# Patient Record
Sex: FEMALE | Race: WHITE | NOT HISPANIC OR LATINO | Employment: OTHER | URBAN - METROPOLITAN AREA
[De-identification: names, ages, dates, MRNs, and addresses within clinical notes are randomized per-mention and may not be internally consistent; named-entity substitution may affect disease eponyms.]

---

## 2017-01-16 ENCOUNTER — OFFICE VISIT (OUTPATIENT)
Dept: FAMILY MEDICINE CLINIC | Facility: CLINIC | Age: 62
End: 2017-01-16

## 2017-01-16 VITALS
OXYGEN SATURATION: 97 % | SYSTOLIC BLOOD PRESSURE: 124 MMHG | BODY MASS INDEX: 31.41 KG/M2 | DIASTOLIC BLOOD PRESSURE: 80 MMHG | HEIGHT: 64 IN | WEIGHT: 184 LBS | HEART RATE: 70 BPM | TEMPERATURE: 97.9 F

## 2017-01-16 DIAGNOSIS — J02.9 SORE THROAT: Primary | ICD-10-CM

## 2017-01-16 LAB
EXPIRATION DATE: NORMAL
INTERNAL CONTROL: NORMAL
Lab: NORMAL
S PYO AG THROAT QL: NEGATIVE

## 2017-01-16 PROCEDURE — 87880 STREP A ASSAY W/OPTIC: CPT | Performed by: FAMILY MEDICINE

## 2017-01-16 PROCEDURE — 99213 OFFICE O/P EST LOW 20 MIN: CPT | Performed by: FAMILY MEDICINE

## 2017-01-16 RX ORDER — CEFDINIR 300 MG/1
300 CAPSULE ORAL 2 TIMES DAILY
Qty: 20 CAPSULE | Refills: 0 | Status: SHIPPED | OUTPATIENT
Start: 2017-01-16 | End: 2017-03-30

## 2017-01-16 NOTE — PROGRESS NOTES
"Subjective   Ana Alaniz is a 62 y.o. female.     Sore Throat    This is a new problem. The current episode started in the past 7 days. The problem has been unchanged. There has been no fever. The pain is mild. Associated symptoms include congestion, coughing, ear pain and swollen glands. Pertinent negatives include no drooling, headaches or shortness of breath. She has had no exposure to strep. She has tried acetaminophen for the symptoms. The treatment provided mild relief.        The following portions of the patient's history were reviewed and updated as appropriate: allergies, current medications, past social history and problem list.    Review of Systems   Constitutional: Negative for chills, fatigue and fever.   HENT: Positive for congestion, ear pain, postnasal drip, sinus pressure and sore throat. Negative for drooling and rhinorrhea.    Eyes: Positive for pain.   Respiratory: Positive for cough. Negative for shortness of breath.    Musculoskeletal: Negative for myalgias.   Skin: Negative.  Negative for rash.   Neurological: Negative for dizziness and headaches.   Hematological: Negative for adenopathy.       Objective   Visit Vitals   • /80   • Pulse 70   • Temp 97.9 °F (36.6 °C)   • Ht 64\" (162.6 cm)   • Wt 184 lb (83.5 kg)   • SpO2 97%   • BMI 31.58 kg/m2     Physical Exam   Constitutional: She is oriented to person, place, and time. She appears well-developed and well-nourished. She is cooperative.   HENT:   Head: Normocephalic.   Right Ear: External ear normal.   Left Ear: External ear normal.   Nose: Nose normal.   Mouth/Throat: Oropharynx is clear and moist.   Throat is red no exudate rapid strep negative   Eyes: Conjunctivae are normal. Pupils are equal, round, and reactive to light. No scleral icterus.   Neck: Neck supple. Carotid bruit is not present. No thyromegaly present.   Cardiovascular: Normal rate and regular rhythm.    Pulmonary/Chest: Effort normal and breath sounds normal. "   Abdominal: There is no hepatosplenomegaly.   Musculoskeletal: Normal range of motion.   Neurological: She is alert and oriented to person, place, and time.   No focal deficits no lateralizing signs   Skin: Skin is warm and dry. No rash noted.   Psychiatric: She has a normal mood and affect. Cognition and memory are normal.   Nursing note and vitals reviewed.      Assessment/Plan   Problem List Items Addressed This Visit     None      Visit Diagnoses     Sore throat    -  Primary    Relevant Orders    POC Rapid Strep A (Completed)          New Medications Ordered This Visit   Medications   • cefdinir (OMNICEF) 300 MG capsule     Sig: Take 1 capsule by mouth 2 (Two) Times a Day.     Dispense:  20 capsule     Refill:  0       Return to clinic if symptoms persist or worsen. Alternate Tylenol and ibuprofen as needed.

## 2017-01-16 NOTE — MR AVS SNAPSHOT
Ana Alaniz   1/16/2017 3:00 PM   Office Visit    Provider:  Donavon Jones MD   Department:  [facility]   Dept Phone:  [phone]                Your Full Care Plan              Today's Medication Changes          These changes are accurate as of: 1/16/17  3:18 PM.  If you have any questions, ask your nurse or doctor.               New Medication(s)Ordered:     cefdinir 300 MG capsule   Commonly known as:  OMNICEF   Take 1 capsule by mouth 2 (Two) Times a Day.   Started by:  Donavon Jones MD            Where to Get Your Medications      These medications were sent to [address] AT [facility] [phone] [facility] [phone] [place] [id number]     Phone:  [phone]     cefdinir 300 MG capsule                  Your Updated Medication List          This list is accurate as of: 1/16/17  3:18 PM.  Always use your most recent med list.                * amLODIPine 5 MG tablet   Commonly known as:  NORVASC       * amLODIPine 5 MG tablet   Commonly known as:  NORVASC       cefdinir 300 MG capsule   Commonly known as:  OMNICEF   Take 1 capsule by mouth 2 (Two) Times a Day.       fexofenadine 180 MG tablet   Commonly known as:  ALLEGRA       Flax Seed Oil 1300 MG capsule       hydrochlorothiazide 25 MG tablet   Commonly known as:  HYDRODIURIL       losartan 50 MG tablet   Commonly known as:  COZAAR       Multi Vitamin tablet       omeprazole 40 MG capsule   Commonly known as:  priLOSEC       * Notice:  This list has 2 medication(s) that are the same as other medications prescribed for you. Read the directions carefully, and ask your doctor or other care provider to review them with you.            We Performed the Following     POC Rapid Strep A       You Were Diagnosed With        Codes Comments    Sore throat    -  Primary ICD-10-CM: J02.9  ICD-9-CM: 462       Instructions     None    "   Patient Instructions History      MDCapsulehart Signup     [facility] [employer] allows you to send messages to your doctor, view your test results, renew your prescriptions, schedule appointments, and more. To sign up, go to [employer] and click on the Sign Up Now link in the New User? box. Enter your [employer] Activation Code exactly as it appears below along with the last four digits of your Social Security Number and your Date of Birth () to complete the sign-up process. If you do not sign up before the expiration date, you must request a new code.    S[employer] Activation Code: [email or web address]  Expires: 2017  3:18 PM    If you have questions, you can email [email or web address] or call [phone] to talk to our [employer] staff. Remember, [employer] of [employer] is NOT to be used for urgent needs. For medical emergencies, dial 911.               Other Info from Your Visit           Allergies     Ace Inhibitors        Reason for Visit     URI     Sore Throat           Vital Signs     Blood Pressure Pulse Temperature Height Weight Oxygen Saturation    124/80 70 97.9 °F (36.6 °C) 64\" (162.6 cm) 184 lb (83.5 kg) 97%    Body Mass Index Smoking Status                31.58 kg/m2 Never Smoker          Problems and Diagnoses Noted     Sore throat    -  Primary      Results     POC Rapid Strep A      Component Value Standard Range & Units    Rapid Strep A Screen Negative Negative, VALID, INVALID, Not Performed    Internal Control Passed Passed    Lot Number [id number]     Expiration Date 3/2018                   "

## 2017-03-30 ENCOUNTER — OFFICE VISIT (OUTPATIENT)
Dept: FAMILY MEDICINE CLINIC | Facility: CLINIC | Age: 62
End: 2017-03-30

## 2017-03-30 VITALS
DIASTOLIC BLOOD PRESSURE: 72 MMHG | HEART RATE: 72 BPM | SYSTOLIC BLOOD PRESSURE: 120 MMHG | WEIGHT: 181 LBS | HEIGHT: 64 IN | BODY MASS INDEX: 30.9 KG/M2 | OXYGEN SATURATION: 95 % | TEMPERATURE: 98.4 F

## 2017-03-30 DIAGNOSIS — I10 ESSENTIAL HYPERTENSION: ICD-10-CM

## 2017-03-30 DIAGNOSIS — K21.9 GASTROESOPHAGEAL REFLUX DISEASE WITHOUT ESOPHAGITIS: ICD-10-CM

## 2017-03-30 DIAGNOSIS — R07.2 PRECORDIAL PAIN: Primary | ICD-10-CM

## 2017-03-30 DIAGNOSIS — M79.601 BILATERAL ARM PAIN: ICD-10-CM

## 2017-03-30 DIAGNOSIS — M79.602 BILATERAL ARM PAIN: ICD-10-CM

## 2017-03-30 LAB
ALBUMIN SERPL-MCNC: 4.9 G/DL (ref 3.2–4.8)
ALBUMIN/GLOB SERPL: 1.7 G/DL (ref 1.5–2.5)
ALP SERPL-CCNC: 76 U/L (ref 25–100)
ALT SERPL W P-5'-P-CCNC: 25 U/L (ref 7–40)
ANION GAP SERPL CALCULATED.3IONS-SCNC: 9 MMOL/L (ref 3–11)
ARTICHOKE IGE QN: 131 MG/DL (ref 0–130)
AST SERPL-CCNC: 27 U/L (ref 0–33)
BASOPHILS # BLD AUTO: 0.05 10*3/MM3 (ref 0–0.2)
BASOPHILS NFR BLD AUTO: 0.7 % (ref 0–1)
BILIRUB SERPL-MCNC: 0.9 MG/DL (ref 0.3–1.2)
BUN BLD-MCNC: 14 MG/DL (ref 9–23)
BUN/CREAT SERPL: 20 (ref 7–25)
CALCIUM SPEC-SCNC: 10.3 MG/DL (ref 8.7–10.4)
CHLORIDE SERPL-SCNC: 100 MMOL/L (ref 99–109)
CHOLEST SERPL-MCNC: 238 MG/DL (ref 0–200)
CK MB SERPL-CCNC: 0.21 NG/ML (ref 0–5)
CK SERPL-CCNC: 64 U/L (ref 26–174)
CO2 SERPL-SCNC: 32 MMOL/L (ref 20–31)
CREAT BLD-MCNC: 0.7 MG/DL (ref 0.6–1.3)
CRP SERPL-MCNC: 2.2 MG/DL (ref 0–10)
DEPRECATED RDW RBC AUTO: 42.6 FL (ref 37–54)
EOSINOPHIL # BLD AUTO: 0.16 10*3/MM3 (ref 0.1–0.3)
EOSINOPHIL NFR BLD AUTO: 2.3 % (ref 0–3)
ERYTHROCYTE [DISTWIDTH] IN BLOOD BY AUTOMATED COUNT: 12.6 % (ref 11.3–14.5)
GFR SERPL CREATININE-BSD FRML MDRD: 84 ML/MIN/1.73
GLOBULIN UR ELPH-MCNC: 2.9 GM/DL
GLUCOSE BLD-MCNC: 104 MG/DL (ref 70–100)
HCT VFR BLD AUTO: 40.9 % (ref 34.5–44)
HDLC SERPL-MCNC: 99 MG/DL (ref 40–60)
HGB BLD-MCNC: 13.6 G/DL (ref 11.5–15.5)
IMM GRANULOCYTES # BLD: 0.02 10*3/MM3 (ref 0–0.03)
IMM GRANULOCYTES NFR BLD: 0.3 % (ref 0–0.6)
LYMPHOCYTES # BLD AUTO: 2.2 10*3/MM3 (ref 0.6–4.8)
LYMPHOCYTES NFR BLD AUTO: 31.4 % (ref 24–44)
MCH RBC QN AUTO: 30.7 PG (ref 27–31)
MCHC RBC AUTO-ENTMCNC: 33.3 G/DL (ref 32–36)
MCV RBC AUTO: 92.3 FL (ref 80–99)
MONOCYTES # BLD AUTO: 0.53 10*3/MM3 (ref 0–1)
MONOCYTES NFR BLD AUTO: 7.6 % (ref 0–12)
NEUTROPHILS # BLD AUTO: 4.04 10*3/MM3 (ref 1.5–8.3)
NEUTROPHILS NFR BLD AUTO: 57.7 % (ref 41–71)
PLATELET # BLD AUTO: 234 10*3/MM3 (ref 150–450)
PMV BLD AUTO: 11.6 FL (ref 6–12)
POTASSIUM BLD-SCNC: 3.4 MMOL/L (ref 3.5–5.5)
PROT SERPL-MCNC: 7.8 G/DL (ref 5.7–8.2)
RBC # BLD AUTO: 4.43 10*6/MM3 (ref 3.89–5.14)
SODIUM BLD-SCNC: 141 MMOL/L (ref 132–146)
TRIGL SERPL-MCNC: 67 MG/DL (ref 0–150)
WBC NRBC COR # BLD: 7 10*3/MM3 (ref 3.5–10.8)

## 2017-03-30 PROCEDURE — 86140 C-REACTIVE PROTEIN: CPT | Performed by: FAMILY MEDICINE

## 2017-03-30 PROCEDURE — 82550 ASSAY OF CK (CPK): CPT | Performed by: FAMILY MEDICINE

## 2017-03-30 PROCEDURE — 85025 COMPLETE CBC W/AUTO DIFF WBC: CPT | Performed by: FAMILY MEDICINE

## 2017-03-30 PROCEDURE — 80061 LIPID PANEL: CPT | Performed by: FAMILY MEDICINE

## 2017-03-30 PROCEDURE — 80053 COMPREHEN METABOLIC PANEL: CPT | Performed by: FAMILY MEDICINE

## 2017-03-30 PROCEDURE — 82553 CREATINE MB FRACTION: CPT | Performed by: FAMILY MEDICINE

## 2017-03-30 PROCEDURE — 99214 OFFICE O/P EST MOD 30 MIN: CPT | Performed by: FAMILY MEDICINE

## 2017-03-30 PROCEDURE — 36415 COLL VENOUS BLD VENIPUNCTURE: CPT | Performed by: FAMILY MEDICINE

## 2017-03-30 PROCEDURE — 93000 ELECTROCARDIOGRAM COMPLETE: CPT | Performed by: FAMILY MEDICINE

## 2017-03-30 RX ORDER — LOSARTAN POTASSIUM 50 MG/1
50 TABLET ORAL DAILY
Qty: 90 TABLET | Refills: 3 | Status: SHIPPED | OUTPATIENT
Start: 2017-03-30 | End: 2018-04-16 | Stop reason: SDUPTHER

## 2017-03-30 RX ORDER — HYDROCHLOROTHIAZIDE 25 MG/1
25 TABLET ORAL DAILY
Qty: 90 TABLET | Refills: 3 | Status: SHIPPED | OUTPATIENT
Start: 2017-03-30 | End: 2018-04-16 | Stop reason: SDUPTHER

## 2017-03-30 RX ORDER — AMLODIPINE BESYLATE 5 MG/1
5 TABLET ORAL DAILY
Qty: 90 TABLET | Refills: 3 | Status: SHIPPED | OUTPATIENT
Start: 2017-03-30 | End: 2018-04-16 | Stop reason: SDUPTHER

## 2017-03-30 RX ORDER — NITROGLYCERIN 0.4 MG/1
0.4 TABLET SUBLINGUAL
Qty: 20 TABLET | Refills: 0 | Status: SHIPPED | OUTPATIENT
Start: 2017-03-30 | End: 2021-03-17

## 2017-03-30 NOTE — PROGRESS NOTES
"Subjective   Ana Alaniz is a 62 y.o. female.     Arm Pain    The incident occurred more than 1 week ago (everyday x 2 weeks). There was no injury mechanism. The pain is present in the upper left arm and upper right arm. The quality of the pain is described as aching (constant). The pain radiates to the right neck and left neck. The pain is moderate. The pain has been constant since the incident. Associated symptoms include chest pain (mild intermittent). Nothing aggravates the symptoms.        The following portions of the patient's history were reviewed and updated as appropriate: allergies, current medications, past social history and problem list.    Review of Systems   Constitutional: Negative for diaphoresis.   Respiratory: Negative for shortness of breath.    Cardiovascular: Positive for chest pain (mild intermittent).   Gastrointestinal: Negative for nausea.       Objective   /72  Pulse 72  Temp 98.4 °F (36.9 °C)  Ht 64\" (162.6 cm)  Wt 181 lb (82.1 kg)  SpO2 95%  BMI 31.07 kg/m2  Physical Exam   Constitutional: She appears well-developed and well-nourished.   Cardiovascular: Normal rate, regular rhythm, normal heart sounds and intact distal pulses.    Pulmonary/Chest: Effort normal and breath sounds normal.   Musculoskeletal: Normal range of motion. She exhibits tenderness (mild left upper arm).   Nursing note and vitals reviewed.      ECG 12 Lead  Date/Time: 3/31/2017 8:54 AM  Performed by: MARTHA SHERMAN  Authorized by: MARTHA SHERMAN   Comparison: compared with previous ECG   Comparison to previous ECG: Tracing from July 2015 revealed upright T waves in leads V3 through V6. The patient now has inverted T waves in those leads. The right bundle branch block was present at that time as well.  Rhythm: sinus rhythm  Rate: normal  Conduction: right bundle branch block  ST Segments: ST segments normal  T depression: V2, V3, V4, V5, V6 and III  T flattening: aVF  QRS axis: normal  Clinical " impression: abnormal ECG          Assessment/Plan   Problem List Items Addressed This Visit        Cardiovascular and Mediastinum    Hypertension       Digestive    Gastroesophageal reflux disease      Other Visit Diagnoses     Precordial pain    -  Primary    Bilateral arm pain                  Rest and drink plenty fluids.    Rx for Nitrostat as needed.    Start Aspirin 325 mg daily.    Check CBC,CMP,Lipids, CK with CKMB and CRP. Report results by letter.    Refer to Cardiology ASAP.    Follow up as needed.    Scribed for Dr Tom Bernal by Neryeda Schwartz CMA.    I, Tom Bernal MD, personally performed the services described in this documentation, as scribed by Nereyda Schwartz in my presence, and is both accurate and complete.

## 2017-03-31 ENCOUNTER — OFFICE VISIT (OUTPATIENT)
Dept: FAMILY MEDICINE CLINIC | Facility: CLINIC | Age: 62
End: 2017-03-31

## 2017-03-31 VITALS
DIASTOLIC BLOOD PRESSURE: 70 MMHG | HEIGHT: 64 IN | OXYGEN SATURATION: 98 % | SYSTOLIC BLOOD PRESSURE: 122 MMHG | WEIGHT: 182 LBS | TEMPERATURE: 98.3 F | HEART RATE: 75 BPM | BODY MASS INDEX: 31.07 KG/M2

## 2017-03-31 DIAGNOSIS — R07.2 PRECORDIAL PAIN: Primary | ICD-10-CM

## 2017-03-31 DIAGNOSIS — M79.601 BILATERAL ARM PAIN: ICD-10-CM

## 2017-03-31 DIAGNOSIS — I10 ESSENTIAL HYPERTENSION: ICD-10-CM

## 2017-03-31 DIAGNOSIS — M79.602 BILATERAL ARM PAIN: ICD-10-CM

## 2017-03-31 PROCEDURE — 99213 OFFICE O/P EST LOW 20 MIN: CPT | Performed by: FAMILY MEDICINE

## 2017-03-31 RX ORDER — ASPIRIN 325 MG
325 TABLET ORAL DAILY
COMMUNITY
End: 2018-04-16

## 2017-03-31 RX ORDER — OLOPATADINE HYDROCHLORIDE 1 MG/ML
1 SOLUTION/ DROPS OPHTHALMIC 2 TIMES DAILY
Qty: 5 ML | Refills: 1 | Status: SHIPPED | OUTPATIENT
Start: 2017-03-31 | End: 2018-04-16

## 2017-03-31 RX ORDER — POTASSIUM CHLORIDE 750 MG/1
10 CAPSULE, EXTENDED RELEASE ORAL 2 TIMES DAILY
Qty: 30 CAPSULE | Refills: 0 | Status: SHIPPED | OUTPATIENT
Start: 2017-03-31 | End: 2017-12-05

## 2017-04-03 ENCOUNTER — HOSPITAL ENCOUNTER (OUTPATIENT)
Dept: GENERAL RADIOLOGY | Facility: HOSPITAL | Age: 62
Discharge: HOME | End: 2017-04-03
Attending: FAMILY MEDICINE | Admitting: FAMILY MEDICINE

## 2017-04-03 DIAGNOSIS — M79.601 BILATERAL ARM PAIN: ICD-10-CM

## 2017-04-03 DIAGNOSIS — M79.602 BILATERAL ARM PAIN: ICD-10-CM

## 2017-04-03 DIAGNOSIS — R07.2 PRECORDIAL PAIN: ICD-10-CM

## 2017-04-03 PROCEDURE — 71020 HC CHEST PA AND LATERAL: CPT

## 2017-04-14 ENCOUNTER — CONSULT (OUTPATIENT)
Dept: CARDIOLOGY | Facility: CLINIC | Age: 62
End: 2017-04-14

## 2017-04-14 VITALS
SYSTOLIC BLOOD PRESSURE: 124 MMHG | BODY MASS INDEX: 30.73 KG/M2 | HEIGHT: 64 IN | HEART RATE: 88 BPM | DIASTOLIC BLOOD PRESSURE: 88 MMHG | WEIGHT: 180 LBS

## 2017-04-14 DIAGNOSIS — I10 ESSENTIAL HYPERTENSION: ICD-10-CM

## 2017-04-14 DIAGNOSIS — R07.2 PRECORDIAL PAIN: Primary | ICD-10-CM

## 2017-04-14 DIAGNOSIS — E78.2 MIXED HYPERLIPIDEMIA: ICD-10-CM

## 2017-04-14 PROCEDURE — 99244 OFF/OP CNSLTJ NEW/EST MOD 40: CPT | Performed by: INTERNAL MEDICINE

## 2017-04-14 NOTE — PROGRESS NOTES
[facility]  Consultation H&P  Ana Alaniz  1955  [phone]  VISIT DATE:  04/14/2017    PCP: Tom Bernal MD  [id number] [place] [address] [place] [id number]    IDENTIFICATION: A 62 y.o. female from [place], her  is a patient of Dr. Killian's as well.    CC:  Chief Complaint   Patient presents with   • Consult     CP       PROBLEM LIST:  1. HTN  2. HLD  1. Lipids 3/30/17:   238/67/99/131  2. ASCVD 10 year risk = 4.8%  3. LUKASZ- on CPAP since 2013  4. GERD  5. OA    Allergies  Allergies   Allergen Reactions   • Ace Inhibitors        Current Medications    Current Outpatient Prescriptions:   •  amLODIPine (NORVASC) 5 MG tablet, Take 1 tablet by mouth Daily., Disp: 90 tablet, Rfl: 3  •  aspirin 325 MG tablet, Take 325 mg by mouth Daily., Disp: , Rfl:   •  fexofenadine (ALLEGRA) 180 MG tablet, Take  by mouth., Disp: , Rfl:   •  Flaxseed, Linseed, (FLAX SEED OIL) 1300 MG capsule, Take  by mouth., Disp: , Rfl:   •  hydrochlorothiazide (HYDRODIURIL) 25 MG tablet, Take 1 tablet by mouth Daily., Disp: 90 tablet, Rfl: 3  •  losartan (COZAAR) 50 MG tablet, Take 1 tablet by mouth Daily., Disp: 90 tablet, Rfl: 3  •  Multiple Vitamin (MULTI VITAMIN) tablet, Take  by mouth., Disp: , Rfl:   •  nitroglycerin (NITROSTAT) 0.4 MG SL tablet, Place 1 tablet under the tongue Every 5 (Five) Minutes As Needed for Chest Pain. Take no more than 3 doses in 15 minutes., Disp: 20 tablet, Rfl: 0  •  olopatadine (PATANOL) 0.1 % ophthalmic solution, Administer 1 drop to both eyes 2 (Two) Times a Day., Disp: 5 mL, Rfl: 1  •  omeprazole (priLOSEC) 40 MG capsule, Take  by mouth., Disp: , Rfl:   •  potassium chloride (MICRO-K) 10 MEQ CR capsule, Take 1 capsule by mouth 2 (Two) Times a Day., Disp: 30 capsule, Rfl: 0     History of Present Illness   HPI  This is a 62 year old  female with a PMH significant for hypertension, hyperlipidemia, obstructive sleep apnea is presenting  for consult by her PCP, Dr. Bernal, for chest pain.  She seen her PCPs office on .  Her pain started in her upper arms bilaterally about a month ago, and the pain would sometimes move to her central chest. She describes the pain as 6-7/10, a tingling/burning pain. She reports associated tingling in her fingers.  She first noticed this pain when walking for exercise, but the pain happens at rest as well.  She denies dyspnea, dyspnea on exertion, orthopnea, PND, palpitations, lower extremity edema. PT denies history of CHF, DVT, PE, CVA, TIA, MI, and rheumatic fever.  Pt remembers having a stress test 10+ years ago for having tachycardia.  Pt reports cutting down on caffeine and has 1-2 cups of coffee in the mornings.  Pt denies family history of early cardiac death, MI, or aneurysm. Mother had multiple MIs, ended up dying in her 60s not directly from MI, and father  young of a blood clot, unsure on more specifics.     ROS  Review of Systems   Cardiovascular: Positive for chest pain.   All other systems reviewed and are negative.      SOCIAL HX  Social History     Social History   • Marital status:      Spouse name: N/A   • Number of children: N/A   • Years of education: N/A     Occupational History   • Not on file.     Social History Main Topics   • Smoking status: Never Smoker   • Smokeless tobacco: Never Used   • Alcohol use 1.2 - 2.4 oz/week     1 - 2 Glasses of wine, 1 - 2 Cans of beer per week      Comment: nightly   • Drug use: No   • Sexual activity: Defer     Other Topics Concern   • Not on file     Social History Narrative       FAMILY HX  Family History   Problem Relation Age of Onset   • Heart disease Mother    • Hypertension Mother    • Kidney disease Mother    • Hypertension Father    • Stroke Father    • Coronary artery disease Father        Vitals:    17 1046 17 1048 17 1049   BP: 118/78 116/82 124/88   BP Location: Right arm Right arm Left arm   Patient Position:  "Sitting Sitting Sitting   Pulse: 89 97 88   Weight: 180 lb (81.6 kg)     Height: 64\" (162.6 cm)         PHYSICAL EXAMINATION:  Physical Exam   Constitutional: She is oriented to person, place, and time. She appears well-developed and well-nourished. No distress.   HENT:   Head: Normocephalic and atraumatic.   Right Ear: External ear normal.   Left Ear: External ear normal.   Nose: Nose normal.   Eyes: Conjunctivae and EOM are normal.   Neck: Neck supple. No hepatojugular reflux and no JVD present. Carotid bruit is not present. No thyromegaly present.   Cardiovascular: Normal rate, regular rhythm, S1 normal, S2 normal, normal heart sounds, intact distal pulses and normal pulses.  Exam reveals no gallop, no distant heart sounds and no midsystolic click.    No murmur heard.  Pulses:       Radial pulses are 2+ on the right side, and 2+ on the left side.        Dorsalis pedis pulses are 2+ on the right side, and 2+ on the left side.        Posterior tibial pulses are 2+ on the right side, and 2+ on the left side.   Pulmonary/Chest: Effort normal and breath sounds normal. No respiratory distress. She has no decreased breath sounds. She has no wheezes. She has no rhonchi. She has no rales.   Abdominal: Soft. Bowel sounds are normal. There is no hepatosplenomegaly. There is no tenderness.   Musculoskeletal: Normal range of motion. She exhibits no edema.   Neurological: She is alert and oriented to person, place, and time.   No focal deficits.   Skin: Skin is warm and dry. No erythema.   Psychiatric: She has a normal mood and affect. Thought content normal.   Nursing note and vitals reviewed.      Diagnostic Data:  Procedures  Lab Results   Component Value Date    CHLPL 242 (H) 03/17/2016    TRIG 67 03/30/2017    HDL 99 (H) 03/30/2017    LDLDIRECT 131 (H) 03/30/2017     Lab Results   Component Value Date    GLUCOSE 104 (H) 03/30/2017    BUN 14 03/30/2017    CREATININE 0.70 03/30/2017     03/30/2017    K 3.4 (L) " 03/30/2017     03/30/2017    CO2 32.0 (H) 03/30/2017     Lab Results   Component Value Date    HGBA1C 5.5 07/16/2015     Lab Results   Component Value Date    WBC 7.00 03/30/2017    HGB 13.6 03/30/2017    HCT 40.9 03/30/2017     03/30/2017       ASSESSMENT:   Diagnosis Plan   1. Precordial pain  Stress Test With Myocardial Perfusion (1 Day)   2. Essential hypertension     3. Mixed hyperlipidemia            PLAN:  1.  Ischemic evaluation via exercise cardiolite due to anginal equivalent CP with strong family history  2.  Well controlled on amlodipine and HCTZ  3.  Well controlled, HDL 99, on statin therapy    Scribed for Naga Killian MD by NABIL Clement. 4/14/2017  11:23 AM  I, Naga Killian MD, personally performed the services described in this documentation as scribed by the above named individual in my presence, and it is both accurate and complete.  4/14/2017  5:36 PM      Naga Killian MD, FACC

## 2017-04-19 ENCOUNTER — APPOINTMENT (OUTPATIENT)
Dept: CARDIOLOGY | Facility: HOSPITAL | Age: 62
End: 2017-04-19

## 2017-04-19 ENCOUNTER — HOSPITAL ENCOUNTER (OUTPATIENT)
Dept: CARDIOLOGY | Facility: HOSPITAL | Age: 62
Discharge: HOME | End: 2017-04-19

## 2017-04-19 ENCOUNTER — HOSPITAL ENCOUNTER (OUTPATIENT)
Dept: CARDIOLOGY | Facility: HOSPITAL | Age: 62
Discharge: HOME | End: 2017-04-19
Admitting: PHYSICIAN ASSISTANT

## 2017-04-19 VITALS
WEIGHT: 179 LBS | HEART RATE: 66 BPM | BODY MASS INDEX: 30.56 KG/M2 | HEIGHT: 64 IN | SYSTOLIC BLOOD PRESSURE: 140 MMHG | DIASTOLIC BLOOD PRESSURE: 82 MMHG

## 2017-04-19 DIAGNOSIS — R07.2 PRECORDIAL PAIN: ICD-10-CM

## 2017-04-19 PROCEDURE — 0 TECHNETIUM SESTAMIBI: Performed by: PHYSICIAN ASSISTANT

## 2017-04-19 PROCEDURE — 78452 HT MUSCLE IMAGE SPECT MULT: CPT

## 2017-04-19 PROCEDURE — 93017 CV STRESS TEST TRACING ONLY: CPT

## 2017-04-19 PROCEDURE — 93018 CV STRESS TEST I&R ONLY: CPT | Performed by: INTERNAL MEDICINE

## 2017-04-19 PROCEDURE — 78452 HT MUSCLE IMAGE SPECT MULT: CPT | Performed by: INTERNAL MEDICINE

## 2017-04-19 PROCEDURE — A9500 TC99M SESTAMIBI: HCPCS | Performed by: PHYSICIAN ASSISTANT

## 2017-04-19 RX ADMIN — Medication 1 DOSE: at 09:20

## 2017-04-19 RX ADMIN — Medication 1 DOSE: at 07:35

## 2017-05-18 LAB
BH CV STRESS BP STAGE 1: NORMAL
BH CV STRESS BP STAGE 2: NORMAL
BH CV STRESS DURATION MIN STAGE 1: 3
BH CV STRESS DURATION MIN STAGE 2: 3
BH CV STRESS DURATION SEC STAGE 1: 0
BH CV STRESS DURATION SEC STAGE 2: 30
BH CV STRESS GRADE STAGE 1: 10
BH CV STRESS GRADE STAGE 2: 12
BH CV STRESS HR STAGE 1: 117
BH CV STRESS HR STAGE 2: 129
BH CV STRESS METS STAGE 1: 5
BH CV STRESS METS STAGE 2: 7.5
BH CV STRESS O2 STAGE 1: 98
BH CV STRESS O2 STAGE 2: 100
BH CV STRESS PROTOCOL 1: NORMAL
BH CV STRESS RECOVERY BP: NORMAL MMHG
BH CV STRESS RECOVERY HR: 88 BPM
BH CV STRESS SPEED STAGE 1: 1.7
BH CV STRESS SPEED STAGE 2: 2.5
BH CV STRESS STAGE 1: 1
BH CV STRESS STAGE 2: 2
LV EF NUC BP: 89 %
MAXIMAL PREDICTED HEART RATE: 156 BPM
PERCENT MAX PREDICTED HR: 85.26 %
STRESS BASELINE BP: NORMAL MMHG
STRESS BASELINE HR: 70 BPM
STRESS PERCENT HR: 100 %
STRESS POST ESTIMATED WORKLOAD: 7 METS
STRESS POST EXERCISE DUR MIN: 6 MIN
STRESS POST EXERCISE DUR SEC: 30 SEC
STRESS POST O2 SAT PEAK: 100 %
STRESS POST PEAK BP: NORMAL MMHG
STRESS POST PEAK HR: 133 BPM
STRESS TARGET HR: 133 BPM

## 2017-07-03 ENCOUNTER — OFFICE VISIT (OUTPATIENT)
Dept: FAMILY MEDICINE CLINIC | Facility: CLINIC | Age: 62
End: 2017-07-03

## 2017-07-03 VITALS
HEIGHT: 64 IN | WEIGHT: 181 LBS | OXYGEN SATURATION: 95 % | HEART RATE: 87 BPM | TEMPERATURE: 99.2 F | BODY MASS INDEX: 30.9 KG/M2 | SYSTOLIC BLOOD PRESSURE: 130 MMHG | DIASTOLIC BLOOD PRESSURE: 80 MMHG

## 2017-07-03 DIAGNOSIS — M67.472 GANGLION CYST OF LEFT FOOT: Primary | ICD-10-CM

## 2017-07-03 PROCEDURE — 99213 OFFICE O/P EST LOW 20 MIN: CPT | Performed by: FAMILY MEDICINE

## 2017-07-03 NOTE — PROGRESS NOTES
"Subjective   Ana Alaniz is a 62 y.o. female.     History of Present Illness   The patient presents today with c/o knot of the left foot.  States it has gotten bigger over the past month.  Discomfort with walking.    Denies any chest pain or shortness of breath.    States she is still having a little pain in both upper arms.    The following portions of the patient's history were reviewed and updated as appropriate: allergies, current medications, past social history and problem list.    Review of Systems   Respiratory: Negative for shortness of breath.    Cardiovascular: Negative for chest pain.   Musculoskeletal:        Left foot ganglion         Objective   /80  Pulse 87  Temp 99.2 °F (37.3 °C)  Ht 64\" (162.6 cm)  Wt 181 lb (82.1 kg)  SpO2 95%  BMI 31.07 kg/m2  Physical Exam   Musculoskeletal:   Exam of sole of left foot reveals a cystic subcutaneous tumor of the plantar flexor tendon.  Tender to deep palpation.  No paresthesias with percussion.        Nursing note and vitals reviewed.      Assessment/Plan   Problem List Items Addressed This Visit     None      Visit Diagnoses     Ganglion cyst of left foot    -  Primary      refer to dr franki duran of orthopedics.            I, Tom Bernal MD, personally performed the services described in this documentation, as scribed by Nereyda Schwartz in my presence, and is both accurate and complete.         "

## 2017-07-06 ENCOUNTER — TELEPHONE (OUTPATIENT)
Dept: FAMILY MEDICINE CLINIC | Facility: CLINIC | Age: 62
End: 2017-07-06

## 2017-07-06 RX ORDER — AMLODIPINE BESYLATE AND ATORVASTATIN CALCIUM 10; 10 MG/1; MG/1
1 TABLET, FILM COATED ORAL DAILY
Qty: 90 TABLET | Refills: 3 | Status: SHIPPED | OUTPATIENT
Start: 2017-07-06 | End: 2017-07-07 | Stop reason: SDUPTHER

## 2017-07-06 NOTE — TELEPHONE ENCOUNTER
----- Message from Dia Zuniga MA sent at 7/6/2017  2:59 PM EDT -----  Contact: [phone]  PATIENT IS REQUESTING REFILL ON ATORVASTATIN 10 MG SENT TO [facility]. MEDICATION IS NOT IN PATIENT CURRENT MED LIST. PLEASE ADVISE.

## 2017-07-07 ENCOUNTER — TELEPHONE (OUTPATIENT)
Dept: FAMILY MEDICINE CLINIC | Facility: CLINIC | Age: 62
End: 2017-07-07

## 2017-07-07 RX ORDER — AMLODIPINE BESYLATE AND ATORVASTATIN CALCIUM 10; 10 MG/1; MG/1
1 TABLET, FILM COATED ORAL DAILY
Qty: 90 TABLET | Refills: 3 | Status: SHIPPED | OUTPATIENT
Start: 2017-07-07 | End: 2017-07-07

## 2017-07-07 RX ORDER — ATORVASTATIN CALCIUM 10 MG/1
10 TABLET, FILM COATED ORAL DAILY
Qty: 90 TABLET | Refills: 3 | Status: SHIPPED | OUTPATIENT
Start: 2017-07-07 | End: 2018-04-16 | Stop reason: SDUPTHER

## 2017-07-07 NOTE — TELEPHONE ENCOUNTER
----- Message from Fatoumata Frances MA sent at 7/6/2017  4:58 PM EDT -----  Regarding: medication  atorvastin 10 mg needs to be sent to [facility]

## 2017-09-06 ENCOUNTER — OFFICE VISIT (OUTPATIENT)
Dept: ORTHOPEDIC SURGERY | Facility: CLINIC | Age: 62
End: 2017-09-06

## 2017-09-06 VITALS
HEART RATE: 85 BPM | SYSTOLIC BLOOD PRESSURE: 128 MMHG | RESPIRATION RATE: 16 BRPM | WEIGHT: 178 LBS | DIASTOLIC BLOOD PRESSURE: 83 MMHG | BODY MASS INDEX: 30.39 KG/M2 | HEIGHT: 64 IN

## 2017-09-06 DIAGNOSIS — M72.2 PLANTAR FASCIAL FIBROMATOSIS OF LEFT FOOT: Primary | ICD-10-CM

## 2017-09-06 PROCEDURE — 99202 OFFICE O/P NEW SF 15 MIN: CPT | Performed by: ORTHOPAEDIC SURGERY

## 2017-09-06 NOTE — PROGRESS NOTES
NEW PATIENT    Patient: Ana Alaniz  : 1955    Primary Care Provider: Tom Bernal MD    Requesting Provider: As above    Ganglion Cyst of the Left Foot      History    Chief Complaint: Nodule left foot    History of Present Illness: This is an extremely pleasant 62-year-old woman here today with her .  She has noted a nodule in the middle of the left foot on the plantar surface.  She has been wondering if it was a ganglion cyst.  She just retired this week.  She reports the nodule doesn't really hurt.  She has just been concerned that might be getting bigger.  She reports that her ancestors are from [place].  She does not know anyone who has Dupuytren's, nor anyone who has a similar nodule in the foot.    Current Outpatient Prescriptions on File Prior to Visit   Medication Sig Dispense Refill   • amLODIPine (NORVASC) 5 MG tablet Take 1 tablet by mouth Daily. 90 tablet 3   • aspirin 325 MG tablet Take 325 mg by mouth Daily.     • atorvastatin (LIPITOR) 10 MG tablet Take 1 tablet by mouth Daily. 90 tablet 3   • cephalexin (KEFLEX) 500 MG capsule Take 2,000 mg by mouth Take As Directed. One hour prior to dental work     • fexofenadine (ALLEGRA) 180 MG tablet Take  by mouth.     • Flaxseed, Linseed, (FLAX SEED OIL) 1300 MG capsule Take  by mouth.     • hydrochlorothiazide (HYDRODIURIL) 25 MG tablet Take 1 tablet by mouth Daily. 90 tablet 3   • losartan (COZAAR) 50 MG tablet Take 1 tablet by mouth Daily. 90 tablet 3   • Multiple Vitamin (MULTI VITAMIN) tablet Take  by mouth.     • nitroglycerin (NITROSTAT) 0.4 MG SL tablet Place 1 tablet under the tongue Every 5 (Five) Minutes As Needed for Chest Pain. Take no more than 3 doses in 15 minutes. 20 tablet 0   • olopatadine (PATANOL) 0.1 % ophthalmic solution Administer 1 drop to both eyes 2 (Two) Times a Day. 5 mL 1   • omeprazole (priLOSEC) 40 MG capsule Take  by mouth.     • potassium chloride (MICRO-K) 10 MEQ CR capsule Take 1 capsule by mouth 2  (Two) Times a Day. 30 capsule 0   • RED YEAST RICE EXTRACT PO Take  by mouth Daily. Take as directed       No current facility-administered medications on file prior to visit.       Allergies   Allergen Reactions   • Ace Inhibitors    • Tetanus Toxoids       Past Medical History:   Diagnosis Date   • Acute pharyngitis    • Allergy    • Arrhythmia    • Degenerative arthritis    • Degenerative joint disease 11/8/2016   • Epistaxis    • Glaucoma    • Hypertension    • Positive KENY (antinuclear antibody)    • Right foot pain    • Sinusitis    • Sleep apnea    • Transfusion history    • URI (upper respiratory infection)      Past Surgical History:   Procedure Laterality Date   • APPENDECTOMY     • CATARACT EXTRACTION, BILATERAL     • OTHER SURGICAL HISTORY      LT Knee scar excision   • REPLACEMENT TOTAL KNEE Right    • TOTAL KNEE ARTHROPLASTY Left      Family History   Problem Relation Age of Onset   • Heart disease Mother    • Hypertension Mother    • Kidney disease Mother    • Clotting disorder Mother    • Heart attack Mother    • Hypertension Father    • Stroke Father    • Coronary artery disease Father    • Clotting disorder Father    • Cancer Other       Social History     Social History   • Marital status:      Spouse name: N/A   • Number of children: N/A   • Years of education: N/A     Occupational History   • Not on file.     Social History Main Topics   • Smoking status: Never Smoker   • Smokeless tobacco: Never Used   • Alcohol use 1.2 - 2.4 oz/week     1 - 2 Glasses of wine, 1 - 2 Cans of beer per week      Comment: nightly   • Drug use: No   • Sexual activity: Defer     Other Topics Concern   • Not on file     Social History Narrative        Review of Systems   Constitutional: Negative.    HENT: Negative.    Eyes: Negative.    Respiratory: Negative.    Cardiovascular: Negative.    Gastrointestinal: Negative.    Endocrine: Negative.    Genitourinary: Negative.    Musculoskeletal: Positive for  "arthralgias.        When walking a lot.   Skin: Negative.    Allergic/Immunologic: Negative.    Neurological: Negative.    Hematological: Negative.    Psychiatric/Behavioral: Negative.        The following portions of the patient's history were reviewed and updated as appropriate: allergies, current medications, past family history, past medical history, past social history, past surgical history and problem list.    Physical Exam:   /83  Pulse 85  Resp 16  Ht 64\" (162.6 cm)  Wt 178 lb (80.7 kg)  BMI 30.55 kg/m2  GENERAL: Body habitus: normal weight for height    Lower extremity edema: Left: none; Right: none    Varicose veins:  Left: none; Right: none    Gait: normal     Mental Status:  awake and alert; oriented to person, place, and time    Voice:  clear  SKIN:  Normal and warm and dry    Hair Growth:  Right:normal; Left:  normal  NAILS: Toenails: normal  HEENT: Head: Normocephalic, atraumatic,  without obvious abnormality.  PULM:  Repiratory effort normal  CV:  Dorsalis Pedis:  Right: 2+; Left:2+    Posterior Tibial: Right:2+; Left:2+    Capillary Refill:  Brisk  MSK:  Hand:right handed and No Dupuytren's in either hand, mild Heberden's nodes      Tibia:  Right:  non tender; Left:  non tender      Ankle:  Right: non tender, ROM  normal and motor function  normal; Left:  non tender, ROM  normal and motor function  normal      Foot:  Right:  non tender and No plantar fibromas; Left:  Plantar fibroma in the middle of the medial corner the plantar fascia, nontender, about 8 mm, no other plantar fibromas      NEURO:        Lower extremity sensation: intact           Calf Atrophy:none    Motor Function: all 5/5         Medical Decision Making    Data Review:   none    Assessment and Plan/ Diagnosis/Treatment options:   She has a plantar fibroma.  I explained plantar fibromatosis to the patient in detail.  It is a benign fibrous tumor in the plantar fascia.  It is best treated non-operatively as the " "recurrence rate after surgery is >50%.  It is more common in people of northern  descent and in diabetes.  It can be associated with  Dupuytren's in the palm, and Peyronie's in the male genitals.  It does not cause contracture in the foot.  It can be painful, especially in the developing phase.  However, the pain almost always resolves over time and it becomes a painless bump.      Custom orthotics with a \"dent\" in the area of the nodule help take the pressure off it.       If patients have severe pain, a steroid injection sometimes helps the pain and sometimes shrinks the nodule.    Given that she has very little pain, I would recommend custom orthotics.  I will be happy to see her any time.                  "

## 2017-10-23 ENCOUNTER — OFFICE VISIT (OUTPATIENT)
Dept: FAMILY MEDICINE CLINIC | Facility: CLINIC | Age: 62
End: 2017-10-23

## 2017-10-23 VITALS
HEART RATE: 82 BPM | HEIGHT: 64 IN | SYSTOLIC BLOOD PRESSURE: 118 MMHG | TEMPERATURE: 99.6 F | DIASTOLIC BLOOD PRESSURE: 66 MMHG | WEIGHT: 176 LBS | BODY MASS INDEX: 30.05 KG/M2 | OXYGEN SATURATION: 94 %

## 2017-10-23 DIAGNOSIS — J02.0 STREP PHARYNGITIS: Primary | ICD-10-CM

## 2017-10-23 LAB
EXPIRATION DATE: ABNORMAL
INTERNAL CONTROL: ABNORMAL
Lab: ABNORMAL
S PYO AG THROAT QL: POSITIVE

## 2017-10-23 PROCEDURE — 99213 OFFICE O/P EST LOW 20 MIN: CPT | Performed by: FAMILY MEDICINE

## 2017-10-23 PROCEDURE — 87880 STREP A ASSAY W/OPTIC: CPT | Performed by: FAMILY MEDICINE

## 2017-10-23 RX ORDER — AMOXICILLIN 500 MG/1
500 CAPSULE ORAL 3 TIMES DAILY
Qty: 30 CAPSULE | Refills: 0 | Status: SHIPPED | OUTPATIENT
Start: 2017-10-23 | End: 2017-11-19

## 2017-10-23 NOTE — PROGRESS NOTES
"Subjective   Ana Alaniz is a 62 y.o. female.     Sore Throat    This is a new problem. The current episode started in the past 7 days. The problem has been unchanged. The maximum temperature recorded prior to her arrival was 101 - 101.9 F. The pain is moderate. Associated symptoms include a hoarse voice, swollen glands and trouble swallowing. Pertinent negatives include no shortness of breath. She has tried nothing for the symptoms.        The following portions of the patient's history were reviewed and updated as appropriate: allergies, current medications, past social history and problem list.    Review of Systems   Constitutional: Positive for chills, fatigue and fever (low grade).   HENT: Positive for hoarse voice, sore throat and trouble swallowing.    Respiratory: Negative for shortness of breath.    Cardiovascular: Negative for chest pain.       Objective   /66  Pulse 82  Temp 99.6 °F (37.6 °C)  Ht 64\" (162.6 cm)  Wt 176 lb (79.8 kg)  SpO2 94%  BMI 30.21 kg/m2  Physical Exam   Constitutional: She appears well-developed and well-nourished.   HENT:   Right Ear: External ear normal.   Left Ear: External ear normal.   Mouth/Throat: Posterior oropharyngeal edema and posterior oropharyngeal erythema present.   Cardiovascular: Normal rate, regular rhythm and normal heart sounds.    Pulmonary/Chest: Effort normal and breath sounds normal.   Nursing note and vitals reviewed.      Assessment/Plan   Problem List Items Addressed This Visit     None      Visit Diagnoses     Strep pharyngitis    -  Primary    Relevant Orders    POCT rapid strep A (Completed)              Drink plenty fluids.    Rx for Amoxicillin 500 mg three times a day #30+0.    Follow up as needed.            Scribed for Dr Tom Bernal by Nereyda Schwartz CMA.          I, Tom Bernal MD, personally performed the services described in this documentation, as scribed by Nereyda Schwartz in my presence, and is both accurate and complete.    "

## 2017-11-19 ENCOUNTER — OFFICE VISIT (OUTPATIENT)
Dept: FAMILY MEDICINE CLINIC | Facility: CLINIC | Age: 62
End: 2017-11-19

## 2017-11-19 VITALS
BODY MASS INDEX: 30.73 KG/M2 | WEIGHT: 180 LBS | SYSTOLIC BLOOD PRESSURE: 119 MMHG | HEIGHT: 64 IN | DIASTOLIC BLOOD PRESSURE: 78 MMHG | RESPIRATION RATE: 16 BRPM | TEMPERATURE: 98.4 F | OXYGEN SATURATION: 98 % | HEART RATE: 83 BPM

## 2017-11-19 DIAGNOSIS — J01.40 ACUTE NON-RECURRENT PANSINUSITIS: Primary | ICD-10-CM

## 2017-11-19 PROCEDURE — 99213 OFFICE O/P EST LOW 20 MIN: CPT | Performed by: FAMILY MEDICINE

## 2017-11-19 RX ORDER — DOXYCYCLINE 100 MG/1
100 CAPSULE ORAL 2 TIMES DAILY
Qty: 20 CAPSULE | Refills: 0 | Status: SHIPPED | OUTPATIENT
Start: 2017-11-19 | End: 2017-11-29

## 2017-11-19 NOTE — PROGRESS NOTES
Subjective   Ana Alaniz is a 62 y.o. female.     Sinusitis   This is a new problem. The current episode started in the past 7 days. The problem is unchanged. There has been no fever. Her pain is at a severity of 7/10. Associated symptoms include congestion, coughing (productive), ear pain (right), headaches, a hoarse voice, sinus pressure and a sore throat. Pertinent negatives include no chills. Treatments tried: Mucinex. The treatment provided mild relief.        The following portions of the patient's history were reviewed and updated as appropriate: allergies, current medications, past family history, past medical history, past social history, past surgical history and problem list.    Review of Systems   Constitutional: Negative for chills and fever.   HENT: Positive for congestion, ear pain (right), hoarse voice, postnasal drip, rhinorrhea, sinus pain, sinus pressure and sore throat.    Respiratory: Positive for cough (productive).    Neurological: Positive for headaches.       Objective   Physical Exam   Constitutional: She is oriented to person, place, and time. She appears well-developed and well-nourished.   HENT:   Head: Normocephalic and atraumatic.   Right Ear: Hearing, external ear and ear canal normal. A middle ear effusion (clear fluid) is present.   Left Ear: Hearing, tympanic membrane, external ear and ear canal normal.   Nose: Mucosal edema and rhinorrhea present. Right sinus exhibits maxillary sinus tenderness and frontal sinus tenderness. Left sinus exhibits maxillary sinus tenderness and frontal sinus tenderness.   Mouth/Throat: Uvula is midline and mucous membranes are normal. Oropharyngeal exudate present. No posterior oropharyngeal edema or posterior oropharyngeal erythema.   Eyes: Conjunctivae are normal.   Neck: Normal range of motion. Neck supple.   Cardiovascular: Normal rate, regular rhythm and normal heart sounds.    Pulmonary/Chest: Effort normal and breath sounds normal. She has no  wheezes.   Musculoskeletal: She exhibits no deformity.   Lymphadenopathy:     She has no cervical adenopathy.   Neurological: She is alert and oriented to person, place, and time. No cranial nerve deficit.   Skin: Skin is warm and dry.   Psychiatric: She has a normal mood and affect. Her behavior is normal.   Nursing note and vitals reviewed.      Assessment/Plan   Ana was seen today for sinusitis, cough and earache.    Diagnoses and all orders for this visit:    Acute non-recurrent pansinusitis  -     doxycycline (MONODOX) 100 MG capsule; Take 1 capsule by mouth 2 (Two) Times a Day for 10 days.  -     PredniSONE 5 MG tablet therapy pack dosepak; Take as directed on package instructions.      Treatment plan above  Follow up if no improvement.   Ok to continue OTC Mucinex along with prescribed treatment.

## 2017-11-19 NOTE — PATIENT INSTRUCTIONS
Sinusitis, Adult  Sinusitis is soreness and inflammation of your sinuses. Sinuses are hollow spaces in the bones around your face. They are located:  · Around your eyes.  · In the middle of your forehead.  · Behind your nose.  · In your cheekbones.  Your sinuses and nasal passages are lined with a stringy fluid (mucus). Mucus normally drains out of your sinuses. When your nasal tissues get inflamed or swollen, the mucus can get trapped or blocked so air cannot flow through your sinuses. This lets bacteria, viruses, and funguses grow, and that leads to infection.  HOME CARE  Medicines  · Take, use, or apply over-the-counter and prescription medicines only as told by your doctor. These may include nasal sprays.  · If you were prescribed an antibiotic medicine, take it as told by your doctor. Do not stop taking the antibiotic even if you start to feel better.  Hydrate and Humidify  · Drink enough water to keep your pee (urine) clear or pale yellow.  · Use a cool mist humidifier to keep the humidity level in your home above 50%.  · Breathe in steam for 10-15 minutes, 3-4 times a day or as told by your doctor. You can do this in the bathroom while a hot shower is running.  · Try not to spend time in cool or dry air.  Rest  · Rest as much as possible.    · Sleep with your head raised (elevated).  · Make sure to get enough sleep each night.  General Instructions  · Put a warm, moist washcloth on your face 3-4 times a day or as told by your doctor. This will help with discomfort.  · Wash your hands often with soap and water. If there is no soap and water, use hand .  · Do not smoke. Avoid being around people who are smoking (secondhand smoke).  · Keep all follow-up visits as told by your doctor. This is important.  GET HELP IF:  · You have a fever.  · Your symptoms get worse.  · Your symptoms do not get better within 10 days.  GET HELP RIGHT AWAY IF:  · You have a very bad headache.  · You cannot stop throwing up  (vomiting).  · You have pain or swelling around your face or eyes.  · You have trouble seeing.  · You feel confused.  · Your neck is stiff.  · You have trouble breathing.     This information is not intended to replace advice given to you by your health care provider. Make sure you discuss any questions you have with your health care provider.     Document Released: 06/05/2009 Document Revised: 04/10/2017 Document Reviewed: 10/12/2016  [employer] Interactive Patient Education ©2017 [employer]

## 2017-12-05 ENCOUNTER — OFFICE VISIT (OUTPATIENT)
Dept: SLEEP MEDICINE | Facility: HOSPITAL | Age: 62
End: 2017-12-05

## 2017-12-05 VITALS
DIASTOLIC BLOOD PRESSURE: 70 MMHG | WEIGHT: 178 LBS | BODY MASS INDEX: 30.39 KG/M2 | HEART RATE: 80 BPM | SYSTOLIC BLOOD PRESSURE: 118 MMHG | HEIGHT: 64 IN | OXYGEN SATURATION: 98 %

## 2017-12-05 DIAGNOSIS — G47.33 SEVERE OBSTRUCTIVE SLEEP APNEA: Primary | ICD-10-CM

## 2017-12-05 PROCEDURE — 99213 OFFICE O/P EST LOW 20 MIN: CPT | Performed by: INTERNAL MEDICINE

## 2017-12-05 NOTE — PROGRESS NOTES
Subjective:     Chief Complaint:   Chief Complaint   Patient presents with   • Follow-up       HPI:    Ana Alaniz is a 62 y.o. female here for follow-up of obstructive sleep apnea.    She remains on auto CPAP therapy for her obstructive sleep apnea.  Her main complaint is a dry mouth.  She is utilizing a facemask.  She has not turned up her humidifier as of yet.    Further details are as follows:    Since last visit sleep problem has: remained the same  Currently using PAP: yes Hours of usage during the night: 6    Amount of sleep per night : 7 hours  Average length of time it takes to fall asleep : 10 minutes  Number of awakenings per night : 3     She feels fatigue (tiredness, exhaustion, lethargy) in the daytime even when not sleepy? Occasionally   She feels sleepy (or struggles to stay awake) in the daytime? No    [place] Scale scored as 1/24.    Type of mask: full face mask    She (since starting PAP or since last visit) has problems with the following:   Pressure from the mask 0 - No Problem  Skin irritation from the mask 0 - No Problem  Mask coming off face 0 - No Problem  Air leaks from the mask 4 - Moderate Problems  Dry mouth or throat 8 - Severe Problems  Nasal congestion 5 - Moderate Problems    I reviewed her PAP download:  Average pressure: 8  Average AHI:  3  Average minutes in large leak per night: 2      Current medications are:   Current Outpatient Prescriptions:   •  amLODIPine (NORVASC) 5 MG tablet, Take 1 tablet by mouth Daily., Disp: 90 tablet, Rfl: 3  •  atorvastatin (LIPITOR) 10 MG tablet, Take 1 tablet by mouth Daily., Disp: 90 tablet, Rfl: 3  •  fexofenadine (ALLEGRA) 180 MG tablet, Take  by mouth., Disp: , Rfl:   •  Flaxseed, Linseed, (FLAX SEED OIL) 1300 MG capsule, Take  by mouth., Disp: , Rfl:   •  hydrochlorothiazide (HYDRODIURIL) 25 MG tablet, Take 1 tablet by mouth Daily., Disp: 90 tablet, Rfl: 3  •  losartan (COZAAR) 50 MG tablet, Take 1 tablet by mouth Daily., Disp: 90  tablet, Rfl: 3  •  Multiple Vitamin (MULTI VITAMIN) tablet, Take  by mouth., Disp: , Rfl:   •  nitroglycerin (NITROSTAT) 0.4 MG SL tablet, Place 1 tablet under the tongue Every 5 (Five) Minutes As Needed for Chest Pain. Take no more than 3 doses in 15 minutes., Disp: 20 tablet, Rfl: 0  •  olopatadine (PATANOL) 0.1 % ophthalmic solution, Administer 1 drop to both eyes 2 (Two) Times a Day., Disp: 5 mL, Rfl: 1  •  omeprazole (priLOSEC) 40 MG capsule, Take  by mouth., Disp: , Rfl:   •  RED YEAST RICE EXTRACT PO, Take  by mouth Daily. Take as directed, Disp: , Rfl:   •  aspirin 325 MG tablet, Take 325 mg by mouth Daily., Disp: , Rfl: .      The patient's relevant past medical, surgical, family and social history were reviewed and updated in Epic as appropriate.     ROS:    Review of Systems   Constitutional: Negative for fatigue.   HENT: Positive for congestion.    Respiratory: Positive for apnea.          Objective:    Physical Exam   Constitutional: She is oriented to person, place, and time. She appears well-developed and well-nourished.   HENT:   Head: Normocephalic and atraumatic.   Mouth/Throat: Oropharynx is clear and moist.   Class I airway   Neck: Neck supple. No thyromegaly present.   Cardiovascular: Normal rate and regular rhythm.  Exam reveals no gallop and no friction rub.    No murmur heard.  Pulmonary/Chest: Effort normal. No respiratory distress. She has no wheezes. She has no rales.   Musculoskeletal: She exhibits no edema.   Neurological: She is alert and oriented to person, place, and time.   Skin: Skin is warm and dry.   Psychiatric: She has a normal mood and affect. Her behavior is normal.   Vitals reviewed.      Data:    Patient's PAP download was personally reviewed including raw data and results.    Assessment:    Problem List Items Addressed This Visit        Pulmonary Problems    Severe obstructive sleep apnea - Primary    Overview     Auto CPAP 8-18         Relevant Orders    PAP Therapy           Acceptable treatment of her obstructive sleep apnea with auto CPAP fullface mask.  The only exception is excessive mucosal dryness.  I think she needs to adjust her humidifier up and I discussed this with her.  Her mask fit seems acceptable.    Plan:     1. Increase humidifier settings  2. No change in mask needed  3. No adjustments to CPAP settings  4. I renewed supplies for the next year  5. Routine follow-up      Discussed in detail with the patient.  She will call prior to her follow up visit for any new problems.    Signed by  Modesto Liang MD

## 2018-04-16 ENCOUNTER — OFFICE VISIT (OUTPATIENT)
Dept: FAMILY MEDICINE CLINIC | Facility: CLINIC | Age: 63
End: 2018-04-16

## 2018-04-16 VITALS
HEIGHT: 64 IN | RESPIRATION RATE: 20 BRPM | DIASTOLIC BLOOD PRESSURE: 80 MMHG | WEIGHT: 179 LBS | TEMPERATURE: 98.2 F | BODY MASS INDEX: 30.56 KG/M2 | SYSTOLIC BLOOD PRESSURE: 120 MMHG | HEART RATE: 107 BPM | OXYGEN SATURATION: 96 %

## 2018-04-16 DIAGNOSIS — M79.601 BILATERAL ARM PAIN: ICD-10-CM

## 2018-04-16 DIAGNOSIS — Z11.59 NEED FOR HEPATITIS C SCREENING TEST: ICD-10-CM

## 2018-04-16 DIAGNOSIS — R06.02 SHORTNESS OF BREATH ON EXERTION: ICD-10-CM

## 2018-04-16 DIAGNOSIS — E78.2 MIXED HYPERLIPIDEMIA: ICD-10-CM

## 2018-04-16 DIAGNOSIS — K21.9 GASTROESOPHAGEAL REFLUX DISEASE WITHOUT ESOPHAGITIS: ICD-10-CM

## 2018-04-16 DIAGNOSIS — M79.602 BILATERAL ARM PAIN: ICD-10-CM

## 2018-04-16 DIAGNOSIS — I10 ESSENTIAL HYPERTENSION: Primary | ICD-10-CM

## 2018-04-16 DIAGNOSIS — Z23 IMMUNIZATION DUE: ICD-10-CM

## 2018-04-16 LAB
ALBUMIN SERPL-MCNC: 5.2 G/DL (ref 3.2–4.8)
ALBUMIN/GLOB SERPL: 1.8 G/DL (ref 1.5–2.5)
ALP SERPL-CCNC: 77 U/L (ref 25–100)
ALT SERPL W P-5'-P-CCNC: 25 U/L (ref 7–40)
ANION GAP SERPL CALCULATED.3IONS-SCNC: 8 MMOL/L (ref 3–11)
ARTICHOKE IGE QN: 138 MG/DL (ref 0–130)
AST SERPL-CCNC: 28 U/L (ref 0–33)
BASOPHILS # BLD AUTO: 0.04 10*3/MM3 (ref 0–0.2)
BASOPHILS NFR BLD AUTO: 0.5 % (ref 0–1)
BILIRUB SERPL-MCNC: 1 MG/DL (ref 0.3–1.2)
BUN BLD-MCNC: 16 MG/DL (ref 9–23)
BUN/CREAT SERPL: 22.9 (ref 7–25)
CALCIUM SPEC-SCNC: 10.1 MG/DL (ref 8.7–10.4)
CHLORIDE SERPL-SCNC: 97 MMOL/L (ref 99–109)
CHOLEST SERPL-MCNC: 242 MG/DL (ref 0–200)
CO2 SERPL-SCNC: 31 MMOL/L (ref 20–31)
CREAT BLD-MCNC: 0.7 MG/DL (ref 0.6–1.3)
DEPRECATED RDW RBC AUTO: 45.2 FL (ref 37–54)
EOSINOPHIL # BLD AUTO: 0.15 10*3/MM3 (ref 0–0.3)
EOSINOPHIL NFR BLD AUTO: 1.8 % (ref 0–3)
ERYTHROCYTE [DISTWIDTH] IN BLOOD BY AUTOMATED COUNT: 13.5 % (ref 11.3–14.5)
GFR SERPL CREATININE-BSD FRML MDRD: 84 ML/MIN/1.73
GLOBULIN UR ELPH-MCNC: 2.9 GM/DL
GLUCOSE BLD-MCNC: 111 MG/DL (ref 70–100)
HCT VFR BLD AUTO: 40.3 % (ref 34.5–44)
HCV AB SER DONR QL: NORMAL
HDLC SERPL-MCNC: 96 MG/DL (ref 40–60)
HGB BLD-MCNC: 13.4 G/DL (ref 11.5–15.5)
IMM GRANULOCYTES # BLD: 0.04 10*3/MM3 (ref 0–0.03)
IMM GRANULOCYTES NFR BLD: 0.5 % (ref 0–0.6)
LYMPHOCYTES # BLD AUTO: 2.73 10*3/MM3 (ref 0.6–4.8)
LYMPHOCYTES NFR BLD AUTO: 32.5 % (ref 24–44)
MCH RBC QN AUTO: 30.6 PG (ref 27–31)
MCHC RBC AUTO-ENTMCNC: 33.3 G/DL (ref 32–36)
MCV RBC AUTO: 92 FL (ref 80–99)
MONOCYTES # BLD AUTO: 0.69 10*3/MM3 (ref 0–1)
MONOCYTES NFR BLD AUTO: 8.2 % (ref 0–12)
NEUTROPHILS # BLD AUTO: 4.79 10*3/MM3 (ref 1.5–8.3)
NEUTROPHILS NFR BLD AUTO: 57 % (ref 41–71)
PLATELET # BLD AUTO: 254 10*3/MM3 (ref 150–450)
PMV BLD AUTO: 11.3 FL (ref 6–12)
POTASSIUM BLD-SCNC: 3.6 MMOL/L (ref 3.5–5.5)
PROT SERPL-MCNC: 8.1 G/DL (ref 5.7–8.2)
RBC # BLD AUTO: 4.38 10*6/MM3 (ref 3.89–5.14)
SODIUM BLD-SCNC: 136 MMOL/L (ref 132–146)
TRIGL SERPL-MCNC: 100 MG/DL (ref 0–150)
TSH SERPL DL<=0.05 MIU/L-ACNC: 1.89 MIU/ML (ref 0.35–5.35)
WBC NRBC COR # BLD: 8.4 10*3/MM3 (ref 3.5–10.8)

## 2018-04-16 PROCEDURE — G0009 ADMIN PNEUMOCOCCAL VACCINE: HCPCS | Performed by: FAMILY MEDICINE

## 2018-04-16 PROCEDURE — 80053 COMPREHEN METABOLIC PANEL: CPT | Performed by: FAMILY MEDICINE

## 2018-04-16 PROCEDURE — 84443 ASSAY THYROID STIM HORMONE: CPT | Performed by: FAMILY MEDICINE

## 2018-04-16 PROCEDURE — 36415 COLL VENOUS BLD VENIPUNCTURE: CPT | Performed by: FAMILY MEDICINE

## 2018-04-16 PROCEDURE — 99214 OFFICE O/P EST MOD 30 MIN: CPT | Performed by: FAMILY MEDICINE

## 2018-04-16 PROCEDURE — 90670 PCV13 VACCINE IM: CPT | Performed by: FAMILY MEDICINE

## 2018-04-16 PROCEDURE — 80061 LIPID PANEL: CPT | Performed by: FAMILY MEDICINE

## 2018-04-16 PROCEDURE — 86803 HEPATITIS C AB TEST: CPT | Performed by: FAMILY MEDICINE

## 2018-04-16 PROCEDURE — 85025 COMPLETE CBC W/AUTO DIFF WBC: CPT | Performed by: FAMILY MEDICINE

## 2018-04-16 RX ORDER — METAPROTERENOL SULFATE 10 MG
500 TABLET ORAL DAILY
COMMUNITY

## 2018-04-16 RX ORDER — ATORVASTATIN CALCIUM 10 MG/1
10 TABLET, FILM COATED ORAL DAILY
Qty: 90 TABLET | Refills: 3 | Status: SHIPPED | OUTPATIENT
Start: 2018-04-16 | End: 2019-03-12 | Stop reason: SDUPTHER

## 2018-04-16 RX ORDER — OMEPRAZOLE 40 MG/1
40 CAPSULE, DELAYED RELEASE ORAL DAILY
Qty: 90 CAPSULE | Refills: 3 | Status: SHIPPED | OUTPATIENT
Start: 2018-04-16 | End: 2019-03-12 | Stop reason: SDUPTHER

## 2018-04-16 RX ORDER — LOSARTAN POTASSIUM 50 MG/1
50 TABLET ORAL DAILY
Qty: 90 TABLET | Refills: 3 | Status: SHIPPED | OUTPATIENT
Start: 2018-04-16 | End: 2019-03-12 | Stop reason: SDUPTHER

## 2018-04-16 RX ORDER — GLUCOSAMINE/CHONDR SU A SOD 750-600 MG
1 TABLET ORAL DAILY
COMMUNITY

## 2018-04-16 RX ORDER — HYDROCHLOROTHIAZIDE 25 MG/1
25 TABLET ORAL DAILY
Qty: 90 TABLET | Refills: 3 | Status: SHIPPED | OUTPATIENT
Start: 2018-04-16 | End: 2019-03-12 | Stop reason: SDUPTHER

## 2018-04-16 RX ORDER — HYDROCORTISONE ACETATE 0.5 %
1500 CREAM (GRAM) TOPICAL 2 TIMES DAILY
COMMUNITY

## 2018-04-16 RX ORDER — AMLODIPINE BESYLATE 5 MG/1
5 TABLET ORAL DAILY
Qty: 90 TABLET | Refills: 3 | Status: SHIPPED | OUTPATIENT
Start: 2018-04-16 | End: 2019-03-12 | Stop reason: SDUPTHER

## 2018-04-16 NOTE — PROGRESS NOTES
"Subjective   Ana Alaniz is a 63 y.o. female.     History of Present Illness   The patient is here for a follow up on Hypertension.    She states she is doing well.  Did have a mild chest pain yesterday.  Gets shortness of breath fairly easy with exertion.  Having some aching in both arms. About a 4 on the pain scale of 1-10.    BP today is 120/80  Taking Amlodipine 5 mg ,Hctz 25 mg and Losartan 50 mg daily.    She states she saw Dr Killian in April 2017 and had a stress test. She is scheduled to see him on Friday.      The following portions of the patient's history were reviewed and updated as appropriate: allergies, current medications, past social history and problem list.    Review of Systems   Constitutional: Negative for chills, fever and unexpected weight change.   HENT: Negative for congestion.    Eyes: Negative for visual disturbance.   Respiratory: Positive for shortness of breath.    Cardiovascular: Positive for chest pain.   Gastrointestinal: Negative for abdominal distention, abdominal pain and nausea.   Genitourinary: Negative for difficulty urinating.   Musculoskeletal: Negative for gait problem.   Neurological: Negative for speech difficulty and headaches.   Hematological: Does not bruise/bleed easily.   Psychiatric/Behavioral: Negative for behavioral problems.       Objective   /80   Pulse 107   Temp 98.2 °F (36.8 °C) (Oral)   Resp 20   Ht 162.6 cm (64\")   Wt 81.2 kg (179 lb)   SpO2 96%   BMI 30.73 kg/m²   Physical Exam   Constitutional: She is oriented to person, place, and time. She appears well-developed and well-nourished. No distress.   Eyes: Pupils are equal, round, and reactive to light.   Neck: Normal range of motion. No thyromegaly present.   Cardiovascular: Normal rate, regular rhythm, normal heart sounds and intact distal pulses.  Exam reveals no friction rub.    No murmur heard.  Pulmonary/Chest: Effort normal and breath sounds normal. No respiratory distress. She has no " wheezes. She has no rales. She exhibits no tenderness.   Abdominal: There is no tenderness.   Neurological: She is alert and oriented to person, place, and time.   Skin: She is not diaphoretic.   Nursing note and vitals reviewed.      Assessment/Plan   Problem List Items Addressed This Visit        Cardiovascular and Mediastinum    Hypertension - Primary    Hyperlipidemia       Digestive    Gastroesophageal reflux disease      Other Visit Diagnoses     Shortness of breath on exertion        Bilateral arm pain        Need for hepatitis C screening test        Immunization due                  Drink plenty fluids.  Do some stretching exercises of the arms.    Continue medications as doing.    Refills sent for 1 year to [facility] for Amlodipine,Atorvastatin,Omeprazole,Hctz and Losartan.    Check a Hep C ab,CBC,CMP,Lipids, and TSH. Report results by letter.      Check a CT of the chest and an xray of the cervical spine.Report results by letter.    Follow up as needed.              Scribed for Dr Tom Bernal by Nereyda Schwartz CMA.          I, Tom Bernal MD, personally performed the services described in this documentation, as scribed by Nereyda Schwartz in my presence, and is both accurate and complete.

## 2018-04-19 NOTE — PROGRESS NOTES
[facility] at [facility]  Consultation H&P  Ana Alaniz  1955  [phone]    VISIT DATE:  4/20/2018     PCP: Tom Bernal MD  [address]  [facility] [place] [address]    IDENTIFICATION: A 63 y.o. female from [place]  CC:  Chief Complaint   Patient presents with   • Chest Pain   • Shortness of Breath   • Arm Pain     both arms       PROBLEM LIST:  1. CP  1. 5/18/17 MPS: Exercise Cardiolite WNL, EF 70%, exercise 6:30  2. HTN  3. HLD  1. Lipids 3/30/17:   238/67/99/131  2. ASCVD 10 year risk = 4.8%  3. 4/16/18 lipids:   HDL 96   4. LUKASZ- on CPAP since 2013  5. GERD  6. OA    Allergies  Allergies   Allergen Reactions   • Tetanus Toxoids Other (See Comments)     Unknown reaction   • Ace Inhibitors Cough       Current Medications    Current Outpatient Prescriptions:   •  amLODIPine (NORVASC) 5 MG tablet, Take 1 tablet by mouth Daily., Disp: 90 tablet, Rfl: 3  •  atorvastatin (LIPITOR) 10 MG tablet, Take 1 tablet by mouth Daily., Disp: 90 tablet, Rfl: 3  •  fexofenadine (ALLEGRA) 180 MG tablet, Take  by mouth., Disp: , Rfl:   •  Flaxseed, Linseed, (FLAX SEED OIL) 1300 MG capsule, Take  by mouth., Disp: , Rfl:   •  Glucosamine-Chondroit-Vit C-Mn (GLUCOSAMINE CHONDR 1500 COMPLX) capsule, Take 1,500 mg by mouth 2 (Two) Times a Day., Disp: , Rfl:   •  hydrochlorothiazide (HYDRODIURIL) 25 MG tablet, Take 1 tablet by mouth Daily., Disp: 90 tablet, Rfl: 3  •  losartan (COZAAR) 50 MG tablet, Take 1 tablet by mouth Daily., Disp: 90 tablet, Rfl: 3  •  Lutein-Zeaxanthin 25-5 MG capsule, Take  by mouth., Disp: , Rfl:   •  Multiple Vitamin (MULTI VITAMIN) tablet, Take  by mouth., Disp: , Rfl:   •  Multiple Vitamins-Minerals (PRESERVISION AREDS 2 PO), Take  by mouth Daily., Disp: , Rfl:   •  nitroglycerin (NITROSTAT) 0.4 MG SL tablet, Place 1 tablet under the tongue Every 5 (Five) Minutes As Needed for Chest Pain. Take no more than 3 doses in 15 minutes., Disp: 20 tablet,  "Rfl: 0  •  Omega-3 Fatty Acids (OMEGA-3 FISH OIL) 500 MG capsule, Take 500 mg by mouth Daily., Disp: , Rfl:   •  omeprazole (priLOSEC) 40 MG capsule, Take 1 capsule by mouth Daily., Disp: 90 capsule, Rfl: 3     History of Present Illness   HPI  This is a 62 year old  female with a PMH significant for hypertension, hyperlipidemia, obstructive sleep apnea is presenting for follow-up.  She's had some bilateral arm discomfort that is constant with no exertional component.  She is scheduled to have a cervical spine evaluation upcoming.  She has retired and is now assisting the care of her 4-year-old grandchildren  ROS  ROS ROS all normal except what is described in the HPI      SOCIAL HX  Social History     Social History   • Marital status:      Spouse name: N/A   • Number of children: N/A   • Years of education: N/A     Occupational History   • Not on file.     Social History Main Topics   • Smoking status: Never Smoker   • Smokeless tobacco: Never Used   • Alcohol use 1.2 - 2.4 oz/week     1 - 2 Glasses of wine, 1 - 2 Cans of beer per week      Comment: nightly   • Drug use: No   • Sexual activity: Defer     Other Topics Concern   • Not on file     Social History Narrative   • No narrative on file       FAMILY HX  Family History   Problem Relation Age of Onset   • Heart disease Mother    • Hypertension Mother    • Kidney disease Mother    • Clotting disorder Mother    • Heart attack Mother    • Hypertension Father    • Stroke Father    • Coronary artery disease Father    • Clotting disorder Father    • Cancer Other        Vitals:    04/20/18 1001   Weight: 82.1 kg (181 lb)   Height: 162.6 cm (64\")       PHYSICAL EXAMINATION:  Physical Exam   Constitutional: She is oriented to person, place, and time. She appears well-developed and well-nourished. No distress.   HENT:   Head: Normocephalic and atraumatic.   Right Ear: External ear normal.   Left Ear: External ear normal.   Nose: Nose normal.   Eyes: " Conjunctivae and EOM are normal.   Neck: Neck supple. No hepatojugular reflux and no JVD present. Carotid bruit is not present. No thyromegaly present.   Cardiovascular: Normal rate, regular rhythm, S1 normal, S2 normal, normal heart sounds, intact distal pulses and normal pulses.  Exam reveals no gallop, no distant heart sounds and no midsystolic click.    No murmur heard.  Pulses:       Radial pulses are 2+ on the right side, and 2+ on the left side.        Dorsalis pedis pulses are 2+ on the right side, and 2+ on the left side.        Posterior tibial pulses are 2+ on the right side, and 2+ on the left side.   Pulmonary/Chest: Effort normal and breath sounds normal. No respiratory distress. She has no decreased breath sounds. She has no wheezes. She has no rhonchi. She has no rales.   Abdominal: Soft. Bowel sounds are normal. There is no hepatosplenomegaly. There is no tenderness.   Musculoskeletal: Normal range of motion. She exhibits no edema.   Neurological: She is alert and oriented to person, place, and time.   No focal deficits.   Skin: Skin is warm and dry. No erythema.   Psychiatric: She has a normal mood and affect. Thought content normal.   Nursing note and vitals reviewed.      Diagnostic Data:  Procedures  Lab Results   Component Value Date    CHLPL 242 (H) 03/17/2016    TRIG 100 04/16/2018    HDL 96 (H) 04/16/2018     Lab Results   Component Value Date    GLUCOSE 111 (H) 04/16/2018    BUN 16 04/16/2018    CREATININE 0.70 04/16/2018     04/16/2018    K 3.6 04/16/2018    CL 97 (L) 04/16/2018    CO2 31.0 04/16/2018     Lab Results   Component Value Date    HGBA1C 5.5 07/16/2015     Lab Results   Component Value Date    WBC 8.40 04/16/2018    HGB 13.4 04/16/2018    HCT 40.3 04/16/2018     04/16/2018       ASSESSMENT:   Diagnosis Plan   1. Essential hypertension     2. Mixed hyperlipidemia            PLAN:  1.  Atypical chest pain did discuss potentially having a calcium scoring heart scan if  there is a discount upcoming.  2.  Well controlled on amlodipine and HCTZ  3.  Well controlled, HDL 99, on statin therapy       Naga Killian MD, FACC

## 2018-04-20 ENCOUNTER — OFFICE VISIT (OUTPATIENT)
Dept: CARDIOLOGY | Facility: CLINIC | Age: 63
End: 2018-04-20

## 2018-04-20 VITALS
DIASTOLIC BLOOD PRESSURE: 70 MMHG | HEART RATE: 77 BPM | WEIGHT: 181 LBS | HEIGHT: 64 IN | SYSTOLIC BLOOD PRESSURE: 110 MMHG | BODY MASS INDEX: 30.9 KG/M2

## 2018-04-20 DIAGNOSIS — E78.2 MIXED HYPERLIPIDEMIA: ICD-10-CM

## 2018-04-20 DIAGNOSIS — I10 ESSENTIAL HYPERTENSION: Primary | ICD-10-CM

## 2018-04-20 PROCEDURE — 99213 OFFICE O/P EST LOW 20 MIN: CPT | Performed by: INTERNAL MEDICINE

## 2018-04-23 ENCOUNTER — HOSPITAL ENCOUNTER (OUTPATIENT)
Dept: CT IMAGING | Facility: HOSPITAL | Age: 63
Discharge: HOME | End: 2018-04-23
Attending: FAMILY MEDICINE | Admitting: FAMILY MEDICINE

## 2018-04-23 ENCOUNTER — HOSPITAL ENCOUNTER (OUTPATIENT)
Dept: GENERAL RADIOLOGY | Facility: HOSPITAL | Age: 63
Discharge: HOME | End: 2018-04-23
Attending: FAMILY MEDICINE

## 2018-04-23 DIAGNOSIS — M79.601 BILATERAL ARM PAIN: ICD-10-CM

## 2018-04-23 DIAGNOSIS — M79.602 BILATERAL ARM PAIN: ICD-10-CM

## 2018-04-23 DIAGNOSIS — R06.02 SHORTNESS OF BREATH ON EXERTION: ICD-10-CM

## 2018-04-23 PROCEDURE — 71250 CT THORAX DX C-: CPT

## 2018-04-23 PROCEDURE — 72040 X-RAY EXAM NECK SPINE 2-3 VW: CPT

## 2018-04-27 DIAGNOSIS — M50.90 CERVICAL DISC DISEASE: Primary | ICD-10-CM

## 2018-05-02 ENCOUNTER — TELEPHONE (OUTPATIENT)
Dept: FAMILY MEDICINE CLINIC | Facility: CLINIC | Age: 63
End: 2018-05-02

## 2018-05-02 NOTE — TELEPHONE ENCOUNTER
Left message for pt to call back. Called pt regarding test result question she asked. I wan=vaughn to let her know Dr. Bernal is out of office and will call her when he returns.

## 2018-05-09 ENCOUNTER — HOSPITAL ENCOUNTER (OUTPATIENT)
Dept: PHYSICAL THERAPY | Facility: HOSPITAL | Age: 63
Setting detail: THERAPIES SERIES
End: 2018-05-09

## 2018-05-09 ENCOUNTER — HOSPITAL ENCOUNTER (OUTPATIENT)
Dept: PHYSICAL THERAPY | Facility: HOSPITAL | Age: 63
Setting detail: THERAPIES SERIES
Discharge: HOME | End: 2018-05-09

## 2018-05-09 DIAGNOSIS — M50.30 DDD (DEGENERATIVE DISC DISEASE), CERVICAL: Primary | ICD-10-CM

## 2018-05-09 PROCEDURE — G8985 CARRY GOAL STATUS: HCPCS | Performed by: PHYSICAL THERAPIST

## 2018-05-09 PROCEDURE — 97162 PT EVAL MOD COMPLEX 30 MIN: CPT | Performed by: PHYSICAL THERAPIST

## 2018-05-09 PROCEDURE — G8984 CARRY CURRENT STATUS: HCPCS | Performed by: PHYSICAL THERAPIST

## 2018-05-09 NOTE — THERAPY EVALUATION
Outpatient Physical Therapy Ortho Initial Evaluation   Tom     Patient Name: Ana Alaniz  : 1955  MRN: [id number]  Today's Date: 2018      Visit Date: 2018    Patient Active Problem List   Diagnosis   • Severe obstructive sleep apnea   • Hypertension   • Degenerative joint disease   • Chronic rhinitis   • Hyperlipidemia   • Gastroesophageal reflux disease        Past Medical History:   Diagnosis Date   • Acute pharyngitis    • Allergy    • Arrhythmia    • Degenerative arthritis    • Degenerative joint disease 2016   • Epistaxis    • Glaucoma    • Hypertension    • Positive KENY (antinuclear antibody)    • Right foot pain    • Sinusitis    • Sleep apnea    • Transfusion history    • URI (upper respiratory infection)         Past Surgical History:   Procedure Laterality Date   • APPENDECTOMY     • CATARACT EXTRACTION, BILATERAL     • OTHER SURGICAL HISTORY      LT Knee scar excision   • REPLACEMENT TOTAL KNEE Right    • TOTAL KNEE ARTHROPLASTY Left        Visit Dx:     ICD-10-CM ICD-9-CM   1. DDD (degenerative disc disease), cervical M50.30 722.4             Patient History     Row Name 18 1600             History    Chief Complaint Pain  -SERJIO      Type of Pain Neck pain;Upper Extremity / Arm;Other pain   interscapular pain  -SERJIO      Date Current Problem(s) Began --   More than 1 year ago  -SERJIO      Brief Description of Current Complaint This 63 year-old female reports presence of current symptoms for more than 1 year.  Symptoms vary in intensity.  Pt. describes neck pain, interscapular pain, and arm pain, mostly in her upper arms.  -SERJIO      Patient/Caregiver Goals Relieve pain  -SERJIO      Hand Dominance right-handed  -SERJIO      Occupation/sports/leisure activities Retired , enjoys reading, walking, and spending time with grandchildren.  -SERJIO      How has patient tried to help current problem? heat sometimes seems to help  -SERJIO      What clinical tests have you had for  this problem? X-ray  -SERJIO      Results of Clinical Tests Substantial arthropathy most notable at C6-7.  -SERJIO         Pain     Pain Location Neck;Arm;Back  -SERJIO      Pain at Present 4  -SERJIO      Pain at Best 2  -SERJIO      Pain at Worst 7  -SERJIO      Pain Frequency Constant/continuous   but at variable intensity  -SERJIO      What Performance Factors Make the Current Problem(s) WORSE? vacuuming, after sleeping  -SERJIO      Total hours of sleep per night 7-8  -SEJRIO      What position do you sleep in? Right sidelying;Left sidelying   has CPAP device  -SERJIO         Fall Risk Assessment    Any falls in the past year: No  -SERJIO         Daily Activities    Primary Language English  -SERJIO      Are you able to read Yes  -SERJIO      Are you able to write Yes  -SERJIO      Teaching needs identified Home Exercise Program;Management of Condition  -SERJIO      Patient is concerned about/has problems with Performing home management (household chores, shopping, care of dependents)  -SERJIO      Pt Participated in POC and Goals Yes  -SERJIO         Safety    Are you being hurt, hit, or frightened by anyone at home or in your life? No  -SERJIO      Are you being neglected by a caregiver No  -SERJIO        User Key  (r) = Recorded By, (t) = Taken By, (c) = Cosigned By    Initials Name Provider Type    SERJIO Garcia PT Physical Therapist                PT Ortho     Row Name 05/09/18 1600       Posture/Observations    Posture/Observations Comments FHP, flattened vercical lordosis, increased CT kyphosis  -SERJIO       DTR- Upper Quarter Clearing    Biceps (C5/6) Bilateral:;2- Normal response  -SERJIO    Brachioradialis (C6) Bilateral:;2- Normal response  -SERJIO    Triceps (C7) Bilateral:;1- Minimal response  -SERJIO       Neural Tension Signs- Upper Quarter Clearing    ULNTT 1 Bilateral:;Postive  -SERJIO    ULNTT 2 Bilateral:;Postive  -SERJIO    ULNTT 3 Bilateral:;Postive  -SERJIO    ULNTT 4 Bilateral:;Negative  -SERJIO       Sensory Screen for Light Touch- Upper Quarter Clearing    C4 (posterior shoulder)  Intact  -SERJIO    C5 (lateral upper arm) Intact  -SERJIO    C6 (tip of thumb) Intact  -SERJIO    C7 (tip of 3rd finger) Intact  -SERJIO    C8 (tip of 5th finger) Intact  -SERJIO    T1 (medial lower arm) Intact  -SERJIO       Myotomal Screen- Upper Quarter Clearing    Shoulder flexion (C5) Bilateral:;5 (Normal)  -SERJIO    Elbow flexion/wrist extension (C6) Bilateral:;4+ (Good +)  -SERJIO    Elbow extension/wrist flexion (C7) Bilateral:;5 (Normal)  -SERJIO       General ROM    GENERAL ROM COMMENTS UE AROM is WNL's.  -SERJIO       Head/Neck/Trunk    Neck Extension AROM 65   less painful than flexion  -SERJIO    Neck Flexion AROM 55   increases CT pain  -SERJIO    Neck Lt Lateral Flexion AROM 31   pulls R  -SERJIO    Neck Rt Lateral Flexion AROM 32  -SERJIO    Neck Lt Rotation AROM 61  -SERJIO    Neck Rt Rotation AROM 60  -SERJIO      User Key  (r) = Recorded By, (t) = Taken By, (c) = Cosigned By    Initials Name Provider Type    SERJIO Garcia PT Physical Therapist           Hand Therapy (last 24 hours)      Hand Eval     Row Name 05/09/18 1600              Strength Right    # Reps 3  -SERJIO      Right Rung 2   R hand dominant  -SERJIO      Right  Test 1 49  -SERJIO      Right  Test 2 47  -SERJIO      Right  Test 3 47  -SERJIO       Strength Average Right 47.67  -SERJIO          Strength Left    # Reps 3  -SERJIO      Left Rung 2  -SERJIO      Left  Test 1 46  -SERJIO      Left  Test 2 47  -SERJIO      Left  Test 3 45  -SERJIO       Strength Average Left 46  -SERJIO        User Key  (r) = Recorded By, (t) = Taken By, (c) = Cosigned By    Initials Name Provider Type    SERJIO Garcia PT Physical Therapist                    Therapy Education  Given: HEP, Symptoms/condition management, Posture/body mechanics  Program: New  How Provided: Verbal, Demonstration, Written  Provided to: Patient  Level of Understanding: Verbalized, Demonstrated           PT OP Goals     Row Name 05/09/18 1600          PT Short Term Goals    STG Date to Achieve 06/06/18  -SERJIO     STG 1 Pt.  demonstrates independence in initial HEP.  -     STG 2 Pt. reports reduction in frequency, intensity, or distribution of symptoms.  -     STG 3 Neural tension symptoms show improvement compared to baseline measures.  -     STG 4 Pt. demonstrates improving awareness of correct posture.  -        Long Term Goals    LTG Date to Achieve 07/04/18  -     LTG 1 Pt. is independent in self-management of any remaining symptoms.  -     LTG 2 Symptoms are intermittent, mild, and do not interfere with daily activities.  -     LTG 3 Cervical ROM and neural pliability are WNL's without increase in pain.  -        Time Calculation    PT Goal Re-Cert Due Date 08/07/18  -       User Key  (r) = Recorded By, (t) = Taken By, (c) = Cosigned By    Initials Name Provider Type    SERJIO Garcia PT Physical Therapist                PT Assessment/Plan     Row Name 05/09/18 7962          PT Assessment    Functional Limitations Limitation in home management;Limitations in functional capacity and performance  -     Impairments Range of motion;Pain;Posture;Poor body mechanics;Impaired flexibility  -     Assessment Comments Clinical presentation consistent with diagnosis and x-ray findings.  Pt. will benefit from PT intervention to address symptoms and to educate in self-management of chronic recurring symptoms.  -     Please refer to paper survey for additional self-reported information Yes  -SERJIO     Rehab Potential Good  -SERJIO     Patient/caregiver participated in establishment of treatment plan and goals Yes  -SERJIO     Patient would benefit from skilled therapy intervention Yes  -SERJIO        PT Plan    PT Frequency 2x/week  -     Predicted Duration of Therapy Intervention (OT Eval) 12 visits  -     PT Plan Comments PT per POC up to 2x/week.  -       User Key  (r) = Recorded By, (t) = Taken By, (c) = Cosigned By    Initials Name Provider Type    SERJIO Garcia PT Physical Therapist                               Outcome Measure Options: Quick DASH, Neck Disability Index (NDI)  Quick DASH  Open a tight or new jar.: Mild Difficulty  Do heavy household chores (e.g., wash walls, wash floors): Moderate Difficulty  Carry a shopping bag or briefcase: No Difficulty  Wash your back: Moderate Difficulty  Use a knife to cut food: No Difficulty  During the past week, were you limited in your work or other regular daily activities as a result of your arm, shoulder or hand problem?: Moderately Limited  Arm, Shoulder, or hand pain: Moderate  Tingling (pins and needles) in your arm, shoulder, or hand: Moderate  During the past week, how much difficulty have you had sleeping because of the pain in your arm, shoulder or hand?: No difficulty  Number of Questions Answered: 9  Quick DASH Score: 30.56  Neck Disability Index  Section 1 - Pain Intensity: The pain is very mild at the moment.  Section 2 - Personal Care: I can look after myself normally without causing extra pain.  Section 3 - Lifting: I can lift heavy weights, but it gives me extra pain.  Section 4 - Work: I can do most of my usual work, but no more  Section 5 - Headaches: I have slight headaches that come infrequently.  Section 6 - Concentration: I can concentrate fully without difficulty.  Section 7 - Sleeping: I have no trouble sleeping.  Section 8 - Driving: I can drive my car without neck pain  Section 9 - Reading: I can read as much as I want with slight neck pain.  Section 10 - Recreation: I have some neck pain with all recreational activities.  Neck Disability Index Score: 7  Neck Disability Index Comments: 14%      Time Calculation:   Start Time: 1510     Therapy Charges for Today     Code Description Service Date Service Provider Modifiers Qty    75155727082 HC PT CARRY MOV HAND OBJ CURRENT 5/9/2018 Shabnam Garcia, PT GP, CJ 1    18939769667 HC PT CARRY MOV HAND OBJ PROJECTED 5/9/2018 Shabnam Garcia, PT GP, CI 1    02992560698 HC PT EVAL MOD COMPLEXITY 4 5/9/2018  Shabnam Garcia, PT GP 1          PT G-Codes  PT Professional Judgement Used?: Yes  Outcome Measure Options: Quick DASH, Neck Disability Index (NDI)  Score: Quick DASH 30.56, NDI 14%  Functional Limitation: Carrying, moving and handling objects  Carrying, Moving and Handling Objects Current Status (): At least 20 percent but less than 40 percent impaired, limited or restricted  Carrying, Moving and Handling Objects Goal Status (): At least 1 percent but less than 20 percent impaired, limited or restricted         Shabnam Garcia, PT  5/9/2018

## 2018-05-22 ENCOUNTER — HOSPITAL ENCOUNTER (OUTPATIENT)
Dept: PHYSICAL THERAPY | Facility: HOSPITAL | Age: 63
Setting detail: THERAPIES SERIES
Discharge: HOME | End: 2018-05-22

## 2018-05-22 DIAGNOSIS — M50.30 DDD (DEGENERATIVE DISC DISEASE), CERVICAL: Primary | ICD-10-CM

## 2018-05-22 PROCEDURE — 97110 THERAPEUTIC EXERCISES: CPT | Performed by: PHYSICAL THERAPIST

## 2018-05-22 NOTE — THERAPY TREATMENT NOTE
Outpatient Physical Therapy Ortho Treatment Note   [place]     Patient Name: Ana Alaniz  : 1955  MRN: [id number]  Today's Date: 2018      Visit Date: 2018    Visit Dx:    ICD-10-CM ICD-9-CM   1. DDD (degenerative disc disease), cervical M50.30 722.4       Patient Active Problem List   Diagnosis   • Severe obstructive sleep apnea   • Hypertension   • Degenerative joint disease   • Chronic rhinitis   • Hyperlipidemia   • Gastroesophageal reflux disease        Past Medical History:   Diagnosis Date   • Acute pharyngitis    • Allergy    • Arrhythmia    • Degenerative arthritis    • Degenerative joint disease 2016   • Epistaxis    • Glaucoma    • Hypertension    • Positive KENY (antinuclear antibody)    • Right foot pain    • Sinusitis    • Sleep apnea    • Transfusion history    • URI (upper respiratory infection)         Past Surgical History:   Procedure Laterality Date   • APPENDECTOMY     • CATARACT EXTRACTION, BILATERAL     • OTHER SURGICAL HISTORY      LT Knee scar excision   • REPLACEMENT TOTAL KNEE Right    • TOTAL KNEE ARTHROPLASTY Left              PT Ortho     Row Name 18 0800       Subjective Comments    Subjective Comments Pt. reports mild discomfort localized to her lower cervical spine.  She indicates that arm symptoms are better since beginning exercise.  -SERIJO       Subjective Pain    Able to rate subjective pain? yes  -SERJIO    Pre-Treatment Pain Level 3  -SERJIO    Post-Treatment Pain Level 3  -SERJIO      User Key  (r) = Recorded By, (t) = Taken By, (c) = Cosigned By    Initials Name Provider Type    SERJIO Garcia, PT Physical Therapist                            PT Assessment/Plan     Row Name 18 0800          PT Assessment    Assessment Comments Pt. demonstrates compliance with HEP and notices improvement in symptoms since beginning exercises.  -SERJIO        PT Plan    PT Plan Comments Continue PT.  Progress exercises as tolerated.  Add scapular stability  exercises at next session.  -SERJIO       User Key  (r) = Recorded By, (t) = Taken By, (c) = Cosigned By    Initials Name Provider Type    SERJIO Garcia PT Physical Therapist                    Exercises     Row Name 05/22/18 0800             Subjective Comments    Subjective Comments Pt. reports mild discomfort localized to her lower cervical spine.  She indicates that arm symptoms are better since beginning exercise.  -SERJIO         Subjective Pain    Able to rate subjective pain? yes  -SERJIO      Pre-Treatment Pain Level 3  -SERJIO      Post-Treatment Pain Level 3  -SERJIO         Exercise 1    Exercise Name 1 Reviewed existing HEP.  Pt. feels that exercises in upright position are more beneficial for her than supine exercises.  Added UT and levator stretch to HEP today.    -SERJIO      Time 1 15  -SERJIO        User Key  (r) = Recorded By, (t) = Taken By, (c) = Cosigned By    Initials Name Provider Type    SERJIO Garcia PT Physical Therapist                        Manual Rx (last 36 hours)      Manual Treatments     Row Name 05/22/18 0700             Manual Rx 1    Manual Rx 1 Location CT junction  -SERJIO      Manual Rx 1 Type PA glides to CT junction and assisted UT and levator stretch in sitting.  -SERJIO      Manual Rx 1 Duration 5  -SERJIO        User Key  (r) = Recorded By, (t) = Taken By, (c) = Cosigned By    Initials Name Provider Type    SERJIO Garcia PT Physical Therapist                             Time Calculation:   Start Time: 0815  Total Timed Code Minutes- PT: 20 minute(s)    Therapy Charges for Today     Code Description Service Date Service Provider Modifiers Qty    83869358559  PT THER PROC EA 15 MIN 5/22/2018 Shabnam Garcia PT GP 1                    Shabnam Garcia PT  5/22/2018

## 2018-05-30 ENCOUNTER — HOSPITAL ENCOUNTER (OUTPATIENT)
Dept: PHYSICAL THERAPY | Facility: HOSPITAL | Age: 63
Setting detail: THERAPIES SERIES
Discharge: HOME | End: 2018-05-30

## 2018-05-30 DIAGNOSIS — M50.30 DDD (DEGENERATIVE DISC DISEASE), CERVICAL: Primary | ICD-10-CM

## 2018-05-30 PROCEDURE — 97110 THERAPEUTIC EXERCISES: CPT | Performed by: PHYSICAL THERAPIST

## 2018-05-30 PROCEDURE — 97140 MANUAL THERAPY 1/> REGIONS: CPT | Performed by: PHYSICAL THERAPIST

## 2018-05-30 NOTE — THERAPY TREATMENT NOTE
Outpatient Physical Therapy Ortho Treatment Note   [facility]     Patient Name: Ana Alaniz  : 1955  MRN: [id number]  Today's Date: 2018      Visit Date: 2018    Visit Dx:    ICD-10-CM ICD-9-CM   1. DDD (degenerative disc disease), cervical M50.30 722.4       Patient Active Problem List   Diagnosis   • Severe obstructive sleep apnea   • Hypertension   • Degenerative joint disease   • Chronic rhinitis   • Hyperlipidemia   • Gastroesophageal reflux disease        Past Medical History:   Diagnosis Date   • Acute pharyngitis    • Allergy    • Arrhythmia    • Degenerative arthritis    • Degenerative joint disease 2016   • Epistaxis    • Glaucoma    • Hypertension    • Positive KENY (antinuclear antibody)    • Right foot pain    • Sinusitis    • Sleep apnea    • Transfusion history    • URI (upper respiratory infection)         Past Surgical History:   Procedure Laterality Date   • APPENDECTOMY     • CATARACT EXTRACTION, BILATERAL     • OTHER SURGICAL HISTORY      LT Knee scar excision   • REPLACEMENT TOTAL KNEE Right    • TOTAL KNEE ARTHROPLASTY Left              PT Ortho     Row Name 18 0900       Subjective Comments    Subjective Comments Pt. feels stiff this morning.  She has not done any exercise yet today.  -SERJIO       Subjective Pain    Able to rate subjective pain? yes  -SERJIO    Pre-Treatment Pain Level 4  -SERJIO    Post-Treatment Pain Level 2  -SERJIO      User Key  (r) = Recorded By, (t) = Taken By, (c) = Cosigned By    Initials Name Provider Type    SERJIO Garcia PT Physical Therapist                            PT Assessment/Plan     Row Name 18 0900          PT Assessment    Assessment Comments Pt. noted decrease in pain after treatment today.  -SERJIO        PT Plan    PT Plan Comments Continue and progress as tolerated.  -SERJIO       User Key  (r) = Recorded By, (t) = Taken By, (c) = Cosigned By    Initials Name Provider Type    SERJIO Garcia PT Physical Therapist                     Exercises     Row Name 05/30/18 0900             Subjective Comments    Subjective Comments Pt. feels stiff this morning.  She has not done any exercise yet today.  -SERJIO         Subjective Pain    Able to rate subjective pain? yes  -SERJIO      Pre-Treatment Pain Level 4  -SERJIO      Post-Treatment Pain Level 2  -SERJIO         Exercise 1    Exercise Name 1 Progressed HEP to include scapular exercises after successful practice in clinic today.  Pt. also performed neural gliding exercises for bilateral UE's in hooklying.  She may incorporate these into existing UE exercises.  -SERJIO      Time 1 15  -SERJIO        User Key  (r) = Recorded By, (t) = Taken By, (c) = Cosigned By    Initials Name Provider Type    SERJIO Garcia PT Physical Therapist                        Manual Rx (last 36 hours)      Manual Treatments     Row Name 05/30/18 0900             Manual Rx 1    Manual Rx 1 Location Cervical and upper thoracic spine, PA glides to upper thoracic segments.  -SERJIO      Manual Rx 1 Type manual cervical distraction, bilateral sideglides  -SERJIO      Manual Rx 1 Duration 10  -SERJIO        User Key  (r) = Recorded By, (t) = Taken By, (c) = Cosigned By    Initials Name Provider Type    SERJIO Garcia PT Physical Therapist                             Time Calculation:   Start Time: 0855  Total Timed Code Minutes- PT: 25 minute(s)    Therapy Charges for Today     Code Description Service Date Service Provider Modifiers Qty    35175520291  PT THER PROC EA 15 MIN 5/30/2018 Shabnam Garcia PT GP 1    [id number]  PT MANUAL THERAPY EA 15 MIN 5/30/2018 Shabnam Garcia PT GP 1                    Shabnam Garcia PT  5/30/2018

## 2018-06-13 ENCOUNTER — HOSPITAL ENCOUNTER (OUTPATIENT)
Dept: PHYSICAL THERAPY | Facility: HOSPITAL | Age: 63
Setting detail: THERAPIES SERIES
Discharge: HOME | End: 2018-06-13

## 2018-06-13 DIAGNOSIS — M50.30 DDD (DEGENERATIVE DISC DISEASE), CERVICAL: Primary | ICD-10-CM

## 2018-06-13 PROCEDURE — G8984 CARRY CURRENT STATUS: HCPCS | Performed by: PHYSICAL THERAPIST

## 2018-06-13 PROCEDURE — 97110 THERAPEUTIC EXERCISES: CPT

## 2018-06-13 PROCEDURE — G8985 CARRY GOAL STATUS: HCPCS | Performed by: PHYSICAL THERAPIST

## 2018-06-13 NOTE — THERAPY TREATMENT NOTE
Outpatient Physical Therapy Ortho Re-Assessment   Tom     Patient Name: Ana Alaniz  : 1955  MRN: [id number]  Today's Date: 2018      Visit Date: 2018    Patient Active Problem List   Diagnosis   • Severe obstructive sleep apnea   • Hypertension   • Degenerative joint disease   • Chronic rhinitis   • Hyperlipidemia   • Gastroesophageal reflux disease        Past Medical History:   Diagnosis Date   • Acute pharyngitis    • Allergy    • Arrhythmia    • Degenerative arthritis    • Degenerative joint disease 2016   • Epistaxis    • Glaucoma    • Hypertension    • Positive KENY (antinuclear antibody)    • Right foot pain    • Sinusitis    • Sleep apnea    • Transfusion history    • URI (upper respiratory infection)         Past Surgical History:   Procedure Laterality Date   • APPENDECTOMY     • CATARACT EXTRACTION, BILATERAL     • OTHER SURGICAL HISTORY      LT Knee scar excision   • REPLACEMENT TOTAL KNEE Right    • TOTAL KNEE ARTHROPLASTY Left        Visit Dx:     ICD-10-CM ICD-9-CM   1. DDD (degenerative disc disease), cervical M50.30 722.4             Patient History     Row Name 18 0800             Pain     Pain at Present 2  -SERJIO      Pain at Best 1  -SERJIO      Pain at Worst 4  -SERJIO        User Key  (r) = Recorded By, (t) = Taken By, (c) = Cosigned By    Initials Name Provider Type    SERJIO Garcia PT Physical Therapist                PT Ortho     Row Name 18 0800       Head/Neck/Trunk    Neck Extension AROM 74   no pain  -SERJIO    Neck Flexion AROM 46   no pain  -SERJIO    Neck Lt Lateral Flexion AROM 35  -SERJIO    Neck Rt Lateral Flexion AROM 35  -SERJIO    Neck Lt Rotation AROM 73  -SERJIO    Neck Rt Rotation AROM 72  -SERJIO      User Key  (r) = Recorded By, (t) = Taken By, (c) = Cosigned By    Initials Name Provider Type    SERJIO Garcia PT Physical Therapist                      Therapy Education  Given: HEP  Program: Progressed  How Provided: Verbal, Demonstration,  Written  Provided to: Patient  Level of Understanding: Demonstrated, Verbalized           PT OP Goals     Row Name 06/13/18 0800          PT Short Term Goals    STG Date to Achieve 06/06/18  -     STG 1 Pt. demonstrates independence in initial HEP.  -     STG 1 Progress Met  -     STG 2 Pt. reports reduction in frequency, intensity, or distribution of symptoms.  -     STG 2 Progress Met  -     STG 3 Neural tension symptoms show improvement compared to baseline measures.  -     STG 4 Pt. demonstrates improving awareness of correct posture.  -     STG 4 Progress Met  -        Long Term Goals    LTG Date to Achieve 07/04/18  -     LTG 1 Pt. is independent in self-management of any remaining symptoms.  -     LTG 1 Progress Progressing  -SERJIO     LTG 2 Symptoms are intermittent, mild, and do not interfere with daily activities.  -     LTG 2 Progress Progressing  -     LTG 3 Cervical ROM and neural pliability are WNL's without increase in pain.  -     LTG 3 Progress Progressing  -       User Key  (r) = Recorded By, (t) = Taken By, (c) = Cosigned By    Initials Name Provider Type    SERJIO Garcia, PT Physical Therapist                PT Assessment/Plan     Row Name 06/13/18 1441 06/13/18 1400       PT Assessment    Assessment Comments  -- Pt. is learning to self-manage symptoms and notices improvement with exercise performance.  -SERJIO    Please refer to paper survey for additional self-reported information Yes  -SERJIO  --    Rehab Potential Good  -SERJIO  --    Patient/caregiver participated in establishment of treatment plan and goals Yes  -SERJIO  --    Patient would benefit from skilled therapy intervention Yes  -SERJIO  --       PT Plan    PT Frequency 1x/week  -SERJIO  --    Predicted Duration of Therapy Intervention (Therapy Eval) up to 4 visits if needed  -SERJIO  --    Planned CPT's? PT EVAL MOD COMPLELITY: 87961;PT THER PROC EA 15 MIN: 79364;PT MANUAL THERAPY EA 15 MIN: 79217;PT NEUROMUSC RE-EDUCATION EA  15 MIN: 88740;PT TRACTION CERVICAL: 07601;PT HOT OR COLD PACK TREAT MCARE;PT ULTRASOUND EA 15 MIN: 49397;PT ELECTRICAL STIM UNATTEND:   -SERJIO  --    PT Plan Comments  -- Pt. will consider whether she feels ready to self-manage symptoms with HEP vs. continuing formal PT intervention.  She will cancel appointments if she continues to do well with HEP.  -SERJIO      User Key  (r) = Recorded By, (t) = Taken By, (c) = Cosigned By    Initials Name Provider Type    SERJIO Garcia PT Physical Therapist                  Exercises     Row Name 06/13/18 1442 06/13/18 0800          Subjective Comments    Subjective Comments  -- Pt. reports stiffness this morning.  It is early in the day, but overall, pt. notices improvement and she can tell that exercises help when she does them.  -SERJIO        Total Minutes    44165 - PT Therapeutic Exercise Minutes 30  -SERJIO  --        Exercise 1    Exercise Name 1  -- Reassessment completed and HEP progressed today to include neural gliding and scapular mobility exercises.  -SERJIO     Time 1  -- 30  -SERJIO       User Key  (r) = Recorded By, (t) = Taken By, (c) = Cosigned By    Initials Name Provider Type    SERJIO Garcia, SILVIA Physical Therapist                        Outcome Measure Options: Quick DASH, Neck Disability Index (NDI)  Quick DASH  Open a tight or new jar.: Mild Difficulty  Do heavy household chores (e.g., wash walls, wash floors): Mild Difficulty  Carry a shopping bag or briefcase: No Difficulty  Wash your back: Mild Difficulty  Use a knife to cut food: No Difficulty  During the past week, to what extent has your arm, shoulder, or hand problem interfered with your normal social activites with family, friends, neighbors or groups?: Not at all  During the past week, were you limited in your work or other regular daily activities as a result of your arm, shoulder or hand problem?: Not limited at all  Arm, Shoulder, or hand pain: Mild  Tingling (pins and needles) in your arm,  shoulder, or hand: Mild  During the past week, how much difficulty have you had sleeping because of the pain in your arm, shoulder or hand?: No difficulty  Number of Questions Answered: 10  Quick DASH Score: 12.5  Neck Disability Index  Section 1 - Pain Intensity: The pain is very mild at the moment.  Section 2 - Personal Care: I can look after myself normally without causing extra pain.  Section 3 - Lifting: I can lift heavy weights without causing extra pain  Section 4 - Work: I can only do my usual work, but no more  Section 5 - Headaches: I have no headaches at all.  Section 6 - Concentration: I can concentrate fully without difficulty.  Section 7 - Sleeping: My sleep is slightly disturbed for less than 1 hour.  Section 8 - Driving: I can drive my car without neck pain  Section 9 - Reading: I can read as much as I want with slight neck pain.  Section 10 - Recreation: I have some neck pain with all recreational activities.  Neck Disability Index Score: 5  Neck Disability Index Comments: 10%      Time Calculation:     Therapy Suggested Charges     Code   Minutes Charges    76275 (CPT®) Hc Pt Neuromusc Re Education Ea 15 Min      52702 (CPT®) Hc Pt Ther Proc Ea 15 Min 30 2    16884 (CPT®) Hc Gait Training Ea 15 Min      82271 (CPT®) Hc Pt Therapeutic Act Ea 15 Min      62992 (CPT®) Hc Pt Manual Therapy Ea 15 Min      48516 (CPT®) Hc Pt Ther Massage- Per 15 Min      89059 (CPT®) Hc Pt Iontophoresis Ea 15 Min      79082 (CPT®) Hc Pt Elec Stim Ea-Per 15 Min      98010 (CPT®) Hc Pt Ultrasound Ea 15 Min      40638 (CPT®) Hc Pt Self Care/Mgmt/Train Ea 15 Min      Total  30 2          Start Time: 0835         PT G-Codes  Outcome Measure Options: Quick DASH, Neck Disability Index (NDI)         Shabnam Garcia, PT  6/13/2018

## 2018-06-13 NOTE — THERAPY PROGRESS REPORT/RE-CERT
Outpatient Physical Therapy Ortho Re-Assessment   Tom     Patient Name: Ana Alaniz  : 1955  MRN: [id number]  Today's Date: 2018      Visit Date: 2018    Patient Active Problem List   Diagnosis   • Severe obstructive sleep apnea   • Hypertension   • Degenerative joint disease   • Chronic rhinitis   • Hyperlipidemia   • Gastroesophageal reflux disease        Past Medical History:   Diagnosis Date   • Acute pharyngitis    • Allergy    • Arrhythmia    • Degenerative arthritis    • Degenerative joint disease 2016   • Epistaxis    • Glaucoma    • Hypertension    • Positive KENY (antinuclear antibody)    • Right foot pain    • Sinusitis    • Sleep apnea    • Transfusion history    • URI (upper respiratory infection)         Past Surgical History:   Procedure Laterality Date   • APPENDECTOMY     • CATARACT EXTRACTION, BILATERAL     • OTHER SURGICAL HISTORY      LT Knee scar excision   • REPLACEMENT TOTAL KNEE Right    • TOTAL KNEE ARTHROPLASTY Left        Visit Dx:     ICD-10-CM ICD-9-CM   1. DDD (degenerative disc disease), cervical M50.30 722.4             Patient History     Row Name 18 0800             Pain     Pain at Present 2  -SERJIO      Pain at Best 1  -SERJIO      Pain at Worst 4  -SERJIO        User Key  (r) = Recorded By, (t) = Taken By, (c) = Cosigned By    Initials Name Provider Type    SERJIO Garcia PT Physical Therapist                PT Ortho     Row Name 18 0800       Head/Neck/Trunk    Neck Extension AROM 74   no pain  -SERJIO    Neck Flexion AROM 46   no pain  -SERJIO    Neck Lt Lateral Flexion AROM 35  -SERJIO    Neck Rt Lateral Flexion AROM 35  -SERJIO    Neck Lt Rotation AROM 73  -SERJIO    Neck Rt Rotation AROM 72  -SERJIO      User Key  (r) = Recorded By, (t) = Taken By, (c) = Cosigned By    Initials Name Provider Type    SERJIO Garcia PT Physical Therapist                      Therapy Education  Given: HEP  Program: Progressed  How Provided: Verbal, Demonstration,  Written  Provided to: Patient  Level of Understanding: Demonstrated, Verbalized           PT OP Goals     Row Name 06/13/18 0800          PT Short Term Goals    STG Date to Achieve 06/06/18  -     STG 1 Pt. demonstrates independence in initial HEP.  -     STG 1 Progress Met  -     STG 2 Pt. reports reduction in frequency, intensity, or distribution of symptoms.  -     STG 2 Progress Met  -     STG 3 Neural tension symptoms show improvement compared to baseline measures.  -     STG 4 Pt. demonstrates improving awareness of correct posture.  -     STG 4 Progress Met  -        Long Term Goals    LTG Date to Achieve 07/04/18  -     LTG 1 Pt. is independent in self-management of any remaining symptoms.  -     LTG 1 Progress Progressing  -SERJIO     LTG 2 Symptoms are intermittent, mild, and do not interfere with daily activities.  -     LTG 2 Progress Progressing  -     LTG 3 Cervical ROM and neural pliability are WNL's without increase in pain.  -     LTG 3 Progress Progressing  -       User Key  (r) = Recorded By, (t) = Taken By, (c) = Cosigned By    Initials Name Provider Type    SERJIO Garcia, PT Physical Therapist                PT Assessment/Plan     Row Name 06/13/18 1441 06/13/18 1400       PT Assessment    Assessment Comments  -- Pt. is learning to self-manage symptoms and notices improvement with exercise performance.  -SERJIO    Please refer to paper survey for additional self-reported information Yes  -SERJIO  --    Rehab Potential Good  -SERJIO  --    Patient/caregiver participated in establishment of treatment plan and goals Yes  -SERJIO  --    Patient would benefit from skilled therapy intervention Yes  -SERJIO  --       PT Plan    PT Frequency 1x/week  -SERJIO  --    Predicted Duration of Therapy Intervention (Therapy Eval) up to 4 visits if needed  -SERJIO  --    Planned CPT's? PT EVAL MOD COMPLELITY: 02754;PT THER PROC EA 15 MIN: 19302;PT MANUAL THERAPY EA 15 MIN: 48859;PT NEUROMUSC RE-EDUCATION EA  15 MIN: 02954;PT TRACTION CERVICAL: 94512;PT HOT OR COLD PACK TREAT MCARE;PT ULTRASOUND EA 15 MIN: 03599;PT ELECTRICAL STIM UNATTEND:   -SERJIO  --    PT Plan Comments  -- Pt. will consider whether she feels ready to self-manage symptoms with HEP vs. continuing formal PT intervention.  She will cancel appointments if she continues to do well with HEP.  -SERJIO      User Key  (r) = Recorded By, (t) = Taken By, (c) = Cosigned By    Initials Name Provider Type    SERJIO Garcia PT Physical Therapist                  Exercises     Row Name 06/13/18 1442 06/13/18 0800          Subjective Comments    Subjective Comments  -- Pt. reports stiffness this morning.  It is early in the day, but overall, pt. notices improvement and she can tell that exercises help when she does them.  -SERJIO        Total Minutes    97369 - PT Therapeutic Exercise Minutes 30  -SERJIO  --        Exercise 1    Exercise Name 1  -- Reassessment completed and HEP progressed today to include neural gliding and scapular mobility exercises.  -SERJIO     Time 1  -- 30  -SERJIO       User Key  (r) = Recorded By, (t) = Taken By, (c) = Cosigned By    Initials Name Provider Type    SERJIO Garcia, SILVIA Physical Therapist                        Outcome Measure Options: Quick DASH, Neck Disability Index (NDI)  Quick DASH  Open a tight or new jar.: Mild Difficulty  Do heavy household chores (e.g., wash walls, wash floors): Mild Difficulty  Carry a shopping bag or briefcase: No Difficulty  Wash your back: Mild Difficulty  Use a knife to cut food: No Difficulty  During the past week, to what extent has your arm, shoulder, or hand problem interfered with your normal social activites with family, friends, neighbors or groups?: Not at all  During the past week, were you limited in your work or other regular daily activities as a result of your arm, shoulder or hand problem?: Not limited at all  Arm, Shoulder, or hand pain: Mild  Tingling (pins and needles) in your arm,  shoulder, or hand: Mild  During the past week, how much difficulty have you had sleeping because of the pain in your arm, shoulder or hand?: No difficulty  Number of Questions Answered: 10  Quick DASH Score: 12.5  Neck Disability Index  Section 1 - Pain Intensity: The pain is very mild at the moment.  Section 2 - Personal Care: I can look after myself normally without causing extra pain.  Section 3 - Lifting: I can lift heavy weights without causing extra pain  Section 4 - Work: I can only do my usual work, but no more  Section 5 - Headaches: I have no headaches at all.  Section 6 - Concentration: I can concentrate fully without difficulty.  Section 7 - Sleeping: My sleep is slightly disturbed for less than 1 hour.  Section 8 - Driving: I can drive my car without neck pain  Section 9 - Reading: I can read as much as I want with slight neck pain.  Section 10 - Recreation: I have some neck pain with all recreational activities.  Neck Disability Index Score: 5  Neck Disability Index Comments: 10%      Time Calculation:     Therapy Suggested Charges     Code   Minutes Charges    29939 (CPT®) Hc Pt Neuromusc Re Education Ea 15 Min      30677 (CPT®) Hc Pt Ther Proc Ea 15 Min 30 2    66468 (CPT®) Hc Gait Training Ea 15 Min      13949 (CPT®) Hc Pt Therapeutic Act Ea 15 Min      07344 (CPT®) Hc Pt Manual Therapy Ea 15 Min      33668 (CPT®) Hc Pt Ther Massage- Per 15 Min      51494 (CPT®) Hc Pt Iontophoresis Ea 15 Min      25882 (CPT®) Hc Pt Elec Stim Ea-Per 15 Min      40777 (CPT®) Hc Pt Ultrasound Ea 15 Min      46525 (CPT®) Hc Pt Self Care/Mgmt/Train Ea 15 Min      Total  30 2          Start Time: 0835     Therapy Charges for Today     Code Description Service Date Service Provider Modifiers Qty    64515099930 HC PT CARRY MOV HAND OBJ CURRENT 6/13/2018 Shabnam Garcia, PT GP, CI 1    25244677389 HC PT CARRY MOV HAND OBJ PROJECTED 6/13/2018 Shabnam Garcia, PT GP, CI 1          PT G-Codes  PT Professional Judgement  Used?: Yes  Outcome Measure Options: Quick DASH, Neck Disability Index (NDI)  Score: Quick DASH 12.5, NDI 10%  Functional Limitation: Carrying, moving and handling objects  Carrying, Moving and Handling Objects Current Status (): At least 1 percent but less than 20 percent impaired, limited or restricted  Carrying, Moving and Handling Objects Goal Status (): At least 1 percent but less than 20 percent impaired, limited or restricted         Shabnam Garcia, PT  6/13/2018

## 2018-06-18 ENCOUNTER — DOCUMENTATION (OUTPATIENT)
Dept: PHYSICAL THERAPY | Facility: HOSPITAL | Age: 63
End: 2018-06-18

## 2018-06-18 DIAGNOSIS — M50.30 DDD (DEGENERATIVE DISC DISEASE), CERVICAL: Primary | ICD-10-CM

## 2018-06-18 PROCEDURE — G8986 CARRY D/C STATUS: HCPCS | Performed by: PHYSICAL THERAPIST

## 2018-06-18 PROCEDURE — G8985 CARRY GOAL STATUS: HCPCS | Performed by: PHYSICAL THERAPIST

## 2018-06-18 PROCEDURE — G8984 CARRY CURRENT STATUS: HCPCS | Performed by: PHYSICAL THERAPIST

## 2018-06-18 NOTE — THERAPY DISCHARGE NOTE
Outpatient Physical Therapy Discharge Summary         Patient Name: Ana Alaniz  : 1955  MRN: [id number]    Today's Date: 2018    Visit Dx:    ICD-10-CM ICD-9-CM   1. DDD (degenerative disc disease), cervical M50.30 722.4             PT OP Goals     Row Name 18 0800          PT Short Term Goals    STG Date to Achieve 18  -SERJIO     STG 1 Pt. demonstrates independence in initial HEP.  -SERJIO     STG 1 Progress Met  -SERJIO     STG 2 Pt. reports reduction in frequency, intensity, or distribution of symptoms.  -SERJIO     STG 2 Progress Met  -SERJIO     STG 3 Neural tension symptoms show improvement compared to baseline measures.  -SERJIO     STG 4 Pt. demonstrates improving awareness of correct posture.  -SERJIO     STG 4 Progress Met  -SERJIO        Long Term Goals    LTG Date to Achieve 18  -SERJIO     LTG 1 Pt. is independent in self-management of any remaining symptoms.  -SERJIO     LTG 1 Progress Progressing  -SERJIO     LTG 2 Symptoms are intermittent, mild, and do not interfere with daily activities.  -SERJIO     LTG 2 Progress Progressing  -SERJIO     LTG 3 Cervical ROM and neural pliability are WNL's without increase in pain.  -SERJIO     LTG 3 Progress Progressing  -SERJIO       User Key  (r) = Recorded By, (t) = Taken By, (c) = Cosigned By    Initials Name Provider Type    SERJIO Garcia, PT Physical Therapist          OP PT Discharge Summary  Date of Discharge: 18  Reason for Discharge: Independent, Patient/Caregiver request  Outcomes Achieved: Patient able to partially acheive established goals  Discharge Destination: Home with home program      Time Calculation:        Therapy Suggested Charges     Code   Minutes Charges    None               PT G-Codes  Functional Limitation: Carrying, moving and handling objects  Carrying, Moving and Handling Objects Current Status (): At least 1 percent but less than 20 percent impaired, limited or restricted  Carrying, Moving and Handling Objects Goal Status (): At  least 1 percent but less than 20 percent impaired, limited or restricted  Carrying, Moving and Handling Objects Discharge Status (): At least 1 percent but less than 20 percent impaired, limited or restricted       Shabnam Garcia, PT  6/18/2018

## 2018-08-10 ENCOUNTER — TELEPHONE (OUTPATIENT)
Dept: FAMILY MEDICINE CLINIC | Facility: CLINIC | Age: 63
End: 2018-08-10

## 2018-08-10 NOTE — TELEPHONE ENCOUNTER
I contacted the patient to see if she would like to schedule Welcome to [employer] Wellness Visit.  Pt scheduled with Dr. Bernal 8/14/18 9:30am.

## 2018-08-14 ENCOUNTER — OFFICE VISIT (OUTPATIENT)
Dept: FAMILY MEDICINE CLINIC | Facility: CLINIC | Age: 63
End: 2018-08-14

## 2018-08-14 VITALS
TEMPERATURE: 98.1 F | RESPIRATION RATE: 18 BRPM | WEIGHT: 179 LBS | HEART RATE: 83 BPM | SYSTOLIC BLOOD PRESSURE: 116 MMHG | DIASTOLIC BLOOD PRESSURE: 74 MMHG | BODY MASS INDEX: 30.56 KG/M2 | OXYGEN SATURATION: 97 % | HEIGHT: 64 IN

## 2018-08-14 DIAGNOSIS — M48.52XA COLLAPSED VERTEBRA, NOT ELSEWHERE CLASSIFIED, CERVICAL REGION, INITIAL ENCOUNTER FOR FRACTURE (HCC): ICD-10-CM

## 2018-08-14 DIAGNOSIS — Z12.11 SCREENING FOR COLON CANCER: ICD-10-CM

## 2018-08-14 DIAGNOSIS — Z00.00 MEDICARE WELCOME VISIT: ICD-10-CM

## 2018-08-14 DIAGNOSIS — Z13.820 SCREENING FOR OSTEOPOROSIS: Primary | ICD-10-CM

## 2018-08-14 PROCEDURE — G0402 INITIAL PREVENTIVE EXAM: HCPCS | Performed by: FAMILY MEDICINE

## 2018-08-14 PROCEDURE — G0403 EKG FOR INITIAL PREVENT EXAM: HCPCS | Performed by: FAMILY MEDICINE

## 2018-08-14 NOTE — PROGRESS NOTES
QUICK REFERENCE INFORMATION:  The ABCs of the Annual Wellness Visit    [place] to Medicare Visit    HEALTH RISK ASSESSMENT    1955    Recent Hospitalizations:  No hospitalization(s) within the last year..      Current Medical Providers:  Dr Bernal- PCP  Dr Killian- Cardio   [place], Wilbert GRANDE-GYN  Josué, Amos Gar-Opthamologist  Dr Clinton- Dentist      Smoking Status:  History   Smoking Status   • Never Smoker   Smokeless Tobacco   • Never Used       Alcohol Consumption:  History   Alcohol Use   • 1.2 - 2.4 oz/week   • 1 - 2 Glasses of wine, 1 - 2 Cans of beer per week     Comment: nightly       Depression Screen:   PHQ-2/PHQ-9 Depression Screening 8/14/2018   Little interest or pleasure in doing things 0   Feeling down, depressed, or hopeless 0   Total Score 0       Health Habits and Functional and Cognitive Screening:  Functional & Cognitive Status 8/14/2018   Do you have difficulty preparing food and eating? No   Do you have difficulty bathing yourself, getting dressed or grooming yourself? No   Do you have difficulty using the toilet? No   Do you have difficulty moving around from place to place? No   Do you have trouble with steps or getting out of a bed or a chair? No   In the past year have you fallen or experienced a near fall? No   Current Diet Well Balanced Diet   Dental Exam Up to date   Eye Exam Up to date   Exercise (times per week) 3 times per week   Current Exercise Activities Include Cardiovasular Workout on Exercise Equipment   Do you need help using the phone?  No   Are you deaf or do you have serious difficulty hearing?  No   Do you need help with transportation? No   Do you need help shopping? No   Do you need help preparing meals?  No   Do you need help with housework?  No   Do you need help with laundry? No   Do you need help taking your medications? No   Do you need help managing money? No   Do you ever drive or ride in a car without wearing a seat belt? No   Have you felt unusual  stress, anger or loneliness in the last month? No   Who do you live with? Spouse   If you need help, do you have trouble finding someone available to you? No   Have you been bothered in the last four weeks by sexual problems? No   Do you have difficulty concentrating, remembering or making decisions? No           Does the patient have evidence of cognitive impairment? No    Mini cog test administered. Remembered Jonathan Santiago, [address] [facility] after naming 20 animals.          Aspirin use counseling? Does not need ASA (and currently is not on it)      Recent Lab Results:  CMP:  Lab Results   Component Value Date    BUN 16 04/16/2018    CREATININE 0.70 04/16/2018    EGFRIFNONA 84 04/16/2018    BCR 22.9 04/16/2018     04/16/2018    K 3.6 04/16/2018    CO2 31.0 04/16/2018    CALCIUM 10.1 04/16/2018    ALBUMIN 5.20 (H) 04/16/2018    BILITOT 1.0 04/16/2018    ALKPHOS 77 04/16/2018    AST 28 04/16/2018    ALT 25 04/16/2018     Lipid Panel:  Lab Results   Component Value Date    CHOL 242 (H) 04/16/2018    TRIG 100 04/16/2018    HDL 96 (H) 04/16/2018     HbA1c:  Lab Results   Component Value Date    HGBA1C 5.5 07/16/2015       Visual Acuity:   Visual Acuity Screening    Right eye Left eye Both eyes   Without correction: 20/20 20/20 20/20   With correction:          Age-appropriate Screening Schedule:  Refer to the list below for future screening recommendations based on patient's age, sex and/or medical conditions. Orders for these recommended tests are listed in the plan section. The patient has been provided with a written plan.    Health Maintenance   Topic Date Due   • ZOSTER VACCINE (2 of 3) 02/07/2014   • MAMMOGRAM  01/16/2017   • PAP SMEAR  01/16/2017   • COLONOSCOPY  01/16/2017   • INFLUENZA VACCINE  08/01/2018   • PNEUMOCOCCAL VACCINES (65+ LOW/MEDIUM RISK) (2 of 2 - PPSV23) 04/16/2019   • LIPID PANEL  04/16/2019   • TDAP/TD VACCINES  Excluded        Subjective   History of Present Illness    Ana FLORES  Corbin is a 63 y.o. female an established patient presenting for a Welcome to Medicare Visit.     The following portions of the patient's history were reviewed and updated as appropriate: allergies, current medications, past family history, past medical history, past social history, past surgical history and problem list.    Outpatient Medications Prior to Visit   Medication Sig Dispense Refill   • amLODIPine (NORVASC) 5 MG tablet Take 1 tablet by mouth Daily. 90 tablet 3   • atorvastatin (LIPITOR) 10 MG tablet Take 1 tablet by mouth Daily. 90 tablet 3   • fexofenadine (ALLEGRA) 180 MG tablet Take 180 mg by mouth Daily.     • Flaxseed, Linseed, (FLAX SEED OIL) 1300 MG capsule Take 1 tablet by mouth Daily.     • Glucosamine-Chondroit-Vit C-Mn (GLUCOSAMINE CHONDR 1500 COMPLX) capsule Take 1,500 mg by mouth 2 (Two) Times a Day.     • hydrochlorothiazide (HYDRODIURIL) 25 MG tablet Take 1 tablet by mouth Daily. 90 tablet 3   • losartan (COZAAR) 50 MG tablet Take 1 tablet by mouth Daily. 90 tablet 3   • Lutein-Zeaxanthin 25-5 MG capsule Take 1 tablet by mouth Daily.     • Multiple Vitamin (MULTI VITAMIN) tablet Take 1 tablet by mouth Daily.     • Multiple Vitamins-Minerals (PRESERVISION AREDS 2 PO) Take  by mouth Daily.     • nitroglycerin (NITROSTAT) 0.4 MG SL tablet Place 1 tablet under the tongue Every 5 (Five) Minutes As Needed for Chest Pain. Take no more than 3 doses in 15 minutes. 20 tablet 0   • Omega-3 Fatty Acids (OMEGA-3 FISH OIL) 500 MG capsule Take 500 mg by mouth Daily.     • omeprazole (priLOSEC) 40 MG capsule Take 1 capsule by mouth Daily. 90 capsule 3     No facility-administered medications prior to visit.        Patient Active Problem List   Diagnosis   • Severe obstructive sleep apnea   • Hypertension   • Degenerative joint disease   • Chronic rhinitis   • Hyperlipidemia   • Gastroesophageal reflux disease   [facility]       Advance Care Planning:  has NO advance directive - information  "provided to the patient today    Identification of Risk Factors:  Risk factors include: weight , cardiovascular risk and chronic pain.    Review of Systems   Constitutional: Negative.    Eyes: Negative.    Respiratory: Negative.    Cardiovascular: Negative.    Gastrointestinal: Negative.    Endocrine: Negative.    Genitourinary: Negative.    Musculoskeletal: Positive for arthralgias and back pain.   Skin: Negative.    Allergic/Immunologic: Positive for environmental allergies.   Neurological: Positive for headaches.   Hematological: Negative.    Psychiatric/Behavioral: Positive for sleep disturbance.       Compared to one year ago, the patient feels her physical health is better.  Compared to one year ago, the patient feels her mental health is the same.    Objective    Physical Exam   Constitutional: She appears well-developed and well-nourished.   Eyes: Conjunctivae and lids are normal.   Neck: Normal range of motion.   Cardiovascular: Normal rate, regular rhythm and normal heart sounds.    Pulmonary/Chest: Effort normal.   Abdominal: Normal appearance and bowel sounds are normal.   Musculoskeletal: Normal range of motion.   Skin: Skin is warm.   Nursing note and vitals reviewed.      Vitals:    08/14/18 0955   BP: 116/74   Pulse: 83   Resp: 18   Temp: 98.1 °F (36.7 °C)   SpO2: 97%   Weight: 81.2 kg (179 lb)   Height: 162.6 cm (64\")   PainSc:   2       Patient's Body mass index is 30.73 kg/m². BMI is above normal parameters. Recommendations include: nutrition counseling.      Procedure     ECG 12 Lead  Date/Time: 8/15/2018 8:12 AM  Performed by: MARTHA SHERMAN  Authorized by: MARTHA SHERMAN   Comparison: compared with previous ECG from 3/30/2017  Similar to previous ECG  Rhythm: sinus rhythm  Rate: normal  BPM: 75  Conduction: right bundle branch block  ST Segments: ST segments normal  T depression: III, V3, V4 and V5  QRS axis: normal  Clinical impression: abnormal ECG                Assessment/Plan   Patient " Self-Management and Personalized Health Advice  The patient has been provided with information about: diet, exercise, weight management, fall prevention, designing advance directives and supplements and preventive services including:   · Advance directive.    Visit Diagnoses:    ICD-10-CM ICD-9-CM   1. Screening for osteoporosis Z13.820 V82.81   2. Medicare welcome visit Z00.00 V70.0   3. Screening for colon cancer Z12.11 V76.51   4. Collapsed vertebra, not elsewhere classified, cervical region, initial encounter for fracture (CMS/[facility])  M48.52XA 733.13       Orders Placed This Encounter   Procedures   • DEXA Bone Density Axial     Order Specific Question:   Reason for Exam:     Answer:   Screening for Osteoporosis   • Ambulatory Referral For Screening Colonoscopy     Referral Priority:   Routine     Referral Type:   Diagnostic Medical     Referral Reason:   Specialty Services Required     Referred to Provider:   Miguel Navas MD     Requested Specialty:   Colon and Rectal Surgery     Number of Visits Requested:   1   • ECG 12 Lead     Order Specific Question:   Reason for Exam:     Answer:   Welcome to Medicare Visit       Outpatient Encounter Prescriptions as of 8/14/2018   Medication Sig Dispense Refill   • amLODIPine (NORVASC) 5 MG tablet Take 1 tablet by mouth Daily. 90 tablet 3   • atorvastatin (LIPITOR) 10 MG tablet Take 1 tablet by mouth Daily. 90 tablet 3   • fexofenadine (ALLEGRA) 180 MG tablet Take 180 mg by mouth Daily.     • Flaxseed, Linseed, (FLAX SEED OIL) 1300 MG capsule Take 1 tablet by mouth Daily.     • Glucosamine-Chondroit-Vit C-Mn (GLUCOSAMINE CHONDR 1500 COMPLX) capsule Take 1,500 mg by mouth 2 (Two) Times a Day.     • hydrochlorothiazide (HYDRODIURIL) 25 MG tablet Take 1 tablet by mouth Daily. 90 tablet 3   • losartan (COZAAR) 50 MG tablet Take 1 tablet by mouth Daily. 90 tablet 3   • Lutein-Zeaxanthin 25-5 MG capsule Take 1 tablet by mouth Daily.     • Multiple Vitamin (MULTI VITAMIN)  tablet Take 1 tablet by mouth Daily.     • Multiple Vitamins-Minerals (PRESERVISION AREDS 2 PO) Take  by mouth Daily.     • nitroglycerin (NITROSTAT) 0.4 MG SL tablet Place 1 tablet under the tongue Every 5 (Five) Minutes As Needed for Chest Pain. Take no more than 3 doses in 15 minutes. 20 tablet 0   • Omega-3 Fatty Acids (OMEGA-3 FISH OIL) 500 MG capsule Take 500 mg by mouth Daily.     • omeprazole (priLOSEC) 40 MG capsule Take 1 capsule by mouth Daily. 90 capsule 3     No facility-administered encounter medications on file as of 8/14/2018.        Reviewed use of high risk medication in the elderly: yes  Reviewed for potential of harmful drug interactions in the elderly: yes    Follow Up:    Screening colonoscopy ordered with [facility].  Bone Density exam ordered.  Patient states she will set up mammogram herself     Drink plenty fluids.  Discussed diet and exercise    Continue medications as doing.    UTD on all immunizations.  Patient directed to pharmacy for Shingrix shot    Follow up in 8 months.      An After Visit Summary and PPPS with all of these plans were given to the patient.      Scribed for Dr Tom Bernal by Jacklyn Rodriguez CMA.          I, Tom Bernal MD, personally performed the services described in this documentation, as scribed by Nereyda Schwartz in my presence, and is both accurate and complete.

## 2018-08-16 ENCOUNTER — HOSPITAL ENCOUNTER (OUTPATIENT)
Dept: BONE DENSITY | Facility: HOSPITAL | Age: 63
Discharge: HOME | End: 2018-08-16
Attending: FAMILY MEDICINE | Admitting: FAMILY MEDICINE

## 2018-08-16 PROCEDURE — 77080 DXA BONE DENSITY AXIAL: CPT

## 2018-10-27 ENCOUNTER — APPOINTMENT (OUTPATIENT)
Dept: CT IMAGING | Facility: HOSPITAL | Age: 63
End: 2018-10-27

## 2018-10-27 ENCOUNTER — APPOINTMENT (OUTPATIENT)
Dept: GENERAL RADIOLOGY | Facility: HOSPITAL | Age: 63
End: 2018-10-27

## 2018-10-27 ENCOUNTER — HOSPITAL ENCOUNTER (EMERGENCY)
Facility: HOSPITAL | Age: 63
Discharge: HOME | End: 2018-10-27
Attending: EMERGENCY MEDICINE | Admitting: EMERGENCY MEDICINE

## 2018-10-27 VITALS
DIASTOLIC BLOOD PRESSURE: 95 MMHG | OXYGEN SATURATION: 97 % | HEART RATE: 82 BPM | RESPIRATION RATE: 15 BRPM | SYSTOLIC BLOOD PRESSURE: 130 MMHG | TEMPERATURE: 98.5 F | HEIGHT: 64 IN | BODY MASS INDEX: 29.53 KG/M2 | WEIGHT: 173 LBS

## 2018-10-27 DIAGNOSIS — G44.319 ACUTE POST-TRAUMATIC HEADACHE, NOT INTRACTABLE: ICD-10-CM

## 2018-10-27 DIAGNOSIS — F07.81 POST CONCUSSION SYNDROME: Primary | ICD-10-CM

## 2018-10-27 DIAGNOSIS — R11.0 NAUSEA: ICD-10-CM

## 2018-10-27 DIAGNOSIS — S00.03XA CONTUSION OF SCALP, INITIAL ENCOUNTER: ICD-10-CM

## 2018-10-27 PROCEDURE — 99284 EMERGENCY DEPT VISIT MOD MDM: CPT

## 2018-10-27 PROCEDURE — 72072 X-RAY EXAM THORAC SPINE 3VWS: CPT

## 2018-10-27 PROCEDURE — 70450 CT HEAD/BRAIN W/O DYE: CPT

## 2018-10-27 PROCEDURE — 72125 CT NECK SPINE W/O DYE: CPT

## 2018-10-27 PROCEDURE — 71101 X-RAY EXAM UNILAT RIBS/CHEST: CPT

## 2018-10-27 RX ORDER — ONDANSETRON 4 MG/1
4 TABLET, FILM COATED ORAL EVERY 8 HOURS PRN
Qty: 12 TABLET | Refills: 0 | Status: SHIPPED | OUTPATIENT
Start: 2018-10-27 | End: 2019-07-16

## 2018-10-27 RX ORDER — BUTALBITAL, ACETAMINOPHEN AND CAFFEINE 50; 325; 40 MG/1; MG/1; MG/1
1 TABLET ORAL EVERY 6 HOURS PRN
Qty: 12 TABLET | Refills: 0 | Status: SHIPPED | OUTPATIENT
Start: 2018-10-27 | End: 2019-07-16

## 2018-10-27 RX ORDER — HYDROCODONE BITARTRATE AND ACETAMINOPHEN 5; 325 MG/1; MG/1
1 TABLET ORAL ONCE
Status: COMPLETED | OUTPATIENT
Start: 2018-10-27 | End: 2018-10-27

## 2018-10-27 RX ORDER — ONDANSETRON 2 MG/ML
4 INJECTION INTRAMUSCULAR; INTRAVENOUS ONCE
Status: DISCONTINUED | OUTPATIENT
Start: 2018-10-27 | End: 2018-10-27

## 2018-10-27 RX ORDER — ONDANSETRON 4 MG/1
4 TABLET, ORALLY DISINTEGRATING ORAL ONCE
Status: COMPLETED | OUTPATIENT
Start: 2018-10-27 | End: 2018-10-27

## 2018-10-27 RX ADMIN — HYDROCODONE BITARTRATE AND ACETAMINOPHEN 1 TABLET: 5; 325 TABLET ORAL at 13:32

## 2018-10-27 RX ADMIN — ONDANSETRON 4 MG: 4 TABLET, ORALLY DISINTEGRATING ORAL at 13:32

## 2018-11-02 ENCOUNTER — OFFICE VISIT (OUTPATIENT)
Dept: FAMILY MEDICINE CLINIC | Facility: CLINIC | Age: 63
End: 2018-11-02

## 2018-11-02 VITALS
WEIGHT: 173.2 LBS | DIASTOLIC BLOOD PRESSURE: 78 MMHG | HEART RATE: 89 BPM | TEMPERATURE: 97.9 F | SYSTOLIC BLOOD PRESSURE: 120 MMHG | BODY MASS INDEX: 29.57 KG/M2 | RESPIRATION RATE: 20 BRPM | HEIGHT: 64 IN | OXYGEN SATURATION: 97 %

## 2018-11-02 DIAGNOSIS — S00.03XD CONTUSION OF SCALP, SUBSEQUENT ENCOUNTER: ICD-10-CM

## 2018-11-02 DIAGNOSIS — S06.0X9D CONCUSSION WITH LOSS OF CONSCIOUSNESS, SUBSEQUENT ENCOUNTER: Primary | ICD-10-CM

## 2018-11-02 DIAGNOSIS — M19.031 PRIMARY OSTEOARTHRITIS OF RIGHT WRIST: ICD-10-CM

## 2018-11-02 DIAGNOSIS — Z23 NEED FOR IMMUNIZATION AGAINST INFLUENZA: ICD-10-CM

## 2018-11-02 PROCEDURE — G0008 ADMIN INFLUENZA VIRUS VAC: HCPCS | Performed by: FAMILY MEDICINE

## 2018-11-02 PROCEDURE — 99214 OFFICE O/P EST MOD 30 MIN: CPT | Performed by: FAMILY MEDICINE

## 2018-11-02 PROCEDURE — 90662 IIV NO PRSV INCREASED AG IM: CPT | Performed by: FAMILY MEDICINE

## 2018-11-02 NOTE — PROGRESS NOTES
"Subjective   Ana Alaniz is a 63 y.o. female seen today for Concussion.     History of Present Illness   The patient is here today to follow up from [facility] ER due to a fall.    States on 10/24 she tripped over some supplies in the floor and fell into a door frame hitting her head and right shoulder.  Believes that she lost consciousness for a while. She does not remember hitting her head but does remember waking up in the floor, and after awakening she vomited.  She attempted to stand up, became dizzy, and fell once again landing onto her right side in the bathtub.  Denies head injury during second fall and remembers the incident.   Went to the ER at [facility] on 10/27.  She she is doing fairly well. Has had a headache since the fall. Feels like a band around her head.  Denies any dizziness,chest pain or shortness of breath.  Eating and drinking well. Had some nausea at first but has resolved.  States she has a mild since of instability.    BP is 120/78. Heart rate 89.    Also c/o some soreness in the right thumb and radiates into the right wrist x 1-2 months. Pain is intermittent. Worse with use and palpation.    The following portions of the patient's history were reviewed and updated as appropriate: allergies, current medications, past social history and problem list.    Review of Systems   Respiratory: Negative for shortness of breath.    Cardiovascular: Negative for chest pain.   Musculoskeletal: Positive for arthralgias (right shoulder).   Skin:        Bruising of the right temple, face and shoulder.     Neurological: Positive for headaches.       Objective   /78   Pulse 89   Temp 97.9 °F (36.6 °C) (Temporal Artery )   Resp 20   Ht 162.6 cm (64\")   Wt 78.6 kg (173 lb 3.2 oz)   SpO2 97%   BMI 29.73 kg/m²   Physical Exam   Constitutional: She is oriented to person, place, and time. She appears well-developed and well-nourished.   HENT:   Right Ear: External ear normal.   Left Ear: " External ear normal.   Nose: Nose normal.   Mouth/Throat: Oropharynx is clear and moist.   There is some bruising and tenderness over the right side of the 4 head and scalp.   Eyes: Pupils are equal, round, and reactive to light.   Neck: Normal range of motion. Neck supple.   Cardiovascular: Normal rate, regular rhythm and normal heart sounds.    No murmur heard.  Pulmonary/Chest: Effort normal and breath sounds normal. She has no wheezes. She has no rales.   Musculoskeletal: She exhibits tenderness (right temple/fore head.).   Neurological: She is alert and oriented to person, place, and time. She displays normal reflexes. No cranial nerve deficit or sensory deficit. She exhibits normal muscle tone. Coordination normal.   Psychiatric: She has a normal mood and affect.   Nursing note and vitals reviewed.      Assessment/Plan   Problem List Items Addressed This Visit        Musculoskeletal and Integument    Degenerative joint disease      Other Visit Diagnoses     Concussion with loss of consciousness, subsequent encounter    -  Primary    Need for immunization against influenza        Contusion of scalp, subsequent encounter                  Drink plenty fluids.    Continue medications as doing.    OK to resume normal activity as tolerated.    Use heat therapy for the wrist pain.    Given a high dose flu vaccine.    Follow up as needed.  The history is most consistent with a fall with subsequent head trauma and concussion.  There is no history to support  syncope prior to head trauma.                  Scribed for Dr Tom Bernal by Nereyda Schwartz CMA.          I, Tom Bernal MD, personally performed the services described in this documentation, as scribed by Nereyda Schwartz in my presence, and is both accurate and complete.

## 2019-03-12 DIAGNOSIS — I10 ESSENTIAL HYPERTENSION: ICD-10-CM

## 2019-03-12 DIAGNOSIS — K21.9 GASTROESOPHAGEAL REFLUX DISEASE WITHOUT ESOPHAGITIS: ICD-10-CM

## 2019-03-12 DIAGNOSIS — E78.2 MIXED HYPERLIPIDEMIA: ICD-10-CM

## 2019-03-13 RX ORDER — HYDROCHLOROTHIAZIDE 25 MG/1
25 TABLET ORAL DAILY
Qty: 90 TABLET | Refills: 3 | Status: SHIPPED | OUTPATIENT
Start: 2019-03-13 | End: 2020-03-12 | Stop reason: SDUPTHER

## 2019-03-13 RX ORDER — AMLODIPINE BESYLATE 5 MG/1
5 TABLET ORAL DAILY
Qty: 90 TABLET | Refills: 3 | Status: SHIPPED | OUTPATIENT
Start: 2019-03-13 | End: 2020-03-12 | Stop reason: SDUPTHER

## 2019-03-13 RX ORDER — LOSARTAN POTASSIUM 50 MG/1
50 TABLET ORAL DAILY
Qty: 90 TABLET | Refills: 3 | Status: SHIPPED | OUTPATIENT
Start: 2019-03-13 | End: 2020-03-12 | Stop reason: SDUPTHER

## 2019-03-13 RX ORDER — ATORVASTATIN CALCIUM 10 MG/1
10 TABLET, FILM COATED ORAL DAILY
Qty: 90 TABLET | Refills: 3 | Status: SHIPPED | OUTPATIENT
Start: 2019-03-13 | End: 2020-03-12 | Stop reason: SDUPTHER

## 2019-03-13 RX ORDER — OMEPRAZOLE 40 MG/1
40 CAPSULE, DELAYED RELEASE ORAL DAILY
Qty: 90 CAPSULE | Refills: 3 | Status: SHIPPED | OUTPATIENT
Start: 2019-03-13 | End: 2020-03-12 | Stop reason: SDUPTHER

## 2019-05-14 ENCOUNTER — OFFICE VISIT (OUTPATIENT)
Dept: SLEEP MEDICINE | Facility: HOSPITAL | Age: 64
End: 2019-05-14

## 2019-05-14 VITALS
DIASTOLIC BLOOD PRESSURE: 80 MMHG | WEIGHT: 187.6 LBS | SYSTOLIC BLOOD PRESSURE: 138 MMHG | HEIGHT: 64 IN | BODY MASS INDEX: 32.03 KG/M2 | OXYGEN SATURATION: 97 % | HEART RATE: 68 BPM

## 2019-05-14 DIAGNOSIS — G47.33 SEVERE OBSTRUCTIVE SLEEP APNEA: Primary | ICD-10-CM

## 2019-05-14 PROCEDURE — 99213 OFFICE O/P EST LOW 20 MIN: CPT | Performed by: NURSE PRACTITIONER

## 2019-05-14 NOTE — PROGRESS NOTES
Subjective:   Follow-up      Chief Complaint:   Chief Complaint   Patient presents with   • Follow-up       HPI:    Ana Alaniz is a 64 y.o. female here for follow-up of severe obstructive sleep apnea.  Patient was last seen 12/5/2017.  Patient has tried several different mask in the last 3 to 4 months and is still having problems with leaking.  Patient is being awakened by this in the night.  Patient put the strap cystitis she could but still had a leak.  Patient is sleeping 6 to 8 hours nightly and intermittently feels refreshed upon awakening.  Patient has an [place] score 4/24.  Patient has had her machine for approximately 5 years and would like an order for a new one.  She is also wondering if this may help correct her leak.  Than likely this is coming from her mask and may need to be refitted.  She states she does not have problems with the pressure but only the leak.        Current medications are:   Current Outpatient Medications:   •  amLODIPine (NORVASC) 5 MG tablet, Take 1 tablet by mouth Daily., Disp: 90 tablet, Rfl: 3  •  atorvastatin (LIPITOR) 10 MG tablet, Take 1 tablet by mouth Daily., Disp: 90 tablet, Rfl: 3  •  fexofenadine (ALLEGRA) 180 MG tablet, Take 180 mg by mouth Daily., Disp: , Rfl:   •  Flaxseed, Linseed, (FLAX SEED OIL) 1300 MG capsule, Take 1 tablet by mouth Daily., Disp: , Rfl:   •  Glucosamine-Chondroit-Vit C-Mn (GLUCOSAMINE CHONDR 1500 COMPLX) capsule, Take 1,500 mg by mouth 2 (Two) Times a Day., Disp: , Rfl:   •  hydrochlorothiazide (HYDRODIURIL) 25 MG tablet, Take 1 tablet by mouth Daily., Disp: 90 tablet, Rfl: 3  •  losartan (COZAAR) 50 MG tablet, Take 1 tablet by mouth Daily., Disp: 90 tablet, Rfl: 3  •  Lutein-Zeaxanthin 25-5 MG capsule, Take 1 tablet by mouth Daily., Disp: , Rfl:   •  Multiple Vitamin (MULTI VITAMIN) tablet, Take 1 tablet by mouth Daily., Disp: , Rfl:   •  Multiple Vitamins-Minerals (PRESERVISION AREDS 2 PO), Take  by mouth Daily., Disp: , Rfl:   •   nitroglycerin (NITROSTAT) 0.4 MG SL tablet, Place 1 tablet under the tongue Every 5 (Five) Minutes As Needed for Chest Pain. Take no more than 3 doses in 15 minutes., Disp: 20 tablet, Rfl: 0  •  Omega-3 Fatty Acids (OMEGA-3 FISH OIL) 500 MG capsule, Take 500 mg by mouth Daily., Disp: , Rfl:   •  omeprazole (priLOSEC) 40 MG capsule, Take 1 capsule by mouth Daily., Disp: 90 capsule, Rfl: 3  •  butalbital-acetaminophen-caffeine (FIORICET, ESGIC) -40 MG per tablet, Take 1 tablet by mouth Every 6 (Six) Hours As Needed for Headache., Disp: 12 tablet, Rfl: 0  •  ondansetron (ZOFRAN) 4 MG tablet, Take 1 tablet by mouth Every 8 (Eight) Hours As Needed for Nausea or Vomiting., Disp: 12 tablet, Rfl: 0.      The patient's relevant past medical, surgical, family and social history were reviewed and updated in Epic as appropriate.       Review of Systems   HENT: Positive for congestion and postnasal drip.    Respiratory: Positive for apnea.    Gastrointestinal:        Heartburn   Musculoskeletal: Positive for arthralgias and joint swelling.   Psychiatric/Behavioral: Positive for sleep disturbance.   All other systems reviewed and are negative.        Objective:    Physical Exam   Constitutional: She is oriented to person, place, and time. She appears well-developed and well-nourished.   HENT:   Head: Normocephalic and atraumatic.   Mouth/Throat: Oropharynx is clear and moist.   Mallampati 1 anatomy   Eyes: Conjunctivae are normal.   Neck: Neck supple.   Cardiovascular: Normal rate and regular rhythm.   Pulmonary/Chest: Effort normal and breath sounds normal.   Neurological: She is alert and oriented to person, place, and time.   Skin: Skin is warm and dry.   Psychiatric: She has a normal mood and affect. Her behavior is normal. Judgment and thought content normal.   Nursing note and vitals reviewed.    148/1 80 days of use.  Greater than 4-hour use 72.8%.  90% pressure 10.2.  AHI 3.7.  Download reviewed with  patient.    ASSESSMENT/PLAN    Ana was seen today for follow-up.    Diagnoses and all orders for this visit:    Severe obstructive sleep apnea  -     CPAP Therapy            1. Counseled patient regarding multimodal approach with healthy nutrition, healthy sleep, regular physical activity, social activities, counseling, and medications. Encouraged to practice lateral sleep position. Avoid alcohol and sedatives close to bedtime.  2.   Refill supplies x1 year.  Order for new machine faxed to patient's DME.  I will see patient back in 31 to 90 days.  I have reviewed the results of my evaluation and impression and discussed my recommendations in detail with the patient.      Signed by  Dia Ramey, APRN    May 14, 2019      CC: Tom Bernal MD          No ref. provider found

## 2019-05-16 NOTE — PROGRESS NOTES
[facility] at [facility]     Ana Alaniz  1955  [phone]    VISIT DATE:  5/17/2019     PCP: Tom Bernal MD  [id number]  [facility] [id number]    IDENTIFICATION: A 64 y.o. female from [place]  CC:  Chief Complaint   Patient presents with   • Hypertension       PROBLEM LIST:  1. CP  1. 5/18/17 MPS: Exercise Cardiolite WNL, EF 70%, exercise 6:30  2. HTN  3. HLD  1. Lipids 3/30/17:   238/67/99/131  2. ASCVD 10 year risk = 4.8%  3. 4/16/18 lipids:   HDL 96   4. LUKASZ- on CPAP since 2013  5. GERD  6. OA    Allergies  Allergies   Allergen Reactions   • Tetanus Toxoids Other (See Comments)     Unknown reaction   • Ace Inhibitors Cough       Current Medications    Current Outpatient Medications:   •  amLODIPine (NORVASC) 5 MG tablet, Take 1 tablet by mouth Daily., Disp: 90 tablet, Rfl: 3  •  atorvastatin (LIPITOR) 10 MG tablet, Take 1 tablet by mouth Daily., Disp: 90 tablet, Rfl: 3  •  fexofenadine (ALLEGRA) 180 MG tablet, Take 180 mg by mouth Daily., Disp: , Rfl:   •  Flaxseed, Linseed, (FLAX SEED OIL) 1300 MG capsule, Take 1 tablet by mouth Daily., Disp: , Rfl:   •  Glucosamine-Chondroit-Vit C-Mn (GLUCOSAMINE CHONDR 1500 COMPLX) capsule, Take 1,500 mg by mouth 2 (Two) Times a Day., Disp: , Rfl:   •  hydrochlorothiazide (HYDRODIURIL) 25 MG tablet, Take 1 tablet by mouth Daily., Disp: 90 tablet, Rfl: 3  •  losartan (COZAAR) 50 MG tablet, Take 1 tablet by mouth Daily., Disp: 90 tablet, Rfl: 3  •  Lutein-Zeaxanthin 25-5 MG capsule, Take 1 tablet by mouth Daily., Disp: , Rfl:   •  Multiple Vitamins-Minerals (PRESERVISION AREDS 2 PO), Take  by mouth Daily., Disp: , Rfl:   •  nitroglycerin (NITROSTAT) 0.4 MG SL tablet, Place 1 tablet under the tongue Every 5 (Five) Minutes As Needed for Chest Pain. Take no more than 3 doses in 15 minutes., Disp: 20 tablet, Rfl: 0  •  Omega-3 Fatty Acids (OMEGA-3 FISH OIL) 500 MG capsule, Take 500 mg by mouth Daily., Disp: ,  "Rfl:   •  omeprazole (priLOSEC) 40 MG capsule, Take 1 capsule by mouth Daily., Disp: 90 capsule, Rfl: 3  •  butalbital-acetaminophen-caffeine (FIORICET, ESGIC) -40 MG per tablet, Take 1 tablet by mouth Every 6 (Six) Hours As Needed for Headache., Disp: 12 tablet, Rfl: 0  •  ondansetron (ZOFRAN) 4 MG tablet, Take 1 tablet by mouth Every 8 (Eight) Hours As Needed for Nausea or Vomiting., Disp: 12 tablet, Rfl: 0     History of Present Illness   HPI  Pt here for follow up. She hit her head after tripping at home when doing some remodeling and was evaluated in the ED w benign imaging. Has not had blood work in the last year. Is feeling well otherwise. No CP, dyspnea, palps. Not exercising much. Was just in [place] for vacation.    ROS  ROS ROS all normal except what is described in the HPI      SOCIAL HX  Social History     Socioeconomic History   • Marital status:      Spouse name: Not on file   • Number of children: Not on file   • Years of education: Not on file   • Highest education level: Not on file   Tobacco Use   • Smoking status: Never Smoker   • Smokeless tobacco: Never Used   Substance and Sexual Activity   • Alcohol use: Yes     Alcohol/week: 1.2 - 2.4 oz     Types: 1 - 2 Glasses of wine, 1 - 2 Cans of beer per week     Comment: nightly   • Drug use: No   • Sexual activity: Defer       FAMILY HX  Family History   Problem Relation Age of Onset   • Heart disease Mother    • Hypertension Mother    • Kidney disease Mother    • Clotting disorder Mother    • Heart attack Mother    • Hypertension Father    • Stroke Father    • Coronary artery disease Father    • Clotting disorder Father    • Cancer Other        Vitals:    05/17/19 1142   BP: 110/72   BP Location: Right arm   Patient Position: Sitting   Pulse: 77   SpO2: 97%   Weight: 82.1 kg (181 lb)   Height: 162.6 cm (64\")       PHYSICAL EXAMINATION:  Physical Exam   Constitutional: She is oriented to person, place, and time. She appears well-developed and " well-nourished. No distress.   HENT:   Head: Normocephalic and atraumatic.   Right Ear: External ear normal.   Left Ear: External ear normal.   Nose: Nose normal.   Eyes: Conjunctivae and EOM are normal.   Neck: Neck supple. No hepatojugular reflux and no JVD present. Carotid bruit is not present. No thyromegaly present.   Cardiovascular: Normal rate, regular rhythm, S1 normal, S2 normal, normal heart sounds, intact distal pulses and normal pulses. Exam reveals no gallop, no distant heart sounds and no midsystolic click.   No murmur heard.  Pulses:       Radial pulses are 2+ on the right side, and 2+ on the left side.        Dorsalis pedis pulses are 2+ on the right side, and 2+ on the left side.        Posterior tibial pulses are 2+ on the right side, and 2+ on the left side.   Pulmonary/Chest: Effort normal and breath sounds normal. No respiratory distress. She has no decreased breath sounds. She has no wheezes. She has no rhonchi. She has no rales.   Abdominal: Soft. Bowel sounds are normal. There is no hepatosplenomegaly. There is no tenderness.   Musculoskeletal: Normal range of motion. She exhibits no edema.   Neurological: She is alert and oriented to person, place, and time.   No focal deficits.   Skin: Skin is warm and dry. No erythema.   Psychiatric: She has a normal mood and affect. Thought content normal.   Nursing note and vitals reviewed.      Diagnostic Data:  Procedures  Lab Results   Component Value Date    CHLPL 242 (H) 03/17/2016    TRIG 100 04/16/2018    HDL 96 (H) 04/16/2018     Lab Results   Component Value Date    GLUCOSE 111 (H) 04/16/2018    BUN 16 04/16/2018    CREATININE 0.70 04/16/2018     04/16/2018    K 3.6 04/16/2018    CL 97 (L) 04/16/2018    CO2 31.0 04/16/2018     Lab Results   Component Value Date    HGBA1C 5.5 07/16/2015     Lab Results   Component Value Date    WBC 8.40 04/16/2018    HGB 13.4 04/16/2018    HCT 40.3 04/16/2018     04/16/2018       ASSESSMENT:    Diagnosis Plan   1. Essential hypertension  Basic Metabolic Panel    CBC (No Diff)    Lipid Panel   2. Mixed hyperlipidemia  Basic Metabolic Panel    CBC (No Diff)    Lipid Panel          PLAN:    Well controlled on amlodipine and HCTZ    Well controlled, HDL 99, on statin therapy    Counseled on dementia risk reduction w moderate exercise.     Will get labs today.     Scribed for Naga Killian MD by Sis Almodovar PA-C. 5/17/2019  12:20 PM   I, Naga Killian MD, personally performed the services described in this documentation as scribed by the above named individual in my presence, and it is both accurate and complete.  5/17/2019  12:20 PM    Naga Killian MD, [facility]

## 2019-05-17 ENCOUNTER — OFFICE VISIT (OUTPATIENT)
Dept: CARDIOLOGY | Facility: CLINIC | Age: 64
End: 2019-05-17

## 2019-05-17 ENCOUNTER — APPOINTMENT (OUTPATIENT)
Dept: LAB | Facility: HOSPITAL | Age: 64
End: 2019-05-17

## 2019-05-17 VITALS
DIASTOLIC BLOOD PRESSURE: 72 MMHG | SYSTOLIC BLOOD PRESSURE: 110 MMHG | HEIGHT: 64 IN | HEART RATE: 77 BPM | OXYGEN SATURATION: 97 % | WEIGHT: 181 LBS | BODY MASS INDEX: 30.9 KG/M2

## 2019-05-17 DIAGNOSIS — E78.2 MIXED HYPERLIPIDEMIA: ICD-10-CM

## 2019-05-17 DIAGNOSIS — I10 ESSENTIAL HYPERTENSION: Primary | ICD-10-CM

## 2019-05-17 LAB
ANION GAP SERPL CALCULATED.3IONS-SCNC: 16.7 MMOL/L
BUN BLD-MCNC: 13 MG/DL (ref 8–23)
BUN/CREAT SERPL: 18.8 (ref 7–25)
CALCIUM SPEC-SCNC: 10.1 MG/DL (ref 8.6–10.5)
CHLORIDE SERPL-SCNC: 103 MMOL/L (ref 98–107)
CHOLEST SERPL-MCNC: 222 MG/DL (ref 0–200)
CO2 SERPL-SCNC: 24.3 MMOL/L (ref 22–29)
CREAT BLD-MCNC: 0.69 MG/DL (ref 0.57–1)
GFR SERPL CREATININE-BSD FRML MDRD: 85 ML/MIN/1.73
GLUCOSE BLD-MCNC: 89 MG/DL (ref 65–99)
HDLC SERPL-MCNC: 90 MG/DL (ref 40–60)
LDLC SERPL CALC-MCNC: 98 MG/DL (ref 0–100)
LDLC/HDLC SERPL: 1.09 {RATIO}
POTASSIUM BLD-SCNC: 4.9 MMOL/L (ref 3.5–5.2)
SODIUM BLD-SCNC: 144 MMOL/L (ref 136–145)
TRIGL SERPL-MCNC: 171 MG/DL (ref 0–150)
VLDLC SERPL-MCNC: 34.2 MG/DL (ref 5–40)

## 2019-05-17 PROCEDURE — 99213 OFFICE O/P EST LOW 20 MIN: CPT | Performed by: INTERNAL MEDICINE

## 2019-05-17 PROCEDURE — 85027 COMPLETE CBC AUTOMATED: CPT | Performed by: PHYSICIAN ASSISTANT

## 2019-05-17 PROCEDURE — 36415 COLL VENOUS BLD VENIPUNCTURE: CPT | Performed by: INTERNAL MEDICINE

## 2019-05-17 PROCEDURE — 80048 BASIC METABOLIC PNL TOTAL CA: CPT | Performed by: PHYSICIAN ASSISTANT

## 2019-05-17 PROCEDURE — 80061 LIPID PANEL: CPT | Performed by: PHYSICIAN ASSISTANT

## 2019-05-18 LAB
DEPRECATED RDW RBC AUTO: 46.5 FL (ref 37–54)
ERYTHROCYTE [DISTWIDTH] IN BLOOD BY AUTOMATED COUNT: 13.1 % (ref 12.3–15.4)
HCT VFR BLD AUTO: 43.7 % (ref 34–46.6)
HGB BLD-MCNC: 13.7 G/DL (ref 12–15.9)
MCH RBC QN AUTO: 30.6 PG (ref 26.6–33)
MCHC RBC AUTO-ENTMCNC: 31.4 G/DL (ref 31.5–35.7)
MCV RBC AUTO: 97.5 FL (ref 79–97)
PLATELET # BLD AUTO: 262 10*3/MM3 (ref 140–450)
PMV BLD AUTO: 11.7 FL (ref 6–12)
RBC # BLD AUTO: 4.48 10*6/MM3 (ref 3.77–5.28)
WBC NRBC COR # BLD: 7.13 10*3/MM3 (ref 3.4–10.8)

## 2019-07-16 ENCOUNTER — OFFICE VISIT (OUTPATIENT)
Dept: SLEEP MEDICINE | Facility: HOSPITAL | Age: 64
End: 2019-07-16

## 2019-07-16 VITALS
DIASTOLIC BLOOD PRESSURE: 76 MMHG | BODY MASS INDEX: 30.73 KG/M2 | WEIGHT: 180 LBS | HEART RATE: 81 BPM | OXYGEN SATURATION: 96 % | HEIGHT: 64 IN | SYSTOLIC BLOOD PRESSURE: 116 MMHG

## 2019-07-16 DIAGNOSIS — G47.33 SEVERE OBSTRUCTIVE SLEEP APNEA: Primary | ICD-10-CM

## 2019-07-16 PROCEDURE — 99212 OFFICE O/P EST SF 10 MIN: CPT | Performed by: NURSE PRACTITIONER

## 2019-07-16 NOTE — PROGRESS NOTES
Chief Complaint:   Chief Complaint   Patient presents with   • Follow-up       HPI:    Ana Alaniz is a 64 y.o. female here for follow-up of sleep apnea.  Patient was last seen 5/14/2019 patient was having trouble with leaking and did need a new machine.  At that time patient only intermittently felt refreshed after awakening.  Patient now has a new machine and feels she is doing much better.  Patient is sleeping 7 8 hours nightly and does feel refreshed upon awakening.  Patient has an Stanton score of 3/24.  Patient has no concerns or complaints today and wishes to continue CPAP.        Current medications are:   Current Outpatient Medications:   •  amLODIPine (NORVASC) 5 MG tablet, Take 1 tablet by mouth Daily., Disp: 90 tablet, Rfl: 3  •  atorvastatin (LIPITOR) 10 MG tablet, Take 1 tablet by mouth Daily., Disp: 90 tablet, Rfl: 3  •  fexofenadine (ALLEGRA) 180 MG tablet, Take 180 mg by mouth Daily., Disp: , Rfl:   •  Flaxseed, Linseed, (FLAX SEED OIL) 1300 MG capsule, Take 1 tablet by mouth Daily., Disp: , Rfl:   •  Glucosamine-Chondroit-Vit C-Mn (GLUCOSAMINE CHONDR 1500 COMPLX) capsule, Take 1,500 mg by mouth 2 (Two) Times a Day., Disp: , Rfl:   •  hydrochlorothiazide (HYDRODIURIL) 25 MG tablet, Take 1 tablet by mouth Daily., Disp: 90 tablet, Rfl: 3  •  losartan (COZAAR) 50 MG tablet, Take 1 tablet by mouth Daily., Disp: 90 tablet, Rfl: 3  •  Lutein-Zeaxanthin 25-5 MG capsule, Take 1 tablet by mouth Daily., Disp: , Rfl:   •  Multiple Vitamins-Minerals (PRESERVISION AREDS 2 PO), Take  by mouth Daily., Disp: , Rfl:   •  nitroglycerin (NITROSTAT) 0.4 MG SL tablet, Place 1 tablet under the tongue Every 5 (Five) Minutes As Needed for Chest Pain. Take no more than 3 doses in 15 minutes., Disp: 20 tablet, Rfl: 0  •  Omega-3 Fatty Acids (OMEGA-3 FISH OIL) 500 MG capsule, Take 500 mg by mouth Daily., Disp: , Rfl:   •  omeprazole (priLOSEC) 40 MG capsule, Take 1 capsule by mouth Daily., Disp: 90 capsule, Rfl:  3.      The patient's relevant past medical, surgical, family and social history were reviewed and updated in Epic as appropriate.       Review of Systems   HENT: Positive for postnasal drip.    Respiratory: Positive for apnea.    Gastrointestinal:        Heartburn   Musculoskeletal: Positive for arthralgias and joint swelling.   Psychiatric/Behavioral: Positive for sleep disturbance.   All other systems reviewed and are negative.        Objective:    Physical Exam   Constitutional: She is oriented to person, place, and time. She appears well-developed and well-nourished.   HENT:   Head: Normocephalic and atraumatic.   Mouth/Throat: Oropharynx is clear and moist.   Mallampati 1 anatomy   Eyes: Conjunctivae are normal.   Neck: Neck supple. No thyromegaly present.   Cardiovascular: Normal rate and regular rhythm.   Pulmonary/Chest: Effort normal and breath sounds normal.   Lymphadenopathy:     She has no cervical adenopathy.   Neurological: She is alert and oriented to person, place, and time.   Skin: Skin is warm and dry.   Psychiatric: She has a normal mood and affect. Her behavior is normal. Judgment and thought content normal.   Nursing note and vitals reviewed.  29/30 days of use.  Greater than 4-hour use 93.3.  90% pressure 10.3.  AHI of 1.3.  Download reviewed with patient.      ASSESSMENT/PLAN    Ana was seen today for follow-up.    Diagnoses and all orders for this visit:    Severe obstructive sleep apnea  -     CPAP Therapy            1. Counseled patient regarding multimodal approach with healthy nutrition, healthy sleep, regular physical activity, social activities, counseling, and medications. Encouraged to practice lateral sleep position. Avoid alcohol and sedatives close to bedtime.  2.   Refill supplies x1 year.  Return to clinic 1 year or sooner if symptoms warrant.  I have reviewed the results of my evaluation and impression and discussed my recommendations in detail with the patient.      Signed  by  Dia Ramey, APRN    July 16, 2019      CC: Tom Bernal MD          No ref. provider found

## 2019-07-16 NOTE — PATIENT INSTRUCTIONS

## 2019-11-18 ENCOUNTER — FLU SHOT (OUTPATIENT)
Dept: FAMILY MEDICINE CLINIC | Facility: CLINIC | Age: 64
End: 2019-11-18

## 2019-11-18 DIAGNOSIS — Z23 NEED FOR INFLUENZA VACCINATION: ICD-10-CM

## 2019-11-18 PROCEDURE — 90653 IIV ADJUVANT VACCINE IM: CPT | Performed by: FAMILY MEDICINE

## 2019-11-18 PROCEDURE — G0008 ADMIN INFLUENZA VIRUS VAC: HCPCS | Performed by: FAMILY MEDICINE

## 2019-11-23 ENCOUNTER — HOSPITAL ENCOUNTER (EMERGENCY)
Facility: HOSPITAL | Age: 64
Discharge: HOME | End: 2019-11-24
Attending: EMERGENCY MEDICINE | Admitting: EMERGENCY MEDICINE

## 2019-11-23 ENCOUNTER — APPOINTMENT (OUTPATIENT)
Dept: CT IMAGING | Facility: HOSPITAL | Age: 64
End: 2019-11-23

## 2019-11-23 DIAGNOSIS — Z78.9 CONSUMES ALCOHOL: ICD-10-CM

## 2019-11-23 DIAGNOSIS — S09.90XA INJURY OF HEAD, INITIAL ENCOUNTER: Primary | ICD-10-CM

## 2019-11-23 DIAGNOSIS — S00.03XA HEMATOMA OF SCALP, INITIAL ENCOUNTER: ICD-10-CM

## 2019-11-23 DIAGNOSIS — S06.0X0A CONCUSSION WITHOUT LOSS OF CONSCIOUSNESS, INITIAL ENCOUNTER: ICD-10-CM

## 2019-11-23 PROCEDURE — 36415 COLL VENOUS BLD VENIPUNCTURE: CPT

## 2019-11-23 PROCEDURE — 99284 EMERGENCY DEPT VISIT MOD MDM: CPT

## 2019-11-23 PROCEDURE — 70450 CT HEAD/BRAIN W/O DYE: CPT

## 2019-11-24 VITALS
BODY MASS INDEX: 32.1 KG/M2 | WEIGHT: 188 LBS | SYSTOLIC BLOOD PRESSURE: 115 MMHG | OXYGEN SATURATION: 96 % | TEMPERATURE: 97.7 F | DIASTOLIC BLOOD PRESSURE: 72 MMHG | HEIGHT: 64 IN | HEART RATE: 96 BPM | RESPIRATION RATE: 16 BRPM

## 2019-11-24 LAB
ALBUMIN SERPL-MCNC: 4.4 G/DL (ref 3.5–5.2)
ALBUMIN/GLOB SERPL: 1.5 G/DL
ALP SERPL-CCNC: 75 U/L (ref 39–117)
ALT SERPL W P-5'-P-CCNC: 17 U/L (ref 1–33)
ANION GAP SERPL CALCULATED.3IONS-SCNC: 18 MMOL/L (ref 5–15)
AST SERPL-CCNC: 22 U/L (ref 1–32)
BASOPHILS # BLD AUTO: 0.05 10*3/MM3 (ref 0–0.2)
BASOPHILS NFR BLD AUTO: 0.7 % (ref 0–1.5)
BILIRUB SERPL-MCNC: 0.3 MG/DL (ref 0.2–1.2)
BUN BLD-MCNC: 13 MG/DL (ref 8–23)
BUN/CREAT SERPL: 18.6 (ref 7–25)
CALCIUM SPEC-SCNC: 9.2 MG/DL (ref 8.6–10.5)
CHLORIDE SERPL-SCNC: 96 MMOL/L (ref 98–107)
CO2 SERPL-SCNC: 21 MMOL/L (ref 22–29)
CREAT BLD-MCNC: 0.7 MG/DL (ref 0.57–1)
DEPRECATED RDW RBC AUTO: 44.3 FL (ref 37–54)
EOSINOPHIL # BLD AUTO: 0.1 10*3/MM3 (ref 0–0.4)
EOSINOPHIL NFR BLD AUTO: 1.3 % (ref 0.3–6.2)
ERYTHROCYTE [DISTWIDTH] IN BLOOD BY AUTOMATED COUNT: 12.8 % (ref 12.3–15.4)
ETHANOL BLD-MCNC: 209 MG/DL (ref 0–10)
GFR SERPL CREATININE-BSD FRML MDRD: 84 ML/MIN/1.73
GLOBULIN UR ELPH-MCNC: 2.9 GM/DL
GLUCOSE BLD-MCNC: 162 MG/DL (ref 65–99)
HCT VFR BLD AUTO: 36.6 % (ref 34–46.6)
HGB BLD-MCNC: 12 G/DL (ref 12–15.9)
IMM GRANULOCYTES # BLD AUTO: 0.04 10*3/MM3 (ref 0–0.05)
IMM GRANULOCYTES NFR BLD AUTO: 0.5 % (ref 0–0.5)
LYMPHOCYTES # BLD AUTO: 1.86 10*3/MM3 (ref 0.7–3.1)
LYMPHOCYTES NFR BLD AUTO: 24.8 % (ref 19.6–45.3)
MCH RBC QN AUTO: 30.9 PG (ref 26.6–33)
MCHC RBC AUTO-ENTMCNC: 32.8 G/DL (ref 31.5–35.7)
MCV RBC AUTO: 94.3 FL (ref 79–97)
MONOCYTES # BLD AUTO: 0.46 10*3/MM3 (ref 0.1–0.9)
MONOCYTES NFR BLD AUTO: 6.1 % (ref 5–12)
NEUTROPHILS # BLD AUTO: 5 10*3/MM3 (ref 1.7–7)
NEUTROPHILS NFR BLD AUTO: 66.6 % (ref 42.7–76)
NRBC BLD AUTO-RTO: 0 /100 WBC (ref 0–0.2)
PLATELET # BLD AUTO: 210 10*3/MM3 (ref 140–450)
PMV BLD AUTO: 10.2 FL (ref 6–12)
POTASSIUM BLD-SCNC: 3.4 MMOL/L (ref 3.5–5.2)
PROT SERPL-MCNC: 7.3 G/DL (ref 6–8.5)
RBC # BLD AUTO: 3.88 10*6/MM3 (ref 3.77–5.28)
SODIUM BLD-SCNC: 135 MMOL/L (ref 136–145)
TROPONIN T SERPL-MCNC: <0.01 NG/ML (ref 0–0.03)
WBC NRBC COR # BLD: 7.51 10*3/MM3 (ref 3.4–10.8)

## 2019-11-24 PROCEDURE — 84484 ASSAY OF TROPONIN QUANT: CPT | Performed by: EMERGENCY MEDICINE

## 2019-11-24 PROCEDURE — 85025 COMPLETE CBC W/AUTO DIFF WBC: CPT | Performed by: EMERGENCY MEDICINE

## 2019-11-24 PROCEDURE — 80053 COMPREHEN METABOLIC PANEL: CPT | Performed by: EMERGENCY MEDICINE

## 2019-11-24 PROCEDURE — 93005 ELECTROCARDIOGRAM TRACING: CPT | Performed by: EMERGENCY MEDICINE

## 2019-11-24 PROCEDURE — 36415 COLL VENOUS BLD VENIPUNCTURE: CPT

## 2019-11-24 PROCEDURE — 80307 DRUG TEST PRSMV CHEM ANLYZR: CPT | Performed by: EMERGENCY MEDICINE

## 2019-11-24 RX ORDER — ACETAMINOPHEN 500 MG
1000 TABLET ORAL ONCE
Status: COMPLETED | OUTPATIENT
Start: 2019-11-24 | End: 2019-11-24

## 2019-11-24 RX ADMIN — ACETAMINOPHEN 1000 MG: 500 TABLET, FILM COATED ORAL at 01:41

## 2019-11-24 NOTE — DISCHARGE INSTRUCTIONS
I have referred you to the syncope clinic at the [facility] heart and valve clinic here on campus.  If they do not call you by Tuesday morning I recommend calling them.    Please do your best to limit alcohol consumption as this could be playing a factor in your falls and injuries.    Please review the medications you are supposed to be taking according to prior physician recommendations. I have not changed your home medications during this visit. If your discharge instructions indicate that I have changed your home medications, this is not the case, and you should disregard. If you have any questions about the medication you should be taking at home, please call your physician.

## 2019-11-24 NOTE — ED PROVIDER NOTES
Subjective   This pleasant 64-year-old female presents to the emergency department complaining of head pain status post fall.  She reports she was in her laundry room.  She reports there was no tripping hazard on the floor but somehow she fell to the floor striking the occipital region of her head.  She does not believe she had a loss of consciousness.  She cannot give me a good reason for why she fell.  She was ambulatory after the fall.  She rates her head discomfort moderate to severe.  She does not take any blood thinners.  She has not taken any medications for symptom relief.  She does not recall having any palpitations or presyncopal symptoms prior to falling.  She does report an episode very similar to this 1 occurring approximately 1 year ago.  She had a negative CT scan at that point.  She has not been worked up for syncope in the past.  She does admit to drinking alcohol tonight and is uncertain as to just how much she has consumed.            Review of Systems   Constitutional: Negative for chills, diaphoresis and fever.   HENT: Negative for congestion.    Respiratory: Negative for cough and shortness of breath.    Cardiovascular: Negative for chest pain, palpitations and leg swelling.   Gastrointestinal: Negative for abdominal pain, nausea and vomiting.   Musculoskeletal: Negative for back pain and myalgias.   Skin: Negative for pallor.        No sweating episodes   Neurological: Positive for headaches. Negative for dizziness.   Psychiatric/Behavioral: Negative for agitation and confusion.   All other systems reviewed and are negative.      Past Medical History:   Diagnosis Date   • Acute pharyngitis    • Allergy    • Arrhythmia    • Degenerative arthritis    • Degenerative joint disease 11/8/2016   • Epistaxis    • Glaucoma    • Hypertension    • Positive KENY (antinuclear antibody)    • Right foot pain    • Sinusitis    • Sleep apnea    • Transfusion history    • URI (upper respiratory infection)         Allergies   Allergen Reactions   • Tetanus Toxoids Other (See Comments)     Unknown reaction   • Ace Inhibitors Cough       Past Surgical History:   Procedure Laterality Date   • APPENDECTOMY     • CATARACT EXTRACTION, BILATERAL     • OTHER SURGICAL HISTORY      LT Knee scar excision   • REPLACEMENT TOTAL KNEE Right    • TOTAL KNEE ARTHROPLASTY Left        Family History   Problem Relation Age of Onset   • Heart disease Mother    • Hypertension Mother    • Kidney disease Mother    • Clotting disorder Mother    • Heart attack Mother    • Hypertension Father    • Stroke Father    • Coronary artery disease Father    • Clotting disorder Father    • Cancer Other        Social History     Socioeconomic History   • Marital status:      Spouse name: Not on file   • Number of children: Not on file   • Years of education: Not on file   • Highest education level: Not on file   Tobacco Use   • Smoking status: Never Smoker   • Smokeless tobacco: Never Used   Substance and Sexual Activity   • Alcohol use: Yes     Alcohol/week: 1.2 - 2.4 oz     Types: 1 - 2 Glasses of wine, 1 - 2 Cans of beer per week     Comment: nightly   • Drug use: No   • Sexual activity: Defer           Objective   Physical Exam   Constitutional: She is oriented to person, place, and time. She appears well-developed and well-nourished.   No discernible alcohol on breath.  No significant evidence of intoxication.   HENT:   Head: Atraumatic.       Eyes: Conjunctivae, EOM and lids are normal.   Neck: Trachea normal, normal range of motion and phonation normal. Neck supple. No JVD present.   Cardiovascular: Normal rate, regular rhythm, normal heart sounds, intact distal pulses and normal pulses.  No extrasystoles are present. Exam reveals no gallop and no friction rub.   No murmur heard.  Pulmonary/Chest: Effort normal and breath sounds normal. No accessory muscle usage. No respiratory distress. She has no decreased breath sounds. She has no wheezes.  She has no rhonchi. She has no rales. She exhibits no tenderness.   Abdominal: Soft. Normal appearance. There is no hepatosplenomegaly. There is no tenderness.   Musculoskeletal: Normal range of motion. She exhibits no edema.     Vascular Status -  Her right foot exhibits no edema. Her left foot exhibits no edema.  Lymphadenopathy:     She has no cervical adenopathy.   Neurological: She is alert and oriented to person, place, and time. Coordination normal.   Skin: Skin is warm and dry. No rash noted. She is not diaphoretic.   Psychiatric: She has a normal mood and affect. Her speech is normal and behavior is normal.   Nursing note and vitals reviewed.      Procedures           ED Course  ED Course as of Nov 24 1517   Sun Nov 24, 2019   0122 Ethanol: (!) 209 [MS]      ED Course User Index  [MS] Rafiq Field MD      Recent Results (from the past 24 hour(s))   Comprehensive Metabolic Panel    Collection Time: 11/24/19 12:28 AM   Result Value Ref Range    Glucose 162 (H) 65 - 99 mg/dL    BUN 13 8 - 23 mg/dL    Creatinine 0.70 0.57 - 1.00 mg/dL    Sodium 135 (L) 136 - 145 mmol/L    Potassium 3.4 (L) 3.5 - 5.2 mmol/L    Chloride 96 (L) 98 - 107 mmol/L    CO2 21.0 (L) 22.0 - 29.0 mmol/L    Calcium 9.2 8.6 - 10.5 mg/dL    Total Protein 7.3 6.0 - 8.5 g/dL    Albumin 4.40 3.50 - 5.20 g/dL    ALT (SGPT) 17 1 - 33 U/L    AST (SGOT) 22 1 - 32 U/L    Alkaline Phosphatase 75 39 - 117 U/L    Total Bilirubin 0.3 0.2 - 1.2 mg/dL    eGFR Non African Amer 84 >60 mL/min/1.73    Globulin 2.9 gm/dL    A/G Ratio 1.5 g/dL    BUN/Creatinine Ratio 18.6 7.0 - 25.0    Anion Gap 18.0 (H) 5.0 - 15.0 mmol/L   Ethanol    Collection Time: 11/24/19 12:28 AM   Result Value Ref Range    Ethanol 209 (H) 0 - 10 mg/dL   Troponin    Collection Time: 11/24/19 12:28 AM   Result Value Ref Range    Troponin T <0.010 0.000 - 0.030 ng/mL   CBC Auto Differential    Collection Time: 11/24/19 12:28 AM   Result Value Ref Range    WBC 7.51 3.40 - 10.80 10*3/mm3     RBC 3.88 3.77 - 5.28 10*6/mm3    Hemoglobin 12.0 12.0 - 15.9 g/dL    Hematocrit 36.6 34.0 - 46.6 %    MCV 94.3 79.0 - 97.0 fL    MCH 30.9 26.6 - 33.0 pg    MCHC 32.8 31.5 - 35.7 g/dL    RDW 12.8 12.3 - 15.4 %    RDW-SD 44.3 37.0 - 54.0 fl    MPV 10.2 6.0 - 12.0 fL    Platelets 210 140 - 450 10*3/mm3    Neutrophil % 66.6 42.7 - 76.0 %    Lymphocyte % 24.8 19.6 - 45.3 %    Monocyte % 6.1 5.0 - 12.0 %    Eosinophil % 1.3 0.3 - 6.2 %    Basophil % 0.7 0.0 - 1.5 %    Immature Grans % 0.5 0.0 - 0.5 %    Neutrophils, Absolute 5.00 1.70 - 7.00 10*3/mm3    Lymphocytes, Absolute 1.86 0.70 - 3.10 10*3/mm3    Monocytes, Absolute 0.46 0.10 - 0.90 10*3/mm3    Eosinophils, Absolute 0.10 0.00 - 0.40 10*3/mm3    Basophils, Absolute 0.05 0.00 - 0.20 10*3/mm3    Immature Grans, Absolute 0.04 0.00 - 0.05 10*3/mm3    nRBC 0.0 0.0 - 0.2 /100 WBC     Note: In addition to lab results from this visit, the labs listed above may include labs taken at another facility or during a different encounter within the last 24 hours. Please correlate lab times with ED admission and discharge times for further clarification of the services performed during this visit.    CT Head Without Contrast   Final Result   No acute intracranial abnormality identified. Large occipital scalp contusion/hematoma..               Signer Name: KAVEH Sparrow MD    Signed: 11/23/2019 11:58 PM    Workstation Name: [id number]     [facility]        Vitals:    11/24/19 0029 11/24/19 0030 11/24/19 0033 11/24/19 0100   BP: 113/89 113/89  115/72   Patient Position: Sitting      Pulse: 96      Resp:       Temp:       TempSrc:       SpO2:   96% 96%   Weight:       Height:         Medications   acetaminophen (TYLENOL) tablet 1,000 mg (1,000 mg Oral Given 11/24/19 0141)     ECG/EMG Results (last 24 hours)     Procedure Component Value Units Date/Time    ECG 12 Lead [[id number]] Collected:  11/24/19 0028     Updated:  11/24/19 0115    Narrative:        Test Reason : syncope  Blood Pressure : **/** mmHG  Vent. Rate : 073 BPM     Atrial Rate : 073 BPM     P-R Int : 134 ms          QRS Dur : 148 ms      QT Int : 456 ms       P-R-T Axes : 053 122 049 degrees     QTc Int : 502 ms    Normal sinus rhythm  Right bundle branch block  Abnormal ECG  When compared with ECG of 16-JUL-2015 12:29,  No significant change was found  Confirmed by RUDDY MÁRQUEZ MD ([id number]) on 11/24/2019 1:15:44 AM    Referred By:  ED MD           Confirmed By:RUDDY MÁRQUEZ MD        ECG 12 Lead   Final Result   Test Reason : syncope   Blood Pressure : **/** mmHG   Vent. Rate : 073 BPM     Atrial Rate : 073 BPM      P-R Int : 134 ms          QRS Dur : 148 ms       QT Int : 456 ms       P-R-T Axes : 053 122 049 degrees      QTc Int : 502 ms      Normal sinus rhythm   Right bundle branch block   Abnormal ECG   When compared with ECG of 16-JUL-2015 12:29,   No significant change was found   Confirmed by RUDDY MÁRQUEZ MD ([id number]) on 11/24/2019 1:15:44 AM      Referred By:  ED MD           Confirmed By:RUDDY MÁRQUEZ MD                      [facility]    Final diagnoses:   Injury of head, initial encounter   Hematoma of scalp, initial encounter   Concussion without loss of consciousness, initial encounter   Consumes alcohol              Ruddy Márquez MD  11/24/19 8990

## 2019-12-03 ENCOUNTER — OFFICE VISIT (OUTPATIENT)
Dept: CARDIOLOGY | Facility: HOSPITAL | Age: 64
End: 2019-12-03

## 2019-12-03 ENCOUNTER — HOSPITAL ENCOUNTER (OUTPATIENT)
Dept: CARDIOLOGY | Facility: HOSPITAL | Age: 64
Discharge: HOME | End: 2019-12-03

## 2019-12-03 VITALS
OXYGEN SATURATION: 98 % | HEIGHT: 64 IN | HEART RATE: 82 BPM | WEIGHT: 183 LBS | TEMPERATURE: 97.5 F | RESPIRATION RATE: 18 BRPM | SYSTOLIC BLOOD PRESSURE: 139 MMHG | DIASTOLIC BLOOD PRESSURE: 74 MMHG | BODY MASS INDEX: 31.24 KG/M2

## 2019-12-03 DIAGNOSIS — R01.1 HEART MURMUR: ICD-10-CM

## 2019-12-03 DIAGNOSIS — R55 SYNCOPE, UNSPECIFIED SYNCOPE TYPE: Primary | ICD-10-CM

## 2019-12-03 DIAGNOSIS — R55 SYNCOPE, UNSPECIFIED SYNCOPE TYPE: ICD-10-CM

## 2019-12-03 DIAGNOSIS — I10 ESSENTIAL HYPERTENSION: ICD-10-CM

## 2019-12-03 PROCEDURE — 99214 OFFICE O/P EST MOD 30 MIN: CPT | Performed by: NURSE PRACTITIONER

## 2019-12-03 NOTE — PROGRESS NOTES
[facility]  Heart and Valve Center      Encounter Date:12/03/2019     Ana Alaniz  [address] [facility] [address]  [unfilled]    1955    Tom Bernal MD    Ana Alaniz is a 64 y.o. female.      Subjective:     Chief Complaint:  Loss of Consciousness and Establish Care       HPI   Patient is a 64-year-old female with past medical history significant for severe sleep apnea and hypertension who presents to the [facility] Valve [facility] at the request of Dr. Feild for evaluation of syncope.  Patient presented to the ED on 11/24/2019 complaining of head pain after she fell in her laundry room.  She denies tripping or awareness of any hazards on the floor but somehow she fell to the floor striking the back of her head.  She does not believe that she lost consciousness, but is not sure why she fell.    She admits that she probably had too much too drink.  Was drinking bourbon and water at the time. Ethanol levels were 209.  Typically drinks 3 or more drinks a night. CTA head showed no acute abnormalities except for a large occipital scalp contusion/hematoma.  EKG unremarkable. She does report having an episode similar to this approximately 1 year ago. However, was not drinking at the time. She notes intermittent episodes of feeling her heart race, which has improved since she retired. No recent palpitations, shortness of breath or chest pain.      Patient Active Problem List   Diagnosis   • Severe obstructive sleep apnea   • Hypertension   • Degenerative joint disease   • Chronic rhinitis   • Hyperlipidemia   • Gastroesophageal reflux disease   • Medicare welcome visit       Past Medical History:   Diagnosis Date   • Acute pharyngitis    • Allergy    • Arrhythmia    • Degenerative arthritis    • Degenerative joint disease 11/8/2016   • Epistaxis    • Glaucoma    • Hypertension    • Positive KENY (antinuclear antibody)    • Right foot pain    • Sinusitis    • Sleep apnea    • Transfusion  history    • URI (upper respiratory infection)        Past Surgical History:   Procedure Laterality Date   • APPENDECTOMY     • CATARACT EXTRACTION, BILATERAL     • OTHER SURGICAL HISTORY      LT Knee scar excision   • REPLACEMENT TOTAL KNEE Right    • TOTAL KNEE ARTHROPLASTY Left        Family History   Problem Relation Age of Onset   • Heart disease Mother    • Hypertension Mother    • Kidney disease Mother    • Clotting disorder Mother    • Heart attack Mother    • Hypertension Father    • Stroke Father    • Coronary artery disease Father    • Clotting disorder Father    • Cancer Other    • Hypertension Brother    • Cancer Maternal Grandmother    • No Known Problems Maternal Grandfather    • No Known Problems Paternal Grandmother    • No Known Problems Paternal Grandfather    • No Known Problems Brother    • Stroke Brother    • Heart disease Brother    • No Known Problems Brother    • No Known Problems Brother        Social History     Socioeconomic History   • Marital status:      Spouse name: Not on file   • Number of children: Not on file   • Years of education: Not on file   • Highest education level: Not on file   Tobacco Use   • Smoking status: Never Smoker   • Smokeless tobacco: Never Used   Substance and Sexual Activity   • Alcohol use: Yes     Alcohol/week: 1.2 - 2.4 oz     Types: 1 - 2 Glasses of wine, 1 - 2 Cans of beer per week     Frequency: 4 or more times a week     Drinks per session: 1 or 2     Comment: nightly   • Drug use: No   • Sexual activity: Defer   Social History Narrative    Caffeine: 1/2 diet cola daily, 1-2 cups coffee daily       Allergies   Allergen Reactions   • Tetanus Toxoids Other (See Comments)     Unknown reaction   • Ace Inhibitors Cough         Current Outpatient Medications:   •  amLODIPine (NORVASC) 5 MG tablet, Take 1 tablet by mouth Daily., Disp: 90 tablet, Rfl: 3  •  atorvastatin (LIPITOR) 10 MG tablet, Take 1 tablet by mouth Daily., Disp: 90 tablet, Rfl: 3  •   fexofenadine (ALLEGRA) 180 MG tablet, Take 180 mg by mouth Daily., Disp: , Rfl:   •  Flaxseed, Linseed, (FLAX SEED OIL) 1300 MG capsule, Take 1 tablet by mouth Daily., Disp: , Rfl:   •  Glucosamine-Chondroit-Vit C-Mn (GLUCOSAMINE CHONDR 1500 COMPLX) capsule, Take 1,500 mg by mouth 2 (Two) Times a Day., Disp: , Rfl:   •  hydrochlorothiazide (HYDRODIURIL) 25 MG tablet, Take 1 tablet by mouth Daily., Disp: 90 tablet, Rfl: 3  •  losartan (COZAAR) 50 MG tablet, Take 1 tablet by mouth Daily., Disp: 90 tablet, Rfl: 3  •  Lutein-Zeaxanthin 25-5 MG capsule, Take 1 tablet by mouth Daily., Disp: , Rfl:   •  Multiple Vitamins-Minerals (PRESERVISION AREDS 2 PO), Take  by mouth Daily., Disp: , Rfl:   •  nitroglycerin (NITROSTAT) 0.4 MG SL tablet, Place 1 tablet under the tongue Every 5 (Five) Minutes As Needed for Chest Pain. Take no more than 3 doses in 15 minutes., Disp: 20 tablet, Rfl: 0  •  Omega-3 Fatty Acids (OMEGA-3 FISH OIL) 500 MG capsule, Take 500 mg by mouth Daily., Disp: , Rfl:   •  omeprazole (priLOSEC) 40 MG capsule, Take 1 capsule by mouth Daily., Disp: 90 capsule, Rfl: 3    The following portions of the patient's history were reviewed today and updated as appropriate: allergies, current medications, past family history, past medical history, past social history, past surgical history and problem list     Review of Systems   Constitution: Negative for chills and fever.   Eyes: Negative.    Cardiovascular: Negative for chest pain, claudication, cyanosis, dyspnea on exertion, irregular heartbeat, leg swelling, near-syncope, orthopnea, palpitations, paroxysmal nocturnal dyspnea and syncope.   Respiratory: Negative for cough, shortness of breath and snoring.    Endocrine: Negative.    Hematologic/Lymphatic: Does not bruise/bleed easily.   Skin: Negative for poor wound healing.   Musculoskeletal: Negative.    Gastrointestinal: Negative for abdominal pain, heartburn, hematemesis, melena, nausea and vomiting.  "  Genitourinary: Negative.  Negative for hematuria.   Neurological: Positive for headaches.   Psychiatric/Behavioral: Negative.    Allergic/Immunologic: Negative.        Objective:     Vitals:    12/03/19 1048 12/03/19 1049 12/03/19 1050   BP: 137/73 134/71 139/74   BP Location: Right arm  Left arm   Patient Position: Sitting  Standing   Cuff Size: Adult  Adult   Pulse: 78 75 82   Resp: 18     Temp: 97.5 °F (36.4 °C)     TempSrc: Temporal     SpO2: 97% 98% 98%   Weight: 83 kg (183 lb)     Height: 162.6 cm (64\")         Body mass index is 31.41 kg/m².    Physical Exam   Constitutional: She is oriented to person, place, and time. She appears well-developed and well-nourished. No distress.   HENT:   Head: Normocephalic.   Eyes: Conjunctivae are normal. Pupils are equal, round, and reactive to light.   Neck: Neck supple. No JVD present. No thyromegaly present.   Cardiovascular: Normal rate, regular rhythm and intact distal pulses. Exam reveals no gallop and no friction rub.   Murmur (BEVERLEY II) heard.  Pulmonary/Chest: Effort normal and breath sounds normal. No respiratory distress. She has no wheezes. She has no rales. She exhibits no tenderness.   Abdominal: Soft. Bowel sounds are normal.   Musculoskeletal: Normal range of motion. She exhibits no edema.   Neurological: She is alert and oriented to person, place, and time.   Large contusion/hematoma on left occipital region   Skin: Skin is warm and dry.   Psychiatric: She has a normal mood and affect. Her behavior is normal. Thought content normal.   Vitals reviewed.      Lab and Diagnostic Review:  Results for orders placed or performed during the hospital encounter of 11/23/19   Comprehensive Metabolic Panel   Result Value Ref Range    Glucose 162 (H) 65 - 99 mg/dL    BUN 13 8 - 23 mg/dL    Creatinine 0.70 0.57 - 1.00 mg/dL    Sodium 135 (L) 136 - 145 mmol/L    Potassium 3.4 (L) 3.5 - 5.2 mmol/L    Chloride 96 (L) 98 - 107 mmol/L    CO2 21.0 (L) 22.0 - 29.0 mmol/L    " Calcium 9.2 8.6 - 10.5 mg/dL    Total Protein 7.3 6.0 - 8.5 g/dL    Albumin 4.40 3.50 - 5.20 g/dL    ALT (SGPT) 17 1 - 33 U/L    AST (SGOT) 22 1 - 32 U/L    Alkaline Phosphatase 75 39 - 117 U/L    Total Bilirubin 0.3 0.2 - 1.2 mg/dL    eGFR Non African Amer 84 >60 mL/min/1.73    Globulin 2.9 gm/dL    A/G Ratio 1.5 g/dL    BUN/Creatinine Ratio 18.6 7.0 - 25.0    Anion Gap 18.0 (H) 5.0 - 15.0 mmol/L   Ethanol   Result Value Ref Range    Ethanol 209 (H) 0 - 10 mg/dL   Troponin   Result Value Ref Range    Troponin T <0.010 0.000 - 0.030 ng/mL   CBC Auto Differential   Result Value Ref Range    WBC 7.51 3.40 - 10.80 10*3/mm3    RBC 3.88 3.77 - 5.28 10*6/mm3    Hemoglobin 12.0 12.0 - 15.9 g/dL    Hematocrit 36.6 34.0 - 46.6 %    MCV 94.3 79.0 - 97.0 fL    MCH 30.9 26.6 - 33.0 pg    MCHC 32.8 31.5 - 35.7 g/dL    RDW 12.8 12.3 - 15.4 %    RDW-SD 44.3 37.0 - 54.0 fl    MPV 10.2 6.0 - 12.0 fL    Platelets 210 140 - 450 10*3/mm3    Neutrophil % 66.6 42.7 - 76.0 %    Lymphocyte % 24.8 19.6 - 45.3 %    Monocyte % 6.1 5.0 - 12.0 %    Eosinophil % 1.3 0.3 - 6.2 %    Basophil % 0.7 0.0 - 1.5 %    Immature Grans % 0.5 0.0 - 0.5 %    Neutrophils, Absolute 5.00 1.70 - 7.00 10*3/mm3    Lymphocytes, Absolute 1.86 0.70 - 3.10 10*3/mm3    Monocytes, Absolute 0.46 0.10 - 0.90 10*3/mm3    Eosinophils, Absolute 0.10 0.00 - 0.40 10*3/mm3    Basophils, Absolute 0.05 0.00 - 0.20 10*3/mm3    Immature Grans, Absolute 0.04 0.00 - 0.05 10*3/mm3    nRBC 0.0 0.0 - 0.2 /100 WBC     EKG 11/24/19 NSR, RBBB    Assessment and Plan:   1. Syncope, unspecified syncope type  MCOT/event monitor to rule out arrhythmia  In the setting of ETOH, she says she plans to avoid ETOH or at least decrease her consumption    2. Essential hypertension  Controlled    3. Heart murmur  Echo    RV in 6 weeks      It has been a pleasure to participate in the care of this patient.  Patient was instructed to call the Heart and Valve Center with any questions, concerns, or  worsening symptoms.    *Please note that portions of this note were completed with a voice recognition program. Efforts were made to edit the dictations, but occasionally words are mistranscribed.

## 2019-12-03 NOTE — PROGRESS NOTES
[facility] Heart Monitor Documentation    Ana Alaniz  1955  [id number]  12/03/19    LYNDA Zhang    [] ZIO XT Patch  Model [id number] Prescribed for N/A Days    · Serial Number: (N + 9 Digits) N   · Apply-By Date on Box:   · USPS Tracking Number:   · USPS Tracking        [x] [employer] BodyGuardian MINI PLUS Mobile Cardiac Telemetry  Model BGMINIPLUS Prescribed for 30 Days    · Serial Number: (BGM + 7 Digits) [id number]  · Shipped-By Date on Box: 11/22/2019  · UPS Tracking Number: [id number]  · UPS Tracking      [] [employer] BodyGuardian MINI Holter Monitor  Model BGMINIEL Prescribed for N/A Days    · Serial Number: (7 Digits)   · Shipped-By Date on Box:   · UPS Tracking Number: [id number]  · UPS Tracking        This monitor was applied to the patient's chest and checked for proper functioning.  Ms. Ana Alaniz was instructed in the proper use of this monitor.  She was given the opportunity to ask questions and left the office with the device 's instruction manual.    Gilma Reardon MA, 2:31 PM, 12/03/19                  [facility]

## 2019-12-16 ENCOUNTER — HOSPITAL ENCOUNTER (OUTPATIENT)
Dept: CARDIOLOGY | Facility: HOSPITAL | Age: 64
Discharge: HOME | End: 2019-12-16
Admitting: NURSE PRACTITIONER

## 2019-12-16 VITALS — WEIGHT: 183 LBS | HEIGHT: 64 IN | BODY MASS INDEX: 31.24 KG/M2

## 2019-12-16 DIAGNOSIS — I10 ESSENTIAL HYPERTENSION: ICD-10-CM

## 2019-12-16 DIAGNOSIS — R55 SYNCOPE, UNSPECIFIED SYNCOPE TYPE: ICD-10-CM

## 2019-12-16 DIAGNOSIS — R01.1 HEART MURMUR: ICD-10-CM

## 2019-12-16 PROCEDURE — 93306 TTE W/DOPPLER COMPLETE: CPT | Performed by: INTERNAL MEDICINE

## 2019-12-16 PROCEDURE — 93306 TTE W/DOPPLER COMPLETE: CPT

## 2019-12-17 LAB
BH CV ECHO MEAS - AO MAX PG (FULL): 10.6 MMHG
BH CV ECHO MEAS - AO MAX PG: 17 MMHG
BH CV ECHO MEAS - AO MEAN PG (FULL): 6.5 MMHG
BH CV ECHO MEAS - AO MEAN PG: 8.5 MMHG
BH CV ECHO MEAS - AO ROOT AREA (BSA CORRECTED): 1.8
BH CV ECHO MEAS - AO ROOT AREA: 9.1 CM^2
BH CV ECHO MEAS - AO ROOT DIAM: 3.4 CM
BH CV ECHO MEAS - AO V2 MAX: 194 CM/SEC
BH CV ECHO MEAS - AO V2 MEAN: 135.5 CM/SEC
BH CV ECHO MEAS - AO V2 VTI: 38.9 CM
BH CV ECHO MEAS - AVA(I,A): 1.5 CM^2
BH CV ECHO MEAS - AVA(V,A): 1.5 CM^2
BH CV ECHO MEAS - AVA(V,D): 1.5 CM^2
BH CV ECHO MEAS - BSA(HAYCOCK): 2 M^2
BH CV ECHO MEAS - BSA: 1.9 M^2
BH CV ECHO MEAS - BZI_BMI: 31.4 KILOGRAMS/M^2
BH CV ECHO MEAS - BZI_METRIC_HEIGHT: 162.6 CM
BH CV ECHO MEAS - BZI_METRIC_WEIGHT: 83 KG
BH CV ECHO MEAS - EDV(CUBED): 54 ML
BH CV ECHO MEAS - EDV(MOD-SP2): 50 ML
BH CV ECHO MEAS - EDV(MOD-SP4): 46 ML
BH CV ECHO MEAS - EDV(TEICH): 61.2 ML
BH CV ECHO MEAS - EF(CUBED): 67.1 %
BH CV ECHO MEAS - EF(MOD-BP): 57 %
BH CV ECHO MEAS - EF(MOD-SP2): 52 %
BH CV ECHO MEAS - EF(MOD-SP4): 58.7 %
BH CV ECHO MEAS - EF(TEICH): 59.4 %
BH CV ECHO MEAS - ESV(CUBED): 17.8 ML
BH CV ECHO MEAS - ESV(MOD-SP2): 24 ML
BH CV ECHO MEAS - ESV(MOD-SP4): 19 ML
BH CV ECHO MEAS - ESV(TEICH): 24.8 ML
BH CV ECHO MEAS - FS: 31 %
BH CV ECHO MEAS - IVS/LVPW: 0.99
BH CV ECHO MEAS - IVSD: 0.99 CM
BH CV ECHO MEAS - LA DIMENSION: 3 CM
BH CV ECHO MEAS - LA/AO: 0.88
BH CV ECHO MEAS - LAD MAJOR: 5 CM
BH CV ECHO MEAS - LAT PEAK E' VEL: 11.5 CM/SEC
BH CV ECHO MEAS - LATERAL E/E' RATIO: 5.8
BH CV ECHO MEAS - LV DIASTOLIC VOL/BSA (35-75): 24.4 ML/M^2
BH CV ECHO MEAS - LV MASS(C)D: 115.4 GRAMS
BH CV ECHO MEAS - LV MASS(C)DI: 61.3 GRAMS/M^2
BH CV ECHO MEAS - LV MAX PG: 4.4 MMHG
BH CV ECHO MEAS - LV MEAN PG: 2 MMHG
BH CV ECHO MEAS - LV SYSTOLIC VOL/BSA (12-30): 10.1 ML/M^2
BH CV ECHO MEAS - LV V1 MAX: 105 CM/SEC
BH CV ECHO MEAS - LV V1 MEAN: 71.9 CM/SEC
BH CV ECHO MEAS - LV V1 VTI: 20.2 CM
BH CV ECHO MEAS - LVIDD: 3.8 CM
BH CV ECHO MEAS - LVIDS: 2.6 CM
BH CV ECHO MEAS - LVLD AP2: 7.2 CM
BH CV ECHO MEAS - LVLD AP4: 7 CM
BH CV ECHO MEAS - LVLS AP2: 5.8 CM
BH CV ECHO MEAS - LVLS AP4: 5.8 CM
BH CV ECHO MEAS - LVOT AREA (M): 2.8 CM^2
BH CV ECHO MEAS - LVOT AREA: 2.8 CM^2
BH CV ECHO MEAS - LVOT DIAM: 1.9 CM
BH CV ECHO MEAS - LVPWD: 1 CM
BH CV ECHO MEAS - MED PEAK E' VEL: 10 CM/SEC
BH CV ECHO MEAS - MEDIAL E/E' RATIO: 6.7
BH CV ECHO MEAS - MV A MAX VEL: 111 CM/SEC
BH CV ECHO MEAS - MV DEC TIME: 0.27 SEC
BH CV ECHO MEAS - MV E MAX VEL: 66.4 CM/SEC
BH CV ECHO MEAS - MV E/A: 0.6
BH CV ECHO MEAS - MV MAX PG: 6.3 MMHG
BH CV ECHO MEAS - MV MEAN PG: 2 MMHG
BH CV ECHO MEAS - MV V2 MAX: 125 CM/SEC
BH CV ECHO MEAS - MV V2 MEAN: 68.3 CM/SEC
BH CV ECHO MEAS - MV V2 VTI: 32.7 CM
BH CV ECHO MEAS - MVA(VTI): 1.8 CM^2
BH CV ECHO MEAS - PA ACC SLOPE: 1147 CM/SEC^2
BH CV ECHO MEAS - PA ACC TIME: 0.09 SEC
BH CV ECHO MEAS - PA MAX PG: 8 MMHG
BH CV ECHO MEAS - PA PR(ACCEL): 38.7 MMHG
BH CV ECHO MEAS - PA V2 MAX: 140.5 CM/SEC
BH CV ECHO MEAS - RAP SYSTOLE: 3 MMHG
BH CV ECHO MEAS - RVSP: 30 MMHG
BH CV ECHO MEAS - SI(AO): 187.5 ML/M^2
BH CV ECHO MEAS - SI(CUBED): 19.2 ML/M^2
BH CV ECHO MEAS - SI(LVOT): 30.4 ML/M^2
BH CV ECHO MEAS - SI(MOD-SP2): 13.8 ML/M^2
BH CV ECHO MEAS - SI(MOD-SP4): 14.3 ML/M^2
BH CV ECHO MEAS - SI(TEICH): 19.3 ML/M^2
BH CV ECHO MEAS - SV(AO): 353.2 ML
BH CV ECHO MEAS - SV(CUBED): 36.2 ML
BH CV ECHO MEAS - SV(LVOT): 57.3 ML
BH CV ECHO MEAS - SV(MOD-SP2): 26 ML
BH CV ECHO MEAS - SV(MOD-SP4): 27 ML
BH CV ECHO MEAS - SV(TEICH): 36.3 ML
BH CV ECHO MEAS - TAPSE (>1.6): 2.1 CM2
BH CV ECHO MEAS - TR MAX PG: 27 MMHG
BH CV ECHO MEAS - TR MAX VEL: 258 CM/SEC
BH CV ECHO MEASUREMENTS AVERAGE E/E' RATIO: 6.18
BH CV VAS BP LEFT ARM: NORMAL MMHG
BH CV XLRA - RV BASE: 3.5 CM
BH CV XLRA - RV LENGTH: 7.1 CM
BH CV XLRA - RV MID: 3.3 CM
BH CV XLRA - TDI S': 14.3 CM/SEC
LEFT ATRIUM VOLUME INDEX: 22.3 ML/M^2
LEFT ATRIUM VOLUME: 42 ML
LV EF 2D ECHO EST: 60 %
MAXIMAL PREDICTED HEART RATE: 154 BPM
STRESS TARGET HR: 131 BPM

## 2020-01-01 PROCEDURE — 93228 REMOTE 30 DAY ECG REV/REPORT: CPT | Performed by: INTERNAL MEDICINE

## 2020-01-10 ENCOUNTER — OFFICE VISIT (OUTPATIENT)
Dept: CARDIOLOGY | Facility: HOSPITAL | Age: 65
End: 2020-01-10

## 2020-01-10 VITALS
RESPIRATION RATE: 18 BRPM | SYSTOLIC BLOOD PRESSURE: 122 MMHG | BODY MASS INDEX: 30.9 KG/M2 | DIASTOLIC BLOOD PRESSURE: 72 MMHG | HEART RATE: 81 BPM | HEIGHT: 64 IN | WEIGHT: 181 LBS | OXYGEN SATURATION: 94 % | TEMPERATURE: 96.9 F

## 2020-01-10 DIAGNOSIS — R55 SYNCOPE, UNSPECIFIED SYNCOPE TYPE: Primary | ICD-10-CM

## 2020-01-10 DIAGNOSIS — I10 ESSENTIAL HYPERTENSION: ICD-10-CM

## 2020-01-10 PROCEDURE — 99213 OFFICE O/P EST LOW 20 MIN: CPT | Performed by: NURSE PRACTITIONER

## 2020-01-10 NOTE — PROGRESS NOTES
[facility]  Heart and Valve Center    01/10/2020       Ana Alaniz  [address] [facility] [address]  [unfilled]    1955    Tom Bernal MD    Ana Alaniz is a 65 y.o. female.      Subjective:     Chief Complaint:  Loss of Consciousness and Follow-up       HPI   Patient is a 65-year-old female with past medical history significant for severe sleep apnea and hypertension who presents to the [facility]ve Center at the request of Dr. Field for evaluation of syncope.  Patient awoke on her laudry room floor and does not recall precipitating event.  She admits that she probably had too much too drink.  Was drinking bourbon and water at the time. Ethanol levels were 209.  Typically drinks 3 or more drinks a night.   She wore MCOT for close to 30 days which showed no arrhythmias. Echo showed normal left ventricular systolic function no significant valve disease.  Since the ED visit she has cut down her alcohol intake and increase her fluids.  She has had no further symptoms.  Denies shortness of breath, lightheadedness, palpitations or chest pain.  She reports she checks her blood pressure at home and it usually is well controlled.  Patient Active Problem List   Diagnosis   • Severe obstructive sleep apnea   • Hypertension   • Degenerative joint disease   • Chronic rhinitis   • Hyperlipidemia   • Gastroesophageal reflux disease   • Medicare welcome visit       Past Medical History:   Diagnosis Date   • Acute pharyngitis    • Allergy    • Arrhythmia    • Degenerative arthritis    • Degenerative joint disease 11/8/2016   • Epistaxis    • Glaucoma    • Hypertension    • Positive KENY (antinuclear antibody)    • Right foot pain    • Sinusitis    • Sleep apnea    • Transfusion history    • URI (upper respiratory infection)        Past Surgical History:   Procedure Laterality Date   • APPENDECTOMY     • CATARACT EXTRACTION, BILATERAL     • OTHER SURGICAL HISTORY      LT Knee scar excision   •  REPLACEMENT TOTAL KNEE Right    • TOTAL KNEE ARTHROPLASTY Left        Family History   Problem Relation Age of Onset   • Heart disease Mother    • Hypertension Mother    • Kidney disease Mother    • Clotting disorder Mother    • Heart attack Mother    • Hypertension Father    • Stroke Father    • Coronary artery disease Father    • Clotting disorder Father    • Cancer Other    • Hypertension Brother    • Cancer Maternal Grandmother    • No Known Problems Maternal Grandfather    • No Known Problems Paternal Grandmother    • No Known Problems Paternal Grandfather    • No Known Problems Brother    • Stroke Brother    • Heart disease Brother    • No Known Problems Brother    • No Known Problems Brother        Social History     Socioeconomic History   • Marital status:      Spouse name: Not on file   • Number of children: Not on file   • Years of education: Not on file   • Highest education level: Not on file   Tobacco Use   • Smoking status: Never Smoker   • Smokeless tobacco: Never Used   Substance and Sexual Activity   • Alcohol use: Yes     Alcohol/week: 2.0 - 4.0 standard drinks     Types: 1 - 2 Glasses of wine, 1 - 2 Cans of beer per week     Frequency: 4 or more times a week     Drinks per session: 1 or 2     Comment: nightly   • Drug use: No   • Sexual activity: Defer   Social History Narrative    Caffeine: 1/2 diet cola daily, 1-2 cups coffee daily       Allergies   Allergen Reactions   • Tetanus Toxoids Other (See Comments)     Unknown reaction   • Ace Inhibitors Cough         Current Outpatient Medications:   •  amLODIPine (NORVASC) 5 MG tablet, Take 1 tablet by mouth Daily., Disp: 90 tablet, Rfl: 3  •  atorvastatin (LIPITOR) 10 MG tablet, Take 1 tablet by mouth Daily., Disp: 90 tablet, Rfl: 3  •  fexofenadine (ALLEGRA) 180 MG tablet, Take 180 mg by mouth Daily., Disp: , Rfl:   •  Flaxseed, Linseed, (FLAX SEED OIL) 1300 MG capsule, Take 1 tablet by mouth Daily., Disp: , Rfl:   •   Glucosamine-Chondroit-Vit C-Mn (GLUCOSAMINE CHONDR 1500 COMPLX) capsule, Take 1,500 mg by mouth 2 (Two) Times a Day., Disp: , Rfl:   •  hydrochlorothiazide (HYDRODIURIL) 25 MG tablet, Take 1 tablet by mouth Daily., Disp: 90 tablet, Rfl: 3  •  losartan (COZAAR) 50 MG tablet, Take 1 tablet by mouth Daily., Disp: 90 tablet, Rfl: 3  •  Lutein-Zeaxanthin 25-5 MG capsule, Take 1 tablet by mouth Daily., Disp: , Rfl:   •  Multiple Vitamins-Minerals (PRESERVISION AREDS 2 PO), Take  by mouth Daily., Disp: , Rfl:   •  nitroglycerin (NITROSTAT) 0.4 MG SL tablet, Place 1 tablet under the tongue Every 5 (Five) Minutes As Needed for Chest Pain. Take no more than 3 doses in 15 minutes., Disp: 20 tablet, Rfl: 0  •  Omega-3 Fatty Acids (OMEGA-3 FISH OIL) 500 MG capsule, Take 500 mg by mouth Daily., Disp: , Rfl:   •  omeprazole (priLOSEC) 40 MG capsule, Take 1 capsule by mouth Daily., Disp: 90 capsule, Rfl: 3    The following portions of the patient's history were reviewed today and updated as appropriate: allergies, current medications, past family history, past medical history, past social history, past surgical history and problem list     Review of Systems   Constitution: Negative for chills and fever.   Eyes: Negative.    Cardiovascular: Negative for chest pain, claudication, cyanosis, dyspnea on exertion, irregular heartbeat, leg swelling, near-syncope, orthopnea, palpitations, paroxysmal nocturnal dyspnea and syncope.   Respiratory: Negative for cough, shortness of breath and snoring.    Endocrine: Negative.    Hematologic/Lymphatic: Does not bruise/bleed easily.   Skin: Negative for poor wound healing.   Musculoskeletal: Negative.    Gastrointestinal: Negative for abdominal pain, heartburn, hematemesis, melena, nausea and vomiting.   Genitourinary: Negative.  Negative for hematuria.   Neurological: Positive for headaches.   Psychiatric/Behavioral: Negative.    Allergic/Immunologic: Negative.        Objective:     Vitals:     "01/10/20 1255   BP: 122/72   BP Location: Right arm   Patient Position: Sitting   Cuff Size: Adult   Pulse: 81   Resp: 18   Temp: 96.9 °F (36.1 °C)   TempSrc: Temporal   SpO2: 94%   Weight: 82.1 kg (181 lb)   Height: 162.6 cm (64\")       Body mass index is 31.07 kg/m².    Physical Exam   Constitutional: She is oriented to person, place, and time. She appears well-developed and well-nourished. No distress.   HENT:   Head: Normocephalic.   Eyes: Pupils are equal, round, and reactive to light. Conjunctivae are normal.   Neck: Neck supple. No JVD present. No thyromegaly present.   Cardiovascular: Normal rate, regular rhythm and intact distal pulses. Exam reveals no gallop and no friction rub.   Murmur (BEVERLEY II) heard.  Pulmonary/Chest: Effort normal and breath sounds normal. No respiratory distress. She has no wheezes. She has no rales. She exhibits no tenderness.   Abdominal: Soft. Bowel sounds are normal.   Musculoskeletal: Normal range of motion. She exhibits no edema.   Neurological: She is alert and oriented to person, place, and time.   Large contusion/hematoma on left occipital region   Skin: Skin is warm and dry.   Psychiatric: She has a normal mood and affect. Her behavior is normal. Thought content normal.   Vitals reviewed.      Lab and Diagnostic Review:  Echo 12/17/19  · Estimated EF = 60%.  · Aortic valve maximum pressure gradient is 17.0 mmHg.  · Left ventricular diastolic dysfunction (grade I) consistent with impaired relaxation.  · Calculated right ventricular systolic pressure from tricuspid regurgitation is 30 mmHg.    M cot worn for close to 28 days showed an average heart rate of 68, minimum heart of 45 and max heart of 101 bpm.  No significant arrhythmias were noted.  There were rare PACs and PVCs    Assessment and Plan:   1. Syncope, unspecified syncope type  1 syncopal episode in the setting of alcohol use and probable dehydration.  No arrhythmias on MCOT.  I have encouraged her to continue to " increase her water intake and to cut down on alcohol to 1 drink or less daily.  Advised her to call me if she starts having increase dizziness and will consider cutting back on her hydrochlorothiazide    2. Essential hypertension  Controlled    Ronan stout with Dr. Hung STOUT with [facility]      It has been a pleasure to participate in the care of this patient.  Patient was instructed to call the Heart and Valve Center with any questions, concerns, or worsening symptoms.    *Please note that portions of this note were completed with a voice recognition program. Efforts were made to edit the dictations, but occasionally words are mistranscribed.

## 2020-03-12 ENCOUNTER — TELEPHONE (OUTPATIENT)
Dept: FAMILY MEDICINE CLINIC | Facility: CLINIC | Age: 65
End: 2020-03-12

## 2020-03-12 DIAGNOSIS — K21.9 GASTROESOPHAGEAL REFLUX DISEASE WITHOUT ESOPHAGITIS: ICD-10-CM

## 2020-03-12 DIAGNOSIS — E78.2 MIXED HYPERLIPIDEMIA: ICD-10-CM

## 2020-03-12 DIAGNOSIS — I10 ESSENTIAL HYPERTENSION: ICD-10-CM

## 2020-03-12 RX ORDER — OMEPRAZOLE 40 MG/1
40 CAPSULE, DELAYED RELEASE ORAL DAILY
Qty: 90 CAPSULE | Refills: 3 | Status: SHIPPED | OUTPATIENT
Start: 2020-03-12 | End: 2021-03-17 | Stop reason: SDUPTHER

## 2020-03-12 RX ORDER — LOSARTAN POTASSIUM 50 MG/1
50 TABLET ORAL DAILY
Qty: 90 TABLET | Refills: 3 | Status: SHIPPED | OUTPATIENT
Start: 2020-03-12 | End: 2021-03-17 | Stop reason: SDUPTHER

## 2020-03-12 RX ORDER — HYDROCHLOROTHIAZIDE 25 MG/1
25 TABLET ORAL DAILY
Qty: 90 TABLET | Refills: 3 | Status: SHIPPED | OUTPATIENT
Start: 2020-03-12 | End: 2021-03-17 | Stop reason: SDUPTHER

## 2020-03-12 RX ORDER — AMLODIPINE BESYLATE 5 MG/1
5 TABLET ORAL DAILY
Qty: 90 TABLET | Refills: 3 | Status: SHIPPED | OUTPATIENT
Start: 2020-03-12 | End: 2021-03-17 | Stop reason: SDUPTHER

## 2020-03-12 RX ORDER — ATORVASTATIN CALCIUM 10 MG/1
10 TABLET, FILM COATED ORAL DAILY
Qty: 90 TABLET | Refills: 3 | Status: SHIPPED | OUTPATIENT
Start: 2020-03-12 | End: 2021-03-17 | Stop reason: SDUPTHER

## 2020-03-30 ENCOUNTER — TELEMEDICINE (OUTPATIENT)
Dept: FAMILY MEDICINE CLINIC | Facility: CLINIC | Age: 65
End: 2020-03-30

## 2020-03-30 ENCOUNTER — TELEPHONE (OUTPATIENT)
Dept: FAMILY MEDICINE CLINIC | Facility: CLINIC | Age: 65
End: 2020-03-30

## 2020-03-30 DIAGNOSIS — J01.10 SUBACUTE FRONTAL SINUSITIS: Primary | ICD-10-CM

## 2020-03-30 PROCEDURE — 99213 OFFICE O/P EST LOW 20 MIN: CPT | Performed by: FAMILY MEDICINE

## 2020-03-30 RX ORDER — AMOXICILLIN AND CLAVULANATE POTASSIUM 875; 125 MG/1; MG/1
1 TABLET, FILM COATED ORAL 2 TIMES DAILY
Qty: 20 TABLET | Refills: 0 | Status: SHIPPED | OUTPATIENT
Start: 2020-03-30 | End: 2020-07-16

## 2020-03-30 NOTE — TELEPHONE ENCOUNTER
PATIENT CALLED STATING SHE HAS SYMPTOMS ASSOCIATED WITH WHAT SHE BELIEVES MIGHT BE A SINUS INFECTION.  FOR THE PAST TWO WEEKS SHE HAS HAD A HEADACHE ACROSS HER EYE AND FOREHEAD.  AND HAS PAIN IN HER FACE UNDER EYES, AND NOW HAS A SMALL EARACHE. SHE HAS BEEN TAKING MUCINEX AND TYLENOL AND IT HAS NOT MADE THE PROBLEMS GO AWAY.      PHARMACY VERIFIED AS:  [facility] [phone] [phone] [facility]       PLEASE CALL PATIENT AND ADVISE    [phone]

## 2020-03-30 NOTE — PROGRESS NOTES
Subjective   Ana Alaniz is a 65 y.o. female seen today for Sinusitis.     I spoke with the patient by means of a video visit.  She tells me that over the last 7 to 10 days she has been experiencing bilateral pain and pressure in the frontal sinus area and in the maxillary sinus area.  She also notes increased discomfort and bending over.  She has had some nasal stuffiness and postnasal drip.  No sneezing or rhinorrhea.  No cough, fever cough chills chest pain or shortness of breath.       The following portions of the patient's history were reviewed and updated as appropriate: allergies, current medications, past social history and problem list.    Review of Systems   Constitutional: Negative for activity change, appetite change, chills, diaphoresis, fatigue and fever.   HENT: Positive for congestion, postnasal drip, sinus pressure and sinus pain. Negative for ear pain, facial swelling, hearing loss, mouth sores, nosebleeds, rhinorrhea, sneezing, sore throat, tinnitus, trouble swallowing and voice change.    Eyes: Negative for visual disturbance.   Respiratory: Negative for cough and shortness of breath.    Cardiovascular: Negative for chest pain.       Objective   There were no vitals taken for this visit.  Physical Exam   Constitutional: She is oriented to person, place, and time. She appears well-developed and well-nourished. No distress.   Eyes: Pupils are equal, round, and reactive to light. Conjunctivae are normal.   Pulmonary/Chest: Effort normal.   Neurological: She is alert and oriented to person, place, and time.       Assessment/Plan   Problem List Items Addressed This Visit     None      Visit Diagnoses     Subacute frontal sinusitis    -  Primary      The patient is advised that she has frontal sinusitis and maxillary sinusitis probably as a complication of seasonal allergies.    We will have her use Augmentin 875 mg twice a day for 10 days.  Also use Afrin nasal spray 2 to 3 sprays in each nostril  twice a day for 3 or 4 days.  5 minutes after each use of Afrin use also saline nasal spray 3 sprays in each nostril twice a day.  Stop using the Afrin after 4 days.  Switch then at that point to Flonase nasal spray 2 sprays each nostril once a day.

## 2020-03-30 NOTE — PATIENT INSTRUCTIONS
I told the patient to use Afrin nasal spray 2 sprays each nostril twice a day for 3 or 4 days.  After each use of Afrin use saline nasal spray 3 sprays each nostril twice a day.  Stop using the Afrin after 3 or 4 days.  Switch at that time to Flonase nasal spray 2 sprays each nostril once a day.  Also take Augmentin 875 mg twice a day for 10 days.

## 2020-07-16 ENCOUNTER — TELEMEDICINE (OUTPATIENT)
Dept: SLEEP MEDICINE | Facility: HOSPITAL | Age: 65
End: 2020-07-16

## 2020-07-16 VITALS — HEIGHT: 64 IN | WEIGHT: 180 LBS | BODY MASS INDEX: 30.73 KG/M2

## 2020-07-16 DIAGNOSIS — G47.33 OSA (OBSTRUCTIVE SLEEP APNEA): Primary | ICD-10-CM

## 2020-07-16 PROCEDURE — 99212 OFFICE O/P EST SF 10 MIN: CPT | Performed by: NURSE PRACTITIONER

## 2020-07-16 NOTE — PROGRESS NOTES
Chief Complaint:   Chief Complaint   Patient presents with   • Follow-up       HPI:    Ana Alaniz is a 65 y.o. female here for follow-up of sleep apnea.  Patient was last seen 7/16/2019.  Patient states she is doing well with CPAP therapy.  Patient is sleeping 7 hours nightly and does feel refreshed upon awakening.  Patient will go to sleep within 20 to 30 minutes and does not get up during the night.  Patient has an [place] score of 1/24.  Patient has no concerns or complaints today with CPAP therapy and wishes to continue.    Past Medical History:   Diagnosis Date   • Acute pharyngitis    • Allergy    • Arrhythmia    • Degenerative arthritis    • Degenerative joint disease 11/8/2016   • Epistaxis    • Glaucoma    • Hypertension    • Positive KENY (antinuclear antibody)    • Right foot pain    • Sinusitis    • Sleep apnea    • Transfusion history    • URI (upper respiratory infection)          Current medications are:   Current Outpatient Medications:   •  amLODIPine (NORVASC) 5 MG tablet, Take 1 tablet by mouth Daily., Disp: 90 tablet, Rfl: 3  •  atorvastatin (LIPITOR) 10 MG tablet, Take 1 tablet by mouth Daily., Disp: 90 tablet, Rfl: 3  •  fexofenadine (ALLEGRA) 180 MG tablet, Take 180 mg by mouth Daily., Disp: , Rfl:   •  Flaxseed, Linseed, (FLAX SEED OIL) 1300 MG capsule, Take 1 tablet by mouth Daily., Disp: , Rfl:   •  Glucosamine-Chondroit-Vit C-Mn (GLUCOSAMINE CHONDR 1500 COMPLX) capsule, Take 1,500 mg by mouth 2 (Two) Times a Day., Disp: , Rfl:   •  hydroCHLOROthiazide (HYDRODIURIL) 25 MG tablet, Take 1 tablet by mouth Daily., Disp: 90 tablet, Rfl: 3  •  losartan (COZAAR) 50 MG tablet, Take 1 tablet by mouth Daily., Disp: 90 tablet, Rfl: 3  •  Lutein-Zeaxanthin 25-5 MG capsule, Take 1 tablet by mouth Daily., Disp: , Rfl:   •  Multiple Vitamins-Minerals (PRESERVISION AREDS 2 PO), Take  by mouth Daily., Disp: , Rfl:   •  nitroglycerin (NITROSTAT) 0.4 MG SL tablet, Place 1 tablet under the tongue Every 5  (Five) Minutes As Needed for Chest Pain. Take no more than 3 doses in 15 minutes., Disp: 20 tablet, Rfl: 0  •  Omega-3 Fatty Acids (OMEGA-3 FISH OIL) 500 MG capsule, Take 500 mg by mouth Daily., Disp: , Rfl:   •  omeprazole (priLOSEC) 40 MG capsule, Take 1 capsule by mouth Daily., Disp: 90 capsule, Rfl: 3.      The patient's relevant past medical, surgical, family and social history were reviewed and updated in Epic as appropriate.       Review of Systems   Eyes: Positive for visual disturbance.   Respiratory: Positive for apnea.    Cardiovascular: Positive for palpitations.   Gastrointestinal:        Heartburn   Musculoskeletal: Positive for arthralgias and joint swelling.   Psychiatric/Behavioral: Positive for sleep disturbance.   All other systems reviewed and are negative.        Objective:    Physical Exam   Constitutional: She is oriented to person, place, and time. She appears well-developed and well-nourished.   HENT:   Head: Normocephalic and atraumatic.   Class 1 airway   Neck: No tracheal deviation present.   Pulmonary/Chest: Effort normal.   Neurological: She is alert and oriented to person, place, and time.   Skin: No erythema. No pallor.   Psychiatric: She has a normal mood and affect. Her behavior is normal. Judgment and thought content normal.   29/31  Days of use.  Greater than 4-hour use 83.990% pressure 9.4 AHI 1.7.  Settings 8 to 18 cm H2O.      ASSESSMENT/PLAN    Ana was seen today for follow-up.    Diagnoses and all orders for this visit:    LUKASZ (obstructive sleep apnea)  -     CPAP Therapy            1. Counseled patient regarding multimodal approach with healthy nutrition, healthy sleep, regular physical activity, social activities, counseling, and medications. Encouraged to practice lateral  sleep position. Avoid alcohol and sedatives close to bedtime.  2.   Refill supplies x1 year.  Return to clinic 1 year or sooner if symptoms warrant.  Patient did give verbal consent we move forward with  video visit.  I have reviewed the results of my evaluation and impression and discussed my recommendations in detail with the patient.      Signed by  Dia Ramey, APRN    July 16, 2020      CC: Tom Bernal MD          No ref. provider found

## 2021-03-17 ENCOUNTER — TELEMEDICINE (OUTPATIENT)
Dept: FAMILY MEDICINE CLINIC | Facility: CLINIC | Age: 66
End: 2021-03-17

## 2021-03-17 DIAGNOSIS — K21.9 GASTROESOPHAGEAL REFLUX DISEASE WITHOUT ESOPHAGITIS: ICD-10-CM

## 2021-03-17 DIAGNOSIS — E78.2 MIXED HYPERLIPIDEMIA: ICD-10-CM

## 2021-03-17 DIAGNOSIS — I10 ESSENTIAL HYPERTENSION: ICD-10-CM

## 2021-03-17 PROCEDURE — G0439 PPPS, SUBSEQ VISIT: HCPCS | Performed by: FAMILY MEDICINE

## 2021-03-17 RX ORDER — AMLODIPINE BESYLATE 5 MG/1
5 TABLET ORAL DAILY
Qty: 90 TABLET | Refills: 3 | Status: SHIPPED | OUTPATIENT
Start: 2021-03-17 | End: 2022-02-10 | Stop reason: SDUPTHER

## 2021-03-17 RX ORDER — HYDROCHLOROTHIAZIDE 25 MG/1
25 TABLET ORAL DAILY
Qty: 90 TABLET | Refills: 3 | Status: SHIPPED | OUTPATIENT
Start: 2021-03-17 | End: 2022-02-10 | Stop reason: SDUPTHER

## 2021-03-17 RX ORDER — ATORVASTATIN CALCIUM 10 MG/1
10 TABLET, FILM COATED ORAL DAILY
Qty: 90 TABLET | Refills: 3 | Status: SHIPPED | OUTPATIENT
Start: 2021-03-17 | End: 2022-02-10 | Stop reason: SDUPTHER

## 2021-03-17 RX ORDER — LOSARTAN POTASSIUM 50 MG/1
50 TABLET ORAL DAILY
Qty: 90 TABLET | Refills: 3 | Status: SHIPPED | OUTPATIENT
Start: 2021-03-17 | End: 2022-02-10 | Stop reason: SDUPTHER

## 2021-03-17 RX ORDER — OMEPRAZOLE 40 MG/1
40 CAPSULE, DELAYED RELEASE ORAL DAILY
Qty: 90 CAPSULE | Refills: 3 | Status: SHIPPED | OUTPATIENT
Start: 2021-03-17 | End: 2022-02-10 | Stop reason: SDUPTHER

## 2021-03-17 NOTE — PROGRESS NOTES
The ABCs of the Annual Wellness Visit  Subsequent Medicare Wellness Visit    The patient is here today for a subsequent Medicare wellness visit.  Her last such visit was on August 14, 2018.  She is doing well.  No current active complaints.  You have chosen to receive care through a telehealth visit.  Do you consent to use a video/audio connection for your medical care today? Yes    Chief Complaint   Patient presents with   • Medicare Wellness-subsequent   • Med Refill       Subjective   History of Present Illness:  Ana Alaniz is a 66 y.o. female who presents for a Subsequent Medicare Wellness Visit.    HEALTH RISK ASSESSMENT    Recent Hospitalizations:  No hospitalization(s) within the last year.    Current Medical Providers:  Patient Care Team:  Tom Bernal MD as PCP - General (Family Medicine)   Dr. Quinones, cardiology.  Dr. Swift, gynecology.  Dr. Moses, orthopedics  Dr. Pantoja, ophthalmology  Dr. Clinton, dentistry  Dr. Liang, sleep medicine    Smoking Status:  Social History     Tobacco Use   Smoking Status Never Smoker   Smokeless Tobacco Never Used       Alcohol Consumption:  Social History     Substance and Sexual Activity   Alcohol Use Yes   • Alcohol/week: 2.0 - 4.0 standard drinks   • Types: 1 - 2 Glasses of wine, 1 - 2 Cans of beer per week    Comment: nightly       Depression Screen:   PHQ-2/PHQ-9 Depression Screening 3/17/2021   Little interest or pleasure in doing things 0   Feeling down, depressed, or hopeless 0   Total Score 0       Fall Risk Screen:  MICHAEL Fall Risk Assessment was completed, and patient is at LOW risk for falls.Assessment completed on:3/17/2021    Health Habits and Functional and Cognitive Screening:  Functional & Cognitive Status 8/14/2018   Do you have difficulty preparing food and eating? No   Do you have difficulty bathing yourself, getting dressed or grooming yourself? No   Do you have difficulty using the toilet? No   Do you have difficulty moving around from  place to place? No   Do you have trouble with steps or getting out of a bed or a chair? No   Current Diet Well Balanced Diet   Dental Exam Up to date   Eye Exam Up to date   Exercise (times per week) 3 times per week   Current Exercise Activities Include Cardiovasular Workout on Exercise Equipment   Do you need help using the phone?  No   Are you deaf or do you have serious difficulty hearing?  No   Do you need help with transportation? No   Do you need help shopping? No   Do you need help preparing meals?  No   Do you need help with housework?  No   Do you need help with laundry? No   Do you need help taking your medications? No   Do you need help managing money? No   Do you ever drive or ride in a car without wearing a seat belt? No   Have you felt unusual stress, anger or loneliness in the last month? No   Who do you live with? Spouse   If you need help, do you have trouble finding someone available to you? No   Have you been bothered in the last four weeks by sexual problems? No   Do you have difficulty concentrating, remembering or making decisions? No         Does the patient have evidence of cognitive impairment? No    Asprin use counseling:Does not need ASA (and currently is not on it)         Age-appropriate Screening Schedule:  Refer to the list below for future screening recommendations based on patient's age, sex and/or medical conditions. Orders for these recommended tests are listed in the plan section. The patient has been provided with a written plan.    Health Maintenance   Topic Date Due   • MAMMOGRAM  Never done   • PAP SMEAR  Never done   • ZOSTER VACCINE (2 of 3) 10/10/2019   • LIPID PANEL  05/17/2020   • DXA SCAN  08/16/2020   • COLONOSCOPY  09/07/2028   • INFLUENZA VACCINE  Completed   • TDAP/TD VACCINES  Discontinued          The following portions of the patient's history were reviewed and updated as appropriate: allergies, current medications, past family history, past medical history, past  social history, past surgical history and problem list.    Outpatient Medications Prior to Visit   Medication Sig Dispense Refill   • fexofenadine (ALLEGRA) 180 MG tablet Take 180 mg by mouth Daily.     • Flaxseed, Linseed, (FLAX SEED OIL) 1300 MG capsule Take 1 tablet by mouth Daily.     • Glucosamine-Chondroit-Vit C-Mn (GLUCOSAMINE CHONDR 1500 COMPLX) capsule Take 1,500 mg by mouth 2 (Two) Times a Day.     • Lutein-Zeaxanthin 25-5 MG capsule Take 1 tablet by mouth Daily.     • Multiple Vitamins-Minerals (PRESERVISION AREDS 2 PO) Take  by mouth Daily.     • Omega-3 Fatty Acids (OMEGA-3 FISH OIL) 500 MG capsule Take 500 mg by mouth Daily.     • amLODIPine (NORVASC) 5 MG tablet Take 1 tablet by mouth Daily. 90 tablet 3   • atorvastatin (LIPITOR) 10 MG tablet Take 1 tablet by mouth Daily. 90 tablet 3   • hydroCHLOROthiazide (HYDRODIURIL) 25 MG tablet Take 1 tablet by mouth Daily. 90 tablet 3   • losartan (COZAAR) 50 MG tablet Take 1 tablet by mouth Daily. 90 tablet 3   • nitroglycerin (NITROSTAT) 0.4 MG SL tablet Place 1 tablet under the tongue Every 5 (Five) Minutes As Needed for Chest Pain. Take no more than 3 doses in 15 minutes. 20 tablet 0   • omeprazole (priLOSEC) 40 MG capsule Take 1 capsule by mouth Daily. 90 capsule 3     No facility-administered medications prior to visit.       Patient Active Problem List   Diagnosis   • Severe obstructive sleep apnea   • Hypertension   • Degenerative joint disease   • Chronic rhinitis   • Hyperlipidemia   • Gastroesophageal reflux disease   • Medicare welcome visit       Advanced Care Planning:  ACP discussion was held with the patient during this visit. Patient does not have an advance directive, information provided.    Review of Systems    Compared to one year ago, the patient feels her physical health is the same.  Compared to one year ago, the patient feels her mental health is the same.    Reviewed chart for potential of high risk medication in the elderly:  yes  Reviewed chart for potential of harmful drug interactions in the elderly:yes    Objective       There were no vitals filed for this visit.    There is no height or weight on file to calculate BMI.  Discussed the patient's BMI with her. The BMI is in the acceptable range.    Physical Exam          Assessment/Plan   Medicare Risks and Personalized Health Plan  CMS Preventative Services Quick Reference  Advance Directive Discussion  Alcohol Misuse  Cardiovascular risk  Fall Risk  Immunizations Discussed/Encouraged (specific immunizations; Pneumococcal 23 )    The above risks/problems have been discussed with the patient.  Pertinent information has been shared with the patient in the After Visit Summary.  Follow up plans and orders are seen below in the Assessment/Plan Section.    Diagnoses and all orders for this visit:    1. Essential hypertension  -     amLODIPine (NORVASC) 5 MG tablet; Take 1 tablet by mouth Daily.  Dispense: 90 tablet; Refill: 3  -     hydroCHLOROthiazide (HYDRODIURIL) 25 MG tablet; Take 1 tablet by mouth Daily.  Dispense: 90 tablet; Refill: 3  -     losartan (COZAAR) 50 MG tablet; Take 1 tablet by mouth Daily.  Dispense: 90 tablet; Refill: 3    2. Gastroesophageal reflux disease without esophagitis  -     omeprazole (priLOSEC) 40 MG capsule; Take 1 capsule by mouth Daily.  Dispense: 90 capsule; Refill: 3    3. Mixed hyperlipidemia  -     atorvastatin (Lipitor) 10 MG tablet; Take 1 tablet by mouth Daily.  Dispense: 90 tablet; Refill: 3      Follow Up:  Return in about 6 months (around 9/17/2021) for Recheck.  She needs to have her blood pressure checked at that time and also needs to have fasting lab work obtained.  Patient also had to cancel her eye appointment in 2020 and needs to reschedule that now in the new year.  She is walking a several days a week and is encouraged to do that on a daily basis.    The patient is deficient in getting a pneumococcal 23 vaccine.  She is advised to get  that at her next visit.  She will be getting her second COVID-19 vaccine on March 30.    Refills on her medications were given today.    We did not discuss preventive testing measures.  She needs to have a mammogram done.  She also needs to have reviewed done on her last colonoscopy.  She no longer obtains gynecologic checks through Dr. Pérez since she is menopausal.    We did conduct a cardiovascular risk assessment.  Her 10-year risk is 10.6% for a cardiovascular event.    A mini cognitive examination was done and she was able to recall her name and address after naming 20 animals.    An After Visit Summary and PPPS were given to the patient.

## 2021-07-16 ENCOUNTER — OFFICE VISIT (OUTPATIENT)
Dept: SLEEP MEDICINE | Facility: HOSPITAL | Age: 66
End: 2021-07-16

## 2021-07-16 VITALS
OXYGEN SATURATION: 97 % | HEIGHT: 64 IN | BODY MASS INDEX: 30.8 KG/M2 | SYSTOLIC BLOOD PRESSURE: 114 MMHG | DIASTOLIC BLOOD PRESSURE: 75 MMHG | WEIGHT: 180.4 LBS | HEART RATE: 82 BPM

## 2021-07-16 DIAGNOSIS — G47.33 OSA (OBSTRUCTIVE SLEEP APNEA): Primary | ICD-10-CM

## 2021-07-16 PROCEDURE — 99213 OFFICE O/P EST LOW 20 MIN: CPT | Performed by: NURSE PRACTITIONER

## 2021-07-16 NOTE — PROGRESS NOTES
Chief Complaint:   Chief Complaint   Patient presents with   • Follow-up       HPI:    Ana Alaniz is a 66 y.o. female here for follow-up of sleep apnea.  Patient has a history of hyperlipidemia, hypertension, GERD, DJD, and severe sleep apnea.  Patient was last seen 7/16/2020.  Patient is sleeping 7 to 9 hours nightly and does feel refreshed upon awakening.  Patient will go to sleep within 20 to 30 minutes and does not get up during the night.  Patient has an [place] score of 3/24.  Patient has no concerns or complaints and wishes to continue with CPAP therapy.        Current medications are:   Current Outpatient Medications:   •  amLODIPine (NORVASC) 5 MG tablet, Take 1 tablet by mouth Daily., Disp: 90 tablet, Rfl: 3  •  atorvastatin (Lipitor) 10 MG tablet, Take 1 tablet by mouth Daily., Disp: 90 tablet, Rfl: 3  •  fexofenadine (ALLEGRA) 180 MG tablet, Take 180 mg by mouth Daily., Disp: , Rfl:   •  Flaxseed, Linseed, (FLAX SEED OIL) 1300 MG capsule, Take 1 tablet by mouth Daily., Disp: , Rfl:   •  Glucosamine-Chondroit-Vit C-Mn (GLUCOSAMINE CHONDR 1500 COMPLX) capsule, Take 1,500 mg by mouth 2 (Two) Times a Day., Disp: , Rfl:   •  hydroCHLOROthiazide (HYDRODIURIL) 25 MG tablet, Take 1 tablet by mouth Daily., Disp: 90 tablet, Rfl: 3  •  losartan (COZAAR) 50 MG tablet, Take 1 tablet by mouth Daily., Disp: 90 tablet, Rfl: 3  •  Lutein-Zeaxanthin 25-5 MG capsule, Take 1 tablet by mouth Daily., Disp: , Rfl:   •  Multiple Vitamins-Minerals (PRESERVISION AREDS 2 PO), Take  by mouth Daily., Disp: , Rfl:   •  Omega-3 Fatty Acids (OMEGA-3 FISH OIL) 500 MG capsule, Take 500 mg by mouth Daily., Disp: , Rfl:   •  omeprazole (priLOSEC) 40 MG capsule, Take 1 capsule by mouth Daily., Disp: 90 capsule, Rfl: 3.      The patient's relevant past medical, surgical, family and social history were reviewed and updated in Epic as appropriate.       Review of Systems   Eyes: Positive for visual disturbance.   Respiratory: Positive  "for apnea.    Cardiovascular: Positive for palpitations.   Gastrointestinal:        Heartburn   Musculoskeletal: Positive for arthralgias and joint swelling.   Psychiatric/Behavioral: Positive for sleep disturbance.         Objective:    Physical Exam  Constitutional:       Appearance: Normal appearance.      Comments: BMI 30.97   HENT:      Head: Normocephalic and atraumatic.      Mouth/Throat:      Mouth: Mucous membranes are moist.      Pharynx: Oropharynx is clear.      Comments: Mallampati 1 anatomy  Eyes:      Conjunctiva/sclera: Conjunctivae normal.      Pupils: Pupils are equal, round, and reactive to light.   Cardiovascular:      Rate and Rhythm: Normal rate and regular rhythm.   Pulmonary:      Effort: Pulmonary effort is normal.      Breath sounds: Normal breath sounds.   Skin:     General: Skin is warm and dry.   Neurological:      Mental Status: She is alert and oriented to person, place, and time.   Psychiatric:         Mood and Affect: Mood normal.         Behavior: Behavior normal.         Thought Content: Thought content normal.         Judgment: Judgment normal.     /75   Pulse 82   Ht 162.6 cm (64\")   Wt 81.8 kg (180 lb 6.4 oz)   SpO2 97%   BMI 30.97 kg/m²     85/90 days of use  Greater than 4-hour use 91.1  90% pressure 9.7  AHI 1.9  Settings 8-18    ASSESSMENT/PLAN    Diagnoses and all orders for this visit:    1. LUKASZ (obstructive sleep apnea) (Primary)  -     CPAP Therapy            1. Counseled patient regarding multimodal approach with healthy nutrition, healthy sleep, regular physical activity, social activities, counseling, and medications. Encouraged to practice  Lateral sleep position. Avoid alcohol and sedatives close to bedtime.  2.   Refill supplies x1 year.  Return to clinic 1 year or sooner symptoms warrant.  I have reviewed the results of my evaluation and impression and discussed my recommendations in detail with the patient.      Signed by  Dia Ramey, " APRN    July 16, 2021      CC: Tom Bernal MD          No ref. provider found

## 2022-02-10 DIAGNOSIS — E78.2 MIXED HYPERLIPIDEMIA: ICD-10-CM

## 2022-02-10 DIAGNOSIS — I10 ESSENTIAL HYPERTENSION: ICD-10-CM

## 2022-02-10 DIAGNOSIS — K21.9 GASTROESOPHAGEAL REFLUX DISEASE WITHOUT ESOPHAGITIS: ICD-10-CM

## 2022-02-10 RX ORDER — HYDROCHLOROTHIAZIDE 25 MG/1
25 TABLET ORAL DAILY
Qty: 90 TABLET | Refills: 0 | Status: SHIPPED | OUTPATIENT
Start: 2022-02-10 | End: 2022-02-25 | Stop reason: SDUPTHER

## 2022-02-10 RX ORDER — AMLODIPINE BESYLATE 5 MG/1
5 TABLET ORAL DAILY
Qty: 90 TABLET | Refills: 0 | Status: SHIPPED | OUTPATIENT
Start: 2022-02-10 | End: 2022-02-25 | Stop reason: SDUPTHER

## 2022-02-10 RX ORDER — ATORVASTATIN CALCIUM 10 MG/1
10 TABLET, FILM COATED ORAL DAILY
Qty: 90 TABLET | Refills: 0 | Status: SHIPPED | OUTPATIENT
Start: 2022-02-10 | End: 2022-02-25 | Stop reason: SDUPTHER

## 2022-02-10 RX ORDER — LOSARTAN POTASSIUM 50 MG/1
50 TABLET ORAL DAILY
Qty: 90 TABLET | Refills: 0 | Status: SHIPPED | OUTPATIENT
Start: 2022-02-10 | End: 2022-02-25 | Stop reason: SDUPTHER

## 2022-02-10 RX ORDER — OMEPRAZOLE 40 MG/1
40 CAPSULE, DELAYED RELEASE ORAL DAILY
Qty: 90 CAPSULE | Refills: 0 | Status: SHIPPED | OUTPATIENT
Start: 2022-02-10 | End: 2022-02-25 | Stop reason: SDUPTHER

## 2022-02-11 DIAGNOSIS — I10 ESSENTIAL HYPERTENSION: ICD-10-CM

## 2022-02-11 DIAGNOSIS — K21.9 GASTROESOPHAGEAL REFLUX DISEASE WITHOUT ESOPHAGITIS: ICD-10-CM

## 2022-02-11 DIAGNOSIS — E78.2 MIXED HYPERLIPIDEMIA: ICD-10-CM

## 2022-02-11 RX ORDER — HYDROCHLOROTHIAZIDE 25 MG/1
25 TABLET ORAL DAILY
Qty: 90 TABLET | Refills: 0 | Status: CANCELLED | OUTPATIENT
Start: 2022-02-11

## 2022-02-11 RX ORDER — AMLODIPINE BESYLATE 5 MG/1
5 TABLET ORAL DAILY
Qty: 90 TABLET | Refills: 0 | Status: CANCELLED | OUTPATIENT
Start: 2022-02-11

## 2022-02-11 RX ORDER — ATORVASTATIN CALCIUM 10 MG/1
10 TABLET, FILM COATED ORAL DAILY
Qty: 90 TABLET | Refills: 0 | Status: CANCELLED | OUTPATIENT
Start: 2022-02-11

## 2022-02-11 RX ORDER — OMEPRAZOLE 40 MG/1
40 CAPSULE, DELAYED RELEASE ORAL DAILY
Qty: 90 CAPSULE | Refills: 0 | Status: CANCELLED | OUTPATIENT
Start: 2022-02-11

## 2022-02-25 ENCOUNTER — LAB (OUTPATIENT)
Dept: LAB | Facility: HOSPITAL | Age: 67
End: 2022-02-25

## 2022-02-25 ENCOUNTER — OFFICE VISIT (OUTPATIENT)
Dept: FAMILY MEDICINE CLINIC | Facility: CLINIC | Age: 67
End: 2022-02-25

## 2022-02-25 VITALS
DIASTOLIC BLOOD PRESSURE: 72 MMHG | OXYGEN SATURATION: 97 % | WEIGHT: 183.4 LBS | SYSTOLIC BLOOD PRESSURE: 116 MMHG | HEART RATE: 83 BPM | BODY MASS INDEX: 31.31 KG/M2 | RESPIRATION RATE: 12 BRPM | HEIGHT: 64 IN | TEMPERATURE: 97 F

## 2022-02-25 DIAGNOSIS — Z12.31 SCREENING MAMMOGRAM FOR BREAST CANCER: ICD-10-CM

## 2022-02-25 DIAGNOSIS — I10 ESSENTIAL HYPERTENSION: ICD-10-CM

## 2022-02-25 DIAGNOSIS — K21.9 GASTROESOPHAGEAL REFLUX DISEASE WITHOUT ESOPHAGITIS: ICD-10-CM

## 2022-02-25 DIAGNOSIS — Z13.820 ENCOUNTER FOR OSTEOPOROSIS SCREENING IN ASYMPTOMATIC POSTMENOPAUSAL PATIENT: ICD-10-CM

## 2022-02-25 DIAGNOSIS — E78.2 MIXED HYPERLIPIDEMIA: ICD-10-CM

## 2022-02-25 DIAGNOSIS — Z00.00 ENCOUNTER FOR ANNUAL WELLNESS EXAM IN MEDICARE PATIENT: Primary | ICD-10-CM

## 2022-02-25 DIAGNOSIS — Z23 IMMUNIZATION DUE: ICD-10-CM

## 2022-02-25 DIAGNOSIS — Z78.0 ENCOUNTER FOR OSTEOPOROSIS SCREENING IN ASYMPTOMATIC POSTMENOPAUSAL PATIENT: ICD-10-CM

## 2022-02-25 LAB
ALBUMIN SERPL-MCNC: 5.1 G/DL (ref 3.5–5.2)
ALBUMIN/GLOB SERPL: 2 G/DL
ALP SERPL-CCNC: 86 U/L (ref 39–117)
ALT SERPL W P-5'-P-CCNC: 16 U/L (ref 1–33)
ANION GAP SERPL CALCULATED.3IONS-SCNC: 15.9 MMOL/L (ref 5–15)
AST SERPL-CCNC: 25 U/L (ref 1–32)
BILIRUB SERPL-MCNC: 0.3 MG/DL (ref 0–1.2)
BILIRUB UR QL STRIP: NEGATIVE
BUN SERPL-MCNC: 11 MG/DL (ref 8–23)
BUN/CREAT SERPL: 16.7 (ref 7–25)
CALCIUM SPEC-SCNC: 9.9 MG/DL (ref 8.6–10.5)
CHLORIDE SERPL-SCNC: 99 MMOL/L (ref 98–107)
CHOLEST SERPL-MCNC: 223 MG/DL (ref 0–200)
CLARITY UR: CLEAR
CO2 SERPL-SCNC: 24.1 MMOL/L (ref 22–29)
COLOR UR: YELLOW
CREAT SERPL-MCNC: 0.66 MG/DL (ref 0.57–1)
GFR SERPL CREATININE-BSD FRML MDRD: 89 ML/MIN/1.73
GLOBULIN UR ELPH-MCNC: 2.6 GM/DL
GLUCOSE SERPL-MCNC: 89 MG/DL (ref 65–99)
GLUCOSE UR STRIP-MCNC: NEGATIVE MG/DL
HDLC SERPL-MCNC: 92 MG/DL (ref 40–60)
HGB UR QL STRIP.AUTO: NEGATIVE
KETONES UR QL STRIP: NEGATIVE
LDLC SERPL CALC-MCNC: 102 MG/DL (ref 0–100)
LDLC/HDLC SERPL: 1.05 {RATIO}
LEUKOCYTE ESTERASE UR QL STRIP.AUTO: ABNORMAL
NITRITE UR QL STRIP: NEGATIVE
PH UR STRIP.AUTO: 6 [PH] (ref 5–8)
POTASSIUM SERPL-SCNC: 4.2 MMOL/L (ref 3.5–5.2)
PROT SERPL-MCNC: 7.7 G/DL (ref 6–8.5)
PROT UR QL STRIP: NEGATIVE
SODIUM SERPL-SCNC: 139 MMOL/L (ref 136–145)
SP GR UR STRIP: 1.01 (ref 1–1.03)
TRIGL SERPL-MCNC: 174 MG/DL (ref 0–150)
TSH SERPL DL<=0.05 MIU/L-ACNC: 2.66 UIU/ML (ref 0.27–4.2)
UROBILINOGEN UR QL STRIP: ABNORMAL
VLDLC SERPL-MCNC: 29 MG/DL (ref 5–40)

## 2022-02-25 PROCEDURE — 99214 OFFICE O/P EST MOD 30 MIN: CPT | Performed by: NURSE PRACTITIONER

## 2022-02-25 PROCEDURE — 80061 LIPID PANEL: CPT | Performed by: NURSE PRACTITIONER

## 2022-02-25 PROCEDURE — 81001 URINALYSIS AUTO W/SCOPE: CPT | Performed by: NURSE PRACTITIONER

## 2022-02-25 PROCEDURE — 1170F FXNL STATUS ASSESSED: CPT | Performed by: NURSE PRACTITIONER

## 2022-02-25 PROCEDURE — G0009 ADMIN PNEUMOCOCCAL VACCINE: HCPCS | Performed by: NURSE PRACTITIONER

## 2022-02-25 PROCEDURE — 80053 COMPREHEN METABOLIC PANEL: CPT | Performed by: NURSE PRACTITIONER

## 2022-02-25 PROCEDURE — 90732 PPSV23 VACC 2 YRS+ SUBQ/IM: CPT | Performed by: NURSE PRACTITIONER

## 2022-02-25 PROCEDURE — 1126F AMNT PAIN NOTED NONE PRSNT: CPT | Performed by: NURSE PRACTITIONER

## 2022-02-25 PROCEDURE — 84443 ASSAY THYROID STIM HORMONE: CPT | Performed by: NURSE PRACTITIONER

## 2022-02-25 PROCEDURE — 1159F MED LIST DOCD IN RCRD: CPT | Performed by: NURSE PRACTITIONER

## 2022-02-25 RX ORDER — HYDROCHLOROTHIAZIDE 25 MG/1
25 TABLET ORAL DAILY
Qty: 90 TABLET | Refills: 1 | Status: SHIPPED | OUTPATIENT
Start: 2022-02-25 | End: 2022-04-11 | Stop reason: SDUPTHER

## 2022-02-25 RX ORDER — AMLODIPINE BESYLATE 5 MG/1
5 TABLET ORAL DAILY
Qty: 90 TABLET | Refills: 1 | Status: SHIPPED | OUTPATIENT
Start: 2022-02-25 | End: 2022-04-11 | Stop reason: SDUPTHER

## 2022-02-25 RX ORDER — LOSARTAN POTASSIUM 50 MG/1
50 TABLET ORAL DAILY
Qty: 90 TABLET | Refills: 1 | Status: SHIPPED | OUTPATIENT
Start: 2022-02-25 | End: 2022-04-22 | Stop reason: SDUPTHER

## 2022-02-25 RX ORDER — ATORVASTATIN CALCIUM 10 MG/1
10 TABLET, FILM COATED ORAL DAILY
Qty: 90 TABLET | Refills: 3 | Status: SHIPPED | OUTPATIENT
Start: 2022-02-25 | End: 2022-04-11 | Stop reason: SDUPTHER

## 2022-02-25 RX ORDER — OMEPRAZOLE 40 MG/1
40 CAPSULE, DELAYED RELEASE ORAL DAILY
Qty: 90 CAPSULE | Refills: 1 | Status: SHIPPED | OUTPATIENT
Start: 2022-02-25 | End: 2022-04-11 | Stop reason: SDUPTHER

## 2022-02-26 LAB
BACTERIA UR QL AUTO: NORMAL /HPF
HYALINE CASTS UR QL AUTO: NORMAL /LPF
RBC # UR STRIP: NORMAL /HPF
REF LAB TEST METHOD: NORMAL
SQUAMOUS #/AREA URNS HPF: NORMAL /HPF
WBC # UR STRIP: NORMAL /HPF

## 2022-03-23 ENCOUNTER — TRANSCRIBE ORDERS (OUTPATIENT)
Dept: ADMINISTRATIVE | Facility: HOSPITAL | Age: 67
End: 2022-03-23

## 2022-03-23 DIAGNOSIS — Z12.31 VISIT FOR SCREENING MAMMOGRAM: Primary | ICD-10-CM

## 2022-04-11 DIAGNOSIS — I10 ESSENTIAL HYPERTENSION: ICD-10-CM

## 2022-04-11 DIAGNOSIS — E78.2 MIXED HYPERLIPIDEMIA: ICD-10-CM

## 2022-04-11 DIAGNOSIS — K21.9 GASTROESOPHAGEAL REFLUX DISEASE WITHOUT ESOPHAGITIS: ICD-10-CM

## 2022-04-11 RX ORDER — HYDROCHLOROTHIAZIDE 25 MG/1
25 TABLET ORAL DAILY
Qty: 90 TABLET | Refills: 1 | Status: SHIPPED | OUTPATIENT
Start: 2022-04-11 | End: 2022-04-22 | Stop reason: SDUPTHER

## 2022-04-11 RX ORDER — OMEPRAZOLE 40 MG/1
40 CAPSULE, DELAYED RELEASE ORAL DAILY
Qty: 90 CAPSULE | Refills: 1 | Status: SHIPPED | OUTPATIENT
Start: 2022-04-11 | End: 2022-04-19 | Stop reason: SDUPTHER

## 2022-04-11 RX ORDER — AMLODIPINE BESYLATE 5 MG/1
5 TABLET ORAL DAILY
Qty: 90 TABLET | Refills: 1 | Status: SHIPPED | OUTPATIENT
Start: 2022-04-11 | End: 2022-04-19 | Stop reason: SDUPTHER

## 2022-04-11 RX ORDER — ATORVASTATIN CALCIUM 10 MG/1
10 TABLET, FILM COATED ORAL DAILY
Qty: 90 TABLET | Refills: 3 | Status: SHIPPED | OUTPATIENT
Start: 2022-04-11 | End: 2022-04-19 | Stop reason: SDUPTHER

## 2022-04-11 NOTE — TELEPHONE ENCOUNTER
Caller: [facility] - [place], [place] - [address] AT PORTAL TO REGISTERED [facility] SITES - [phone] [facility] [phone]     Relationship: Pharmacy    Best call back number: [phone]    Requested Prescriptions:   Requested Prescriptions     Pending Prescriptions Disp Refills   • amLODIPine (NORVASC) 5 MG tablet 90 tablet 1     Sig: Take 1 tablet by mouth Daily.   • atorvastatin (Lipitor) 10 MG tablet 90 tablet 3     Sig: Take 1 tablet by mouth Daily.   • hydroCHLOROthiazide (HYDRODIURIL) 25 MG tablet 90 tablet 1     Sig: Take 1 tablet by mouth Daily.   • omeprazole (priLOSEC) 40 MG capsule 90 capsule 1     Sig: Take 1 capsule by mouth Daily.        Pharmacy where request should be sent: [facility]     Additional details provided by patient: WOULD NEED REFILL BECAUSE [facility] HAS NOT RECEIVED REFILLS AND NPI # FOR PROVIDER IS NOT COMING UP FOR [facility] CUSTOMER CARE REP     PLEASE ADVISE    Does the patient have less than a 3 day supply:  [x] Yes  [] No    Yuki Costa Rep   04/11/22 15:07 EDT

## 2022-04-18 ENCOUNTER — TELEPHONE (OUTPATIENT)
Dept: FAMILY MEDICINE CLINIC | Facility: CLINIC | Age: 67
End: 2022-04-18

## 2022-04-18 NOTE — TELEPHONE ENCOUNTER
PATIENT CALLED AND STATES THAT [facility] ORDER HAD NOT RECEIVED THE SCRIPTS WRITTEN AND SENT ON 4/11/22, SHE WILL BE COMPLETLEY OUT ON Friday PLEASE RESEND OR CALL PATIENT TO ADVISE.

## 2022-04-19 DIAGNOSIS — I10 ESSENTIAL HYPERTENSION: ICD-10-CM

## 2022-04-19 DIAGNOSIS — K21.9 GASTROESOPHAGEAL REFLUX DISEASE WITHOUT ESOPHAGITIS: ICD-10-CM

## 2022-04-19 DIAGNOSIS — E78.2 MIXED HYPERLIPIDEMIA: ICD-10-CM

## 2022-04-19 RX ORDER — AMLODIPINE BESYLATE 5 MG/1
5 TABLET ORAL DAILY
Qty: 90 TABLET | Refills: 1 | Status: SHIPPED | OUTPATIENT
Start: 2022-04-19 | End: 2022-04-22 | Stop reason: SDUPTHER

## 2022-04-19 RX ORDER — OMEPRAZOLE 40 MG/1
40 CAPSULE, DELAYED RELEASE ORAL DAILY
Qty: 90 CAPSULE | Refills: 1 | Status: SHIPPED | OUTPATIENT
Start: 2022-04-19 | End: 2022-04-22 | Stop reason: SDUPTHER

## 2022-04-19 RX ORDER — ATORVASTATIN CALCIUM 10 MG/1
10 TABLET, FILM COATED ORAL DAILY
Qty: 90 TABLET | Refills: 3 | Status: SHIPPED | OUTPATIENT
Start: 2022-04-19 | End: 2022-04-22 | Stop reason: SDUPTHER

## 2022-04-22 DIAGNOSIS — K21.9 GASTROESOPHAGEAL REFLUX DISEASE WITHOUT ESOPHAGITIS: ICD-10-CM

## 2022-04-22 DIAGNOSIS — E78.2 MIXED HYPERLIPIDEMIA: ICD-10-CM

## 2022-04-22 DIAGNOSIS — I10 ESSENTIAL HYPERTENSION: ICD-10-CM

## 2022-04-22 RX ORDER — ATORVASTATIN CALCIUM 10 MG/1
10 TABLET, FILM COATED ORAL DAILY
Qty: 90 TABLET | Refills: 3 | Status: SHIPPED | OUTPATIENT
Start: 2022-04-22 | End: 2022-04-29 | Stop reason: SDUPTHER

## 2022-04-22 RX ORDER — AMLODIPINE BESYLATE 5 MG/1
5 TABLET ORAL DAILY
Qty: 90 TABLET | Refills: 1 | Status: SHIPPED | OUTPATIENT
Start: 2022-04-22 | End: 2022-04-29 | Stop reason: SDUPTHER

## 2022-04-22 RX ORDER — FEXOFENADINE HCL 180 MG/1
180 TABLET ORAL DAILY
Qty: 90 TABLET | Refills: 3 | Status: SHIPPED | OUTPATIENT
Start: 2022-04-22

## 2022-04-22 RX ORDER — HYDROCHLOROTHIAZIDE 25 MG/1
25 TABLET ORAL DAILY
Qty: 90 TABLET | Refills: 1 | Status: SHIPPED | OUTPATIENT
Start: 2022-04-22 | End: 2022-04-29 | Stop reason: SDUPTHER

## 2022-04-22 RX ORDER — LOSARTAN POTASSIUM 50 MG/1
50 TABLET ORAL DAILY
Qty: 90 TABLET | Refills: 1 | Status: SHIPPED | OUTPATIENT
Start: 2022-05-22 | End: 2022-04-29 | Stop reason: SDUPTHER

## 2022-04-22 RX ORDER — OMEPRAZOLE 40 MG/1
40 CAPSULE, DELAYED RELEASE ORAL DAILY
Qty: 90 CAPSULE | Refills: 3 | Status: SHIPPED | OUTPATIENT
Start: 2022-04-22 | End: 2022-04-29 | Stop reason: SDUPTHER

## 2022-04-22 RX ORDER — LOSARTAN POTASSIUM 50 MG/1
50 TABLET ORAL DAILY
Qty: 30 TABLET | Refills: 0 | Status: SHIPPED | OUTPATIENT
Start: 2022-04-22 | End: 2022-04-22 | Stop reason: SDUPTHER

## 2022-04-29 DIAGNOSIS — E78.2 MIXED HYPERLIPIDEMIA: ICD-10-CM

## 2022-04-29 DIAGNOSIS — K21.9 GASTROESOPHAGEAL REFLUX DISEASE WITHOUT ESOPHAGITIS: ICD-10-CM

## 2022-04-29 DIAGNOSIS — I10 ESSENTIAL HYPERTENSION: ICD-10-CM

## 2022-04-29 RX ORDER — AMLODIPINE BESYLATE 5 MG/1
5 TABLET ORAL DAILY
Qty: 90 TABLET | Refills: 1 | Status: SHIPPED | OUTPATIENT
Start: 2022-04-29 | End: 2022-05-09 | Stop reason: SDUPTHER

## 2022-04-29 RX ORDER — HYDROCHLOROTHIAZIDE 25 MG/1
25 TABLET ORAL DAILY
Qty: 90 TABLET | Refills: 1 | Status: SHIPPED | OUTPATIENT
Start: 2022-04-29 | End: 2022-05-09 | Stop reason: SDUPTHER

## 2022-04-29 RX ORDER — OMEPRAZOLE 40 MG/1
40 CAPSULE, DELAYED RELEASE ORAL DAILY
Qty: 90 CAPSULE | Refills: 3 | Status: SHIPPED | OUTPATIENT
Start: 2022-04-29 | End: 2022-05-09 | Stop reason: SDUPTHER

## 2022-04-29 RX ORDER — ATORVASTATIN CALCIUM 10 MG/1
10 TABLET, FILM COATED ORAL DAILY
Qty: 90 TABLET | Refills: 3 | Status: SHIPPED | OUTPATIENT
Start: 2022-04-29 | End: 2022-05-09 | Stop reason: SDUPTHER

## 2022-04-29 RX ORDER — LOSARTAN POTASSIUM 50 MG/1
50 TABLET ORAL DAILY
Qty: 90 TABLET | Refills: 1 | Status: SHIPPED | OUTPATIENT
Start: 2022-05-22 | End: 2022-05-09 | Stop reason: SDUPTHER

## 2022-04-29 NOTE — TELEPHONE ENCOUNTER
[facility] CALLED AND STATED THAT THEY HAVE NOT RECEIVED REFILLS ON THE MEDS THAT WERE PENDED TODAY. VIKKI FROM [facility] STATED THAT THE CORRECT FAX FOR THEM -[phone].

## 2022-05-09 ENCOUNTER — HOSPITAL ENCOUNTER (OUTPATIENT)
Dept: MAMMOGRAPHY | Facility: HOSPITAL | Age: 67
Discharge: HOME | End: 2022-05-09
Admitting: NURSE PRACTITIONER

## 2022-05-09 DIAGNOSIS — I10 ESSENTIAL HYPERTENSION: ICD-10-CM

## 2022-05-09 DIAGNOSIS — K21.9 GASTROESOPHAGEAL REFLUX DISEASE WITHOUT ESOPHAGITIS: ICD-10-CM

## 2022-05-09 DIAGNOSIS — Z12.31 VISIT FOR SCREENING MAMMOGRAM: ICD-10-CM

## 2022-05-09 DIAGNOSIS — E78.2 MIXED HYPERLIPIDEMIA: ICD-10-CM

## 2022-05-09 PROCEDURE — 77067 SCR MAMMO BI INCL CAD: CPT | Performed by: RADIOLOGY

## 2022-05-09 PROCEDURE — 77067 SCR MAMMO BI INCL CAD: CPT

## 2022-05-09 PROCEDURE — 77063 BREAST TOMOSYNTHESIS BI: CPT

## 2022-05-09 PROCEDURE — 77063 BREAST TOMOSYNTHESIS BI: CPT | Performed by: RADIOLOGY

## 2022-05-09 RX ORDER — HYDROCHLOROTHIAZIDE 25 MG/1
25 TABLET ORAL DAILY
Qty: 90 TABLET | Refills: 1 | Status: SHIPPED | OUTPATIENT
Start: 2022-05-09 | End: 2022-08-22 | Stop reason: SDUPTHER

## 2022-05-09 RX ORDER — LOSARTAN POTASSIUM 50 MG/1
50 TABLET ORAL DAILY
Qty: 90 TABLET | Refills: 1 | Status: SHIPPED | OUTPATIENT
Start: 2022-05-22 | End: 2022-08-22 | Stop reason: SDUPTHER

## 2022-05-09 RX ORDER — AMLODIPINE BESYLATE 5 MG/1
5 TABLET ORAL DAILY
Qty: 90 TABLET | Refills: 1 | Status: SHIPPED | OUTPATIENT
Start: 2022-05-09 | End: 2022-08-22 | Stop reason: SDUPTHER

## 2022-05-09 RX ORDER — ATORVASTATIN CALCIUM 10 MG/1
10 TABLET, FILM COATED ORAL DAILY
Qty: 90 TABLET | Refills: 3 | Status: SHIPPED | OUTPATIENT
Start: 2022-05-09 | End: 2022-08-22

## 2022-05-09 RX ORDER — OMEPRAZOLE 40 MG/1
40 CAPSULE, DELAYED RELEASE ORAL DAILY
Qty: 90 CAPSULE | Refills: 3 | Status: SHIPPED | OUTPATIENT
Start: 2022-05-09 | End: 2023-02-28 | Stop reason: SDUPTHER

## 2022-05-09 NOTE — TELEPHONE ENCOUNTER
Caller: Ana Alaniz    Relationship: Self    Best call back number: [phone]    Requested Prescriptions:   Requested Prescriptions     Pending Prescriptions Disp Refills   • amLODIPine (NORVASC) 5 MG tablet 90 tablet 1     Sig: Take 1 tablet by mouth Daily.   • omeprazole (priLOSEC) 40 MG capsule 90 capsule 3     Sig: Take 1 capsule by mouth Daily.   • losartan (COZAAR) 50 MG tablet 90 tablet 1     Sig: Take 1 tablet by mouth Daily.   • hydroCHLOROthiazide (HYDRODIURIL) 25 MG tablet 90 tablet 1     Sig: Take 1 tablet by mouth Daily.   • atorvastatin (Lipitor) 10 MG tablet 90 tablet 3     Sig: Take 1 tablet by mouth Daily.        Pharmacy where request should be sent: [facility] [address] - [phone] [facility] [phone] FX     Additional details provided by patient:  IS OUT OF HYDROCHLOROTHIAZIDE     Does the patient have less than a 3 day supply:  [x] Yes  [] No    Yuki Brown Rep   05/09/22 09:30 EDT

## 2022-05-20 ENCOUNTER — HOSPITAL ENCOUNTER (OUTPATIENT)
Dept: BONE DENSITY | Facility: HOSPITAL | Age: 67
Discharge: HOME | End: 2022-05-20
Admitting: NURSE PRACTITIONER

## 2022-05-20 DIAGNOSIS — Z13.820 ENCOUNTER FOR OSTEOPOROSIS SCREENING IN ASYMPTOMATIC POSTMENOPAUSAL PATIENT: ICD-10-CM

## 2022-05-20 DIAGNOSIS — Z78.0 ENCOUNTER FOR OSTEOPOROSIS SCREENING IN ASYMPTOMATIC POSTMENOPAUSAL PATIENT: ICD-10-CM

## 2022-05-20 PROCEDURE — 77080 DXA BONE DENSITY AXIAL: CPT

## 2022-05-21 PROBLEM — M85.852 OSTEOPENIA OF NECK OF LEFT FEMUR: Chronic | Status: ACTIVE | Noted: 2022-05-21

## 2022-07-12 ENCOUNTER — HOSPITAL ENCOUNTER (OUTPATIENT)
Dept: MAMMOGRAPHY | Facility: HOSPITAL | Age: 67
Discharge: HOME | End: 2022-07-12
Admitting: RADIOLOGY

## 2022-07-12 DIAGNOSIS — R92.8 ABNORMAL MAMMOGRAM: ICD-10-CM

## 2022-07-12 PROCEDURE — 77065 DX MAMMO INCL CAD UNI: CPT | Performed by: RADIOLOGY

## 2022-07-12 PROCEDURE — G0279 TOMOSYNTHESIS, MAMMO: HCPCS

## 2022-07-12 PROCEDURE — G0279 TOMOSYNTHESIS, MAMMO: HCPCS | Performed by: RADIOLOGY

## 2022-07-12 PROCEDURE — 77065 DX MAMMO INCL CAD UNI: CPT

## 2022-07-18 ENCOUNTER — OFFICE VISIT (OUTPATIENT)
Dept: SLEEP MEDICINE | Facility: HOSPITAL | Age: 67
End: 2022-07-18

## 2022-07-18 VITALS
BODY MASS INDEX: 31.07 KG/M2 | OXYGEN SATURATION: 97 % | HEIGHT: 64 IN | HEART RATE: 76 BPM | WEIGHT: 182 LBS | DIASTOLIC BLOOD PRESSURE: 68 MMHG | SYSTOLIC BLOOD PRESSURE: 128 MMHG

## 2022-07-18 DIAGNOSIS — G47.33 OSA (OBSTRUCTIVE SLEEP APNEA): Primary | ICD-10-CM

## 2022-07-18 PROCEDURE — 99213 OFFICE O/P EST LOW 20 MIN: CPT | Performed by: NURSE PRACTITIONER

## 2022-07-18 RX ORDER — KETOROLAC TROMETHAMINE 5 MG/ML
SOLUTION OPHTHALMIC
COMMUNITY
Start: 2022-05-18 | End: 2023-02-28

## 2022-07-18 NOTE — PROGRESS NOTES
Chief Complaint:   Chief Complaint   Patient presents with   • Follow-up       HPI:    Ana Alaniz is a 67 y.o. female here for follow-up of sleep apnea.  Patient was last seen 7/16/2021.  Patient continues to do well with CPAP therapy.  Patient is sleeping 7 to 9 hours nightly and does feel rested upon awakening.  Patient goes to sleep within 20 to 30 minutes only occasionally get up x1.  Patient has an [place] score of 4/24.  We did discuss today the MAD as patient does have a friend that wears 1.  We did discuss the severity of her sleep apnea that I do not think oral appliance would be a good fit for her.  Patient states she will continue with CPAP.  She has no other concerns or complaints and will continue therapy.        Current medications are:   Current Outpatient Medications:   •  amLODIPine (NORVASC) 5 MG tablet, Take 1 tablet by mouth Daily., Disp: 90 tablet, Rfl: 1  •  atorvastatin (Lipitor) 10 MG tablet, Take 1 tablet by mouth Daily., Disp: 90 tablet, Rfl: 3  •  fexofenadine (ALLEGRA) 180 MG tablet, Take 1 tablet by mouth Daily., Disp: 90 tablet, Rfl: 3  •  Flaxseed, Linseed, (FLAX SEED OIL) 1300 MG capsule, Take 1 tablet by mouth Daily., Disp: , Rfl:   •  Glucosamine-Chondroit-Vit C-Mn (GLUCOSAMINE CHONDR 1500 COMPLX) capsule, Take 1,500 mg by mouth 2 (Two) Times a Day., Disp: , Rfl:   •  hydroCHLOROthiazide (HYDRODIURIL) 25 MG tablet, Take 1 tablet by mouth Daily., Disp: 90 tablet, Rfl: 1  •  ketorolac (ACULAR) 0.5 % ophthalmic solution, , Disp: , Rfl:   •  losartan (COZAAR) 50 MG tablet, Take 1 tablet by mouth Daily., Disp: 90 tablet, Rfl: 1  •  Lutein-Zeaxanthin 25-5 MG capsule, Take 1 tablet by mouth Daily., Disp: , Rfl:   •  Multiple Vitamins-Minerals (PRESERVISION AREDS 2 PO), Take  by mouth Daily., Disp: , Rfl:   •  Omega-3 Fatty Acids (OMEGA-3 FISH OIL) 500 MG capsule, Take 500 mg by mouth Daily., Disp: , Rfl:   •  omeprazole (priLOSEC) 40 MG capsule, Take 1 capsule by mouth Daily., Disp: 90  capsule, Rfl: 3.      The patient's relevant past medical, surgical, family and social history were reviewed and updated in Epic as appropriate.       Review of Systems   Eyes: Positive for visual disturbance.   Respiratory: Positive for apnea.    Cardiovascular: Positive for palpitations.   Gastrointestinal:        Heartburn   Musculoskeletal: Positive for arthralgias and joint swelling.   Psychiatric/Behavioral: Positive for sleep disturbance.   All other systems reviewed and are negative.        Objective:    Physical Exam  Constitutional:       Appearance: Normal appearance.   HENT:      Head: Normocephalic and atraumatic.      Mouth/Throat:      Comments: Mallampati 1 anatomy  Pulmonary:      Effort: Pulmonary effort is normal.      Breath sounds: Normal breath sounds.   Neurological:      Mental Status: She is alert and oriented to person, place, and time.   Psychiatric:         Mood and Affect: Mood normal.         Behavior: Behavior normal.         Thought Content: Thought content normal.         Judgment: Judgment normal.         CPAP Report  81/90 days of use  Greater than 4-hour use 85.6  8% pressure 10.0  AHI of 2.1  Settings 8-18    The patient continues to use and benefit from CPAP therapy.    ASSESSMENT/PLAN    Diagnoses and all orders for this visit:    1. LUKASZ (obstructive sleep apnea) (Primary)  -     PAP Therapy        1. Counseled patient regarding multimodal approach with healthy nutrition, healthy sleep, regular physical activity, social activities, counseling, and medications. Encouraged to practice lateral sleep position. Avoid alcohol and sedatives close to bedtime.  2.   Refill supplies x1 year.  Return to clinic 1 year or sooner as symptoms warrant.    I have reviewed the results of my evaluation and impression and discussed my recommendations in detail with the patient.      Signed by  LYNDA Kitchen    July 18, 2022      CC: Omaira Edwards APRN         No ref. provider  found

## 2022-08-22 ENCOUNTER — LAB (OUTPATIENT)
Dept: LAB | Facility: HOSPITAL | Age: 67
End: 2022-08-22

## 2022-08-22 ENCOUNTER — OFFICE VISIT (OUTPATIENT)
Dept: FAMILY MEDICINE CLINIC | Facility: CLINIC | Age: 67
End: 2022-08-22

## 2022-08-22 VITALS
RESPIRATION RATE: 16 BRPM | HEART RATE: 72 BPM | BODY MASS INDEX: 30.66 KG/M2 | DIASTOLIC BLOOD PRESSURE: 80 MMHG | HEIGHT: 64 IN | WEIGHT: 179.6 LBS | SYSTOLIC BLOOD PRESSURE: 126 MMHG | TEMPERATURE: 98 F | OXYGEN SATURATION: 98 %

## 2022-08-22 DIAGNOSIS — I10 HYPERTENSION, UNSPECIFIED TYPE: ICD-10-CM

## 2022-08-22 DIAGNOSIS — M79.672 ACUTE FOOT PAIN, LEFT: ICD-10-CM

## 2022-08-22 DIAGNOSIS — K21.9 GASTROESOPHAGEAL REFLUX DISEASE WITHOUT ESOPHAGITIS: ICD-10-CM

## 2022-08-22 DIAGNOSIS — I10 HYPERTENSION, UNSPECIFIED TYPE: Primary | ICD-10-CM

## 2022-08-22 DIAGNOSIS — E78.2 MIXED HYPERLIPIDEMIA: ICD-10-CM

## 2022-08-22 DIAGNOSIS — I10 ESSENTIAL HYPERTENSION: ICD-10-CM

## 2022-08-22 DIAGNOSIS — M85.852 OSTEOPENIA OF NECK OF LEFT FEMUR: Chronic | ICD-10-CM

## 2022-08-22 PROCEDURE — 80053 COMPREHEN METABOLIC PANEL: CPT

## 2022-08-22 PROCEDURE — 99214 OFFICE O/P EST MOD 30 MIN: CPT | Performed by: NURSE PRACTITIONER

## 2022-08-22 PROCEDURE — 84550 ASSAY OF BLOOD/URIC ACID: CPT

## 2022-08-22 PROCEDURE — 36415 COLL VENOUS BLD VENIPUNCTURE: CPT

## 2022-08-22 RX ORDER — ATORVASTATIN CALCIUM 20 MG/1
20 TABLET, FILM COATED ORAL DAILY
Qty: 90 TABLET | Refills: 1 | Status: SHIPPED | OUTPATIENT
Start: 2022-08-22 | End: 2023-02-28 | Stop reason: SDUPTHER

## 2022-08-22 RX ORDER — LOSARTAN POTASSIUM 50 MG/1
50 TABLET ORAL DAILY
Qty: 90 TABLET | Refills: 1 | Status: SHIPPED | OUTPATIENT
Start: 2022-08-22 | End: 2022-10-25 | Stop reason: SDUPTHER

## 2022-08-22 RX ORDER — AMLODIPINE BESYLATE 5 MG/1
5 TABLET ORAL DAILY
Qty: 90 TABLET | Refills: 1 | Status: SHIPPED | OUTPATIENT
Start: 2022-08-22 | End: 2022-11-07 | Stop reason: SDUPTHER

## 2022-08-22 RX ORDER — HYDROCHLOROTHIAZIDE 25 MG/1
25 TABLET ORAL DAILY
Qty: 90 TABLET | Refills: 1 | Status: SHIPPED | OUTPATIENT
Start: 2022-08-22 | End: 2022-10-25 | Stop reason: SDUPTHER

## 2022-08-22 RX ORDER — ATORVASTATIN CALCIUM 10 MG/1
10 TABLET, FILM COATED ORAL DAILY
Qty: 90 TABLET | Refills: 3 | Status: CANCELLED | OUTPATIENT
Start: 2022-08-22

## 2022-08-22 NOTE — PROGRESS NOTES
Answers for HPI/ROS submitted by the patient on 8/21/2022  Please describe your symptoms.: Appointment was set up automatically from my visit last Feb.  Not sure why. I am having a problem with my foot. I would like to see if x-ray may be needed.  Have you had these symptoms before?: No  How long have you been having these symptoms?: Greater than 2 weeks  What is the primary reason for your visit?: Other    Chief Complaint  Follow-up (Follow up for hypertension. ), Hypertension, and Foot Pain    Subjective          Ana Alaniz presents to [facility]  Patient is a 67-year-old female.  She is here for follow-up for hypertension, hyperlipidemia, and GERD. She states she is tolerating medications well. Reviewed her labs from 02/2022.   Her cholesterol and triglycerides remain elevated. Discussed increasing her lipitor to 20 mg nightly. She is agreeable. Will check routine labs.   She is complaining of left foot pain at the base of her toes # 2 and #3. She vaguely remembers hitting her foot. But cannot pin point an exact issue or time.   There is some tenderness with slightly swelling. No bruising seen. She denies any numbness or tingling. No hx of gout.         The following portions of the patient's history were reviewed and updated as appropriate: allergies, current medications, past family history, past medical history, past social history, past surgical history and problem list.    Review of Systems   Constitutional: Negative.    Respiratory: Negative.    Cardiovascular: Positive for leg swelling. Negative for chest pain and palpitations.   Gastrointestinal: Negative.    Genitourinary: Negative.    Musculoskeletal: Positive for arthralgias and myalgias.        Bilateral knee pain - with walking had bilateral knee replacements years ago.     Left foot pain    Skin: Negative.    Neurological: Negative.    Hematological: Negative.    Psychiatric/Behavioral: Negative.   "        Objective   Vital Signs:   /80   Pulse 72   Temp 98 °F (36.7 °C) (Temporal)   Resp 16   Ht 162.6 cm (64.02\")   Wt 81.5 kg (179 lb 9.6 oz)   SpO2 98%   BMI 30.81 kg/m²               Physical Exam  Vitals reviewed.   HENT:      Mouth/Throat:      Mouth: Mucous membranes are moist.   Eyes:      Pupils: Pupils are equal, round, and reactive to light.   Cardiovascular:      Rate and Rhythm: Normal rate and regular rhythm.      Pulses: Normal pulses.      Heart sounds: Normal heart sounds.   Pulmonary:      Breath sounds: Normal breath sounds.   Abdominal:      Palpations: Abdomen is soft.   Musculoskeletal:         General: Swelling and tenderness present.      Comments: Left foot / dorsal side. Small amount of swelling at toes #2 and #3. Tenderness. No bruising or other injury seen.   Skin:     General: Skin is warm and dry.      Capillary Refill: Capillary refill takes less than 2 seconds.   Neurological:      Mental Status: She is alert and oriented to person, place, and time.   Psychiatric:         Mood and Affect: Mood normal.         Behavior: Behavior normal.        Result Review :             Urinalysis, Microscopic Only - Urine, Clean Catch (02/25/2022 10:41)  Comprehensive Metabolic Panel (02/25/2022 10:41)  TSH (02/25/2022 10:41)  Urinalysis With Culture If Indicated - Urine, Clean Catch (02/25/2022 10:41)  Lipid Panel (02/25/2022 10:41)       Assessment and Plan {CC Problem List  Visit Diagnosis  ROS  Review (Popup)  Health Maintenance  Quality  BestPractice  Medications  SmartSets  SnapShot Encounters  Media :23}   Diagnoses and all orders for this visit:    1. Hypertension, unspecified type (Primary)  -     Comprehensive Metabolic Panel; Future  -     losartan (COZAAR) 50 MG tablet; Take 1 tablet by mouth Daily.  Dispense: 90 tablet; Refill: 1  -     amLODIPine (NORVASC) 5 MG tablet; Take 1 tablet by mouth Daily.  Dispense: 90 tablet; Refill: 1  -     hydroCHLOROthiazide " (HYDRODIURIL) 25 MG tablet; Take 1 tablet by mouth Daily.  Dispense: 90 tablet; Refill: 1    2. Mixed hyperlipidemia  -     atorvastatin (Lipitor) 20 MG tablet; Take 1 tablet by mouth Daily.  Dispense: 90 tablet; Refill: 1    3. Gastroesophageal reflux disease without esophagitis  -     Comprehensive Metabolic Panel; Future    4. Essential hypertension  -     losartan (COZAAR) 50 MG tablet; Take 1 tablet by mouth Daily.  Dispense: 90 tablet; Refill: 1  -     amLODIPine (NORVASC) 5 MG tablet; Take 1 tablet by mouth Daily.  Dispense: 90 tablet; Refill: 1  -     hydroCHLOROthiazide (HYDRODIURIL) 25 MG tablet; Take 1 tablet by mouth Daily.  Dispense: 90 tablet; Refill: 1    5. Acute foot pain, left  -     XR Foot 3+ View Left; Future  -     Uric acid; Future    6. Osteopenia of neck of left femur    continue the calcium and vitamin d supplement. Will need to recheck DEXA scan in 2-3 years.   Increasing lipitor for mixed hyperlipidemia.   Follow up as needed and in 6 months for wellness.       Follow Up   Return in about 6 months (around 2/22/2023) for Medicare Wellness.  Patient was given instructions and counseling regarding her condition or for health maintenance advice. Please see specific information pulled into the AVS if appropriate.

## 2022-08-23 LAB
ALBUMIN SERPL-MCNC: 5.1 G/DL (ref 3.5–5.2)
ALBUMIN/GLOB SERPL: 2 G/DL
ALP SERPL-CCNC: 79 U/L (ref 39–117)
ALT SERPL W P-5'-P-CCNC: 19 U/L (ref 1–33)
ANION GAP SERPL CALCULATED.3IONS-SCNC: 15 MMOL/L (ref 5–15)
AST SERPL-CCNC: 24 U/L (ref 1–32)
BILIRUB SERPL-MCNC: 0.6 MG/DL (ref 0–1.2)
BUN SERPL-MCNC: 15 MG/DL (ref 8–23)
BUN/CREAT SERPL: 16.7 (ref 7–25)
CALCIUM SPEC-SCNC: 10.4 MG/DL (ref 8.6–10.5)
CHLORIDE SERPL-SCNC: 100 MMOL/L (ref 98–107)
CO2 SERPL-SCNC: 25 MMOL/L (ref 22–29)
CREAT SERPL-MCNC: 0.9 MG/DL (ref 0.57–1)
EGFRCR SERPLBLD CKD-EPI 2021: 69.3 ML/MIN/1.73
GLOBULIN UR ELPH-MCNC: 2.6 GM/DL
GLUCOSE SERPL-MCNC: 92 MG/DL (ref 65–99)
POTASSIUM SERPL-SCNC: 3.9 MMOL/L (ref 3.5–5.2)
PROT SERPL-MCNC: 7.7 G/DL (ref 6–8.5)
SODIUM SERPL-SCNC: 140 MMOL/L (ref 136–145)
URATE SERPL-MCNC: 5 MG/DL (ref 2.4–5.7)

## 2022-10-25 DIAGNOSIS — I10 ESSENTIAL HYPERTENSION: ICD-10-CM

## 2022-10-25 DIAGNOSIS — I10 HYPERTENSION, UNSPECIFIED TYPE: ICD-10-CM

## 2022-10-25 RX ORDER — LOSARTAN POTASSIUM 50 MG/1
50 TABLET ORAL DAILY
Qty: 90 TABLET | Refills: 1 | Status: SHIPPED | OUTPATIENT
Start: 2022-10-25 | End: 2023-02-28 | Stop reason: SDUPTHER

## 2022-10-25 RX ORDER — HYDROCHLOROTHIAZIDE 25 MG/1
25 TABLET ORAL DAILY
Qty: 90 TABLET | Refills: 1 | Status: SHIPPED | OUTPATIENT
Start: 2022-10-25 | End: 2023-02-28 | Stop reason: SDUPTHER

## 2022-10-25 NOTE — TELEPHONE ENCOUNTER
Caller: Ana Alaniz    Relationship: Self    Best call back number:  [phone]     Requested Prescriptions:   Requested Prescriptions     Pending Prescriptions Disp Refills   • hydroCHLOROthiazide (HYDRODIURIL) 25 MG tablet 90 tablet 1     Sig: Take 1 tablet by mouth Daily.   • losartan (COZAAR) 50 MG tablet 90 tablet 1     Sig: Take 1 tablet by mouth Daily.        Pharmacy where request should be sent: [facility] [id number] [facility] [phone] [facility] [phone] FX     Additional details provided by patient:      Does the patient have less than a 3 day supply:  [] Yes  [x] No

## 2022-11-07 DIAGNOSIS — I10 ESSENTIAL HYPERTENSION: ICD-10-CM

## 2022-11-07 DIAGNOSIS — I10 HYPERTENSION, UNSPECIFIED TYPE: ICD-10-CM

## 2022-11-07 RX ORDER — AMLODIPINE BESYLATE 5 MG/1
5 TABLET ORAL DAILY
Qty: 90 TABLET | Refills: 1 | Status: SHIPPED | OUTPATIENT
Start: 2022-11-07 | End: 2023-02-28 | Stop reason: SDUPTHER

## 2023-02-28 ENCOUNTER — OFFICE VISIT (OUTPATIENT)
Dept: FAMILY MEDICINE CLINIC | Facility: CLINIC | Age: 68
End: 2023-02-28
Payer: MEDICARE

## 2023-02-28 ENCOUNTER — LAB (OUTPATIENT)
Dept: LAB | Facility: HOSPITAL | Age: 68
End: 2023-02-28
Payer: MEDICARE

## 2023-02-28 VITALS
RESPIRATION RATE: 21 BRPM | OXYGEN SATURATION: 98 % | WEIGHT: 179.4 LBS | SYSTOLIC BLOOD PRESSURE: 136 MMHG | HEART RATE: 64 BPM | HEIGHT: 64 IN | BODY MASS INDEX: 30.63 KG/M2 | TEMPERATURE: 98.7 F | DIASTOLIC BLOOD PRESSURE: 80 MMHG

## 2023-02-28 DIAGNOSIS — E78.2 MIXED HYPERLIPIDEMIA: ICD-10-CM

## 2023-02-28 DIAGNOSIS — I10 HYPERTENSION, UNSPECIFIED TYPE: ICD-10-CM

## 2023-02-28 DIAGNOSIS — I10 ESSENTIAL HYPERTENSION: ICD-10-CM

## 2023-02-28 DIAGNOSIS — K21.9 GASTROESOPHAGEAL REFLUX DISEASE WITHOUT ESOPHAGITIS: ICD-10-CM

## 2023-02-28 DIAGNOSIS — Z12.31 SCREENING MAMMOGRAM FOR BREAST CANCER: Primary | ICD-10-CM

## 2023-02-28 LAB
ALBUMIN SERPL-MCNC: 4.7 G/DL (ref 3.5–5.2)
ALBUMIN/GLOB SERPL: 1.8 G/DL
ALP SERPL-CCNC: 80 U/L (ref 39–117)
ALT SERPL W P-5'-P-CCNC: 16 U/L (ref 1–33)
ANION GAP SERPL CALCULATED.3IONS-SCNC: 14 MMOL/L (ref 5–15)
AST SERPL-CCNC: 24 U/L (ref 1–32)
BILIRUB SERPL-MCNC: 0.5 MG/DL (ref 0–1.2)
BILIRUB UR QL STRIP: NEGATIVE
BUN SERPL-MCNC: 10 MG/DL (ref 8–23)
BUN/CREAT SERPL: 13.9 (ref 7–25)
CALCIUM SPEC-SCNC: 10 MG/DL (ref 8.6–10.5)
CHLORIDE SERPL-SCNC: 98 MMOL/L (ref 98–107)
CHOLEST SERPL-MCNC: 207 MG/DL (ref 0–200)
CLARITY UR: CLEAR
CO2 SERPL-SCNC: 26 MMOL/L (ref 22–29)
COLOR UR: ABNORMAL
CREAT SERPL-MCNC: 0.72 MG/DL (ref 0.57–1)
EGFRCR SERPLBLD CKD-EPI 2021: 90.1 ML/MIN/1.73
GLOBULIN UR ELPH-MCNC: 2.6 GM/DL
GLUCOSE SERPL-MCNC: 91 MG/DL (ref 65–99)
GLUCOSE UR STRIP-MCNC: NEGATIVE MG/DL
HDLC SERPL-MCNC: 81 MG/DL (ref 40–60)
HGB UR QL STRIP.AUTO: NEGATIVE
KETONES UR QL STRIP: NEGATIVE
LDLC SERPL CALC-MCNC: 96 MG/DL (ref 0–100)
LDLC/HDLC SERPL: 1.11 {RATIO}
LEUKOCYTE ESTERASE UR QL STRIP.AUTO: NEGATIVE
NITRITE UR QL STRIP: NEGATIVE
PH UR STRIP.AUTO: 6 [PH] (ref 5–8)
POTASSIUM SERPL-SCNC: 4 MMOL/L (ref 3.5–5.2)
PROT SERPL-MCNC: 7.3 G/DL (ref 6–8.5)
PROT UR QL STRIP: ABNORMAL
SODIUM SERPL-SCNC: 138 MMOL/L (ref 136–145)
SP GR UR STRIP: 1.02 (ref 1–1.03)
TRIGL SERPL-MCNC: 181 MG/DL (ref 0–150)
UROBILINOGEN UR QL STRIP: ABNORMAL
VLDLC SERPL-MCNC: 30 MG/DL (ref 5–40)

## 2023-02-28 PROCEDURE — 1160F RVW MEDS BY RX/DR IN RCRD: CPT | Performed by: NURSE PRACTITIONER

## 2023-02-28 PROCEDURE — 1126F AMNT PAIN NOTED NONE PRSNT: CPT | Performed by: NURSE PRACTITIONER

## 2023-02-28 PROCEDURE — 80061 LIPID PANEL: CPT

## 2023-02-28 PROCEDURE — 81003 URINALYSIS AUTO W/O SCOPE: CPT

## 2023-02-28 PROCEDURE — 1170F FXNL STATUS ASSESSED: CPT | Performed by: NURSE PRACTITIONER

## 2023-02-28 PROCEDURE — 1125F AMNT PAIN NOTED PAIN PRSNT: CPT | Performed by: NURSE PRACTITIONER

## 2023-02-28 PROCEDURE — G0439 PPPS, SUBSEQ VISIT: HCPCS | Performed by: NURSE PRACTITIONER

## 2023-02-28 PROCEDURE — 80053 COMPREHEN METABOLIC PANEL: CPT

## 2023-02-28 PROCEDURE — 1159F MED LIST DOCD IN RCRD: CPT | Performed by: NURSE PRACTITIONER

## 2023-02-28 RX ORDER — HYDROCHLOROTHIAZIDE 25 MG/1
25 TABLET ORAL DAILY
Qty: 90 TABLET | Refills: 1 | Status: SHIPPED | OUTPATIENT
Start: 2023-02-28

## 2023-02-28 RX ORDER — AMLODIPINE BESYLATE 5 MG/1
5 TABLET ORAL DAILY
Qty: 90 TABLET | Refills: 1 | Status: SHIPPED | OUTPATIENT
Start: 2023-02-28

## 2023-02-28 RX ORDER — OMEPRAZOLE 40 MG/1
40 CAPSULE, DELAYED RELEASE ORAL DAILY
Qty: 90 CAPSULE | Refills: 3 | Status: SHIPPED | OUTPATIENT
Start: 2023-02-28

## 2023-02-28 RX ORDER — ATORVASTATIN CALCIUM 20 MG/1
20 TABLET, FILM COATED ORAL DAILY
Qty: 90 TABLET | Refills: 1 | Status: SHIPPED | OUTPATIENT
Start: 2023-02-28

## 2023-02-28 RX ORDER — LOSARTAN POTASSIUM 50 MG/1
50 TABLET ORAL DAILY
Qty: 90 TABLET | Refills: 1 | Status: SHIPPED | OUTPATIENT
Start: 2023-02-28

## 2023-02-28 NOTE — PROGRESS NOTES
The ABCs of the Annual Wellness Visit  Subsequent Medicare Wellness Visit    Subjective      Ana Alaniz is a 68 y.o. female who presents for a Subsequent Medicare Wellness Visit.    The following portions of the patient's history were reviewed and   updated as appropriate: allergies, current medications, past family history, past medical history, past social history, past surgical history and problem list.    Compared to one year ago, the patient feels her physical   health is the same.    Compared to one year ago, the patient feels her mental   health is the same.    Recent Hospitalizations:  She was not admitted to the hospital during the last year.       Current Medical Providers:  Patient Care Team:  Omaira Edwards APRN as PCP - General (Family Medicine)  Dia Ramey APRN as Nurse Practitioner (Nurse Practitioner)    Outpatient Medications Prior to Visit   Medication Sig Dispense Refill   • Calcium Carb-Cholecalciferol 600-500 MG-UNIT capsule Take  by mouth.     • fexofenadine (ALLEGRA) 180 MG tablet Take 1 tablet by mouth Daily. 90 tablet 3   • Flaxseed, Linseed, (FLAX SEED OIL) 1300 MG capsule Take 1 tablet by mouth Daily.     • Glucosamine-Chondroit-Vit C-Mn (GLUCOSAMINE CHONDR 1500 COMPLX) capsule Take 1,500 mg by mouth 2 (Two) Times a Day.     • Lutein-Zeaxanthin 25-5 MG capsule Take 1 tablet by mouth Daily.     • Multiple Vitamins-Minerals (PRESERVISION AREDS 2 PO) Take  by mouth Daily.     • Omega-3 Fatty Acids (OMEGA-3 FISH OIL) 500 MG capsule Take 500 mg by mouth Daily.     • amLODIPine (NORVASC) 5 MG tablet Take 1 tablet by mouth Daily. 90 tablet 1   • atorvastatin (Lipitor) 20 MG tablet Take 1 tablet by mouth Daily. 90 tablet 1   • hydroCHLOROthiazide (HYDRODIURIL) 25 MG tablet Take 1 tablet by mouth Daily. 90 tablet 1   • losartan (COZAAR) 50 MG tablet Take 1 tablet by mouth Daily. 90 tablet 1   • omeprazole (priLOSEC) 40 MG capsule Take 1 capsule by mouth Daily. 90 capsule 3   • ketorolac  "(ACULAR) 0.5 % ophthalmic solution        No facility-administered medications prior to visit.       No opioid medication identified on active medication list. I have reviewed chart for other potential  high risk medication/s and harmful drug interactions in the elderly.          Aspirin is not on active medication list.  Aspirin use is not indicated based on review of current medical condition/s. Risk of harm outweighs potential benefits.  .    Patient Active Problem List   Diagnosis   • Severe obstructive sleep apnea   • Hypertension   • Degenerative joint disease   • Chronic rhinitis   • Hyperlipidemia   • Gastroesophageal reflux disease   • Medicare welcome visit   • Osteopenia of neck of left femur     Advance Care Planning  Advance Directive is not on file.  ACP discussion was held with the patient during this visit. Patient has an advance directive (not in EMR), copy requested.     Objective    Vitals:    02/28/23 1035   BP: 136/80   Pulse: 64   Resp: 21   Temp: 98.7 °F (37.1 °C)   TempSrc: Temporal   SpO2: 98%   Weight: 81.4 kg (179 lb 6.4 oz)   Height: 162.6 cm (64.02\")     Estimated body mass index is 30.78 kg/m² as calculated from the following:    Height as of this encounter: 162.6 cm (64.02\").    Weight as of this encounter: 81.4 kg (179 lb 6.4 oz).    BMI is >= 30 and <35. (Class 1 Obesity). The following options were offered after discussion;: exercise counseling/recommendations and nutrition counseling/recommendations      Does the patient have evidence of cognitive impairment?   No            HEALTH RISK ASSESSMENT    Smoking Status:  Social History     Tobacco Use   Smoking Status Never   Smokeless Tobacco Never     Alcohol Consumption:  Social History     Substance and Sexual Activity   Alcohol Use Yes   • Alcohol/week: 1.0 - 2.0 standard drink   • Types: 1 - 2 Glasses of wine per week    Comment: nightly     Fall Risk Screen:    STEADI Fall Risk Assessment was completed, and patient is at " MODERATE risk for falls. Assessment completed on:2/28/2023    Depression Screening:  PHQ-2/PHQ-9 Depression Screening 2/28/2023   Little Interest or Pleasure in Doing Things 0-->not at all   Feeling Down, Depressed or Hopeless 0-->not at all   PHQ-9: Brief Depression Severity Measure Score 0       Health Habits and Functional and Cognitive Screening:  Functional & Cognitive Status 2/28/2023   Do you have difficulty preparing food and eating? No   Do you have difficulty bathing yourself, getting dressed or grooming yourself? No   Do you have difficulty using the toilet? No   Do you have difficulty moving around from place to place? No   Do you have trouble with steps or getting out of a bed or a chair? No   Current Diet Well Balanced Diet   Dental Exam Up to date   Eye Exam Up to date   Exercise (times per week) 5 times per week   Current Exercises Include Walking   Current Exercise Activities Include -   Do you need help using the phone?  No   Are you deaf or do you have serious difficulty hearing?  No   Do you need help with transportation? No   Do you need help shopping? No   Do you need help preparing meals?  No   Do you need help with housework?  No   Do you need help with laundry? No   Do you need help taking your medications? No   Do you need help managing money? No   Do you ever drive or ride in a car without wearing a seat belt? No   Have you felt unusual stress, anger or loneliness in the last month? No   Who do you live with? Spouse   If you need help, do you have trouble finding someone available to you? No   Have you been bothered in the last four weeks by sexual problems? No   Do you have difficulty concentrating, remembering or making decisions? No       Age-appropriate Screening Schedule:  Refer to the list below for future screening recommendations based on patient's age, sex and/or medical conditions. Orders for these recommended tests are listed in the plan section. The patient has been provided  with a written plan.    Health Maintenance   Topic Date Due   • LIPID PANEL  02/25/2023   • ANNUAL WELLNESS VISIT  02/28/2024   • DXA SCAN  05/20/2024   • MAMMOGRAM  07/12/2024   • COLORECTAL CANCER SCREENING  09/07/2028   • HEPATITIS C SCREENING  Completed   • COVID-19 Vaccine  Completed   • INFLUENZA VACCINE  Completed   • Pneumococcal Vaccine 65+  Completed   • PAP SMEAR  Discontinued   • ZOSTER VACCINE  Discontinued                CMS Preventative Services Quick Reference  Risk Factors Identified During Encounter:    Fall Risk-High or Moderate: Discussed Fall Prevention in the home    The above risks/problems have been discussed with the patient.  Pertinent information has been shared with the patient in the After Visit Summary.    Diagnoses and all orders for this visit:    1. Screening mammogram for breast cancer (Primary)  -     Mammo Screening Digital Tomosynthesis Bilateral With CAD; Future    2. Hypertension, unspecified type  -     losartan (COZAAR) 50 MG tablet; Take 1 tablet by mouth Daily.  Dispense: 90 tablet; Refill: 1  -     amLODIPine (NORVASC) 5 MG tablet; Take 1 tablet by mouth Daily.  Dispense: 90 tablet; Refill: 1  -     hydroCHLOROthiazide (HYDRODIURIL) 25 MG tablet; Take 1 tablet by mouth Daily.  Dispense: 90 tablet; Refill: 1  -     Comprehensive Metabolic Panel; Future  -     Urinalysis With Culture If Indicated - Urine, Clean Catch; Future  Chronic-stable  3. Essential hypertension  -     losartan (COZAAR) 50 MG tablet; Take 1 tablet by mouth Daily.  Dispense: 90 tablet; Refill: 1  -     amLODIPine (NORVASC) 5 MG tablet; Take 1 tablet by mouth Daily.  Dispense: 90 tablet; Refill: 1  -     hydroCHLOROthiazide (HYDRODIURIL) 25 MG tablet; Take 1 tablet by mouth Daily.  Dispense: 90 tablet; Refill: 1  Chronic-stable  4. Mixed hyperlipidemia  -     atorvastatin (Lipitor) 20 MG tablet; Take 1 tablet by mouth Daily.  Dispense: 90 tablet; Refill: 1  -     Lipid Panel; Future  Chronic-stable  5.  Gastroesophageal reflux disease without esophagitis  -     omeprazole (priLOSEC) 40 MG capsule; Take 1 capsule by mouth Daily.  Dispense: 90 capsule; Refill: 3  Chronic-stable  Dental and eye up to date.     Follow Up:   Next Medicare Wellness visit to be scheduled in 1 year.      An After Visit Summary and PPPS were made available to the patient.

## 2023-08-24 ENCOUNTER — HOSPITAL ENCOUNTER (OUTPATIENT)
Dept: ULTRASOUND IMAGING | Facility: HOSPITAL | Age: 68
Discharge: HOME | End: 2023-08-24
Payer: MEDICARE

## 2023-08-24 ENCOUNTER — HOSPITAL ENCOUNTER (OUTPATIENT)
Dept: MAMMOGRAPHY | Facility: HOSPITAL | Age: 68
Discharge: HOME | End: 2023-08-24
Payer: MEDICARE

## 2023-08-24 DIAGNOSIS — R92.8 ABNORMAL MAMMOGRAM: ICD-10-CM

## 2023-08-24 PROCEDURE — G0279 TOMOSYNTHESIS, MAMMO: HCPCS

## 2023-08-24 PROCEDURE — 76642 ULTRASOUND BREAST LIMITED: CPT

## 2023-08-24 PROCEDURE — 77066 DX MAMMO INCL CAD BI: CPT

## 2023-08-29 ENCOUNTER — LAB (OUTPATIENT)
Dept: LAB | Facility: HOSPITAL | Age: 68
End: 2023-08-29
Payer: MEDICARE

## 2023-08-29 ENCOUNTER — OFFICE VISIT (OUTPATIENT)
Dept: FAMILY MEDICINE CLINIC | Facility: CLINIC | Age: 68
End: 2023-08-29
Payer: MEDICARE

## 2023-08-29 VITALS
OXYGEN SATURATION: 99 % | BODY MASS INDEX: 30.87 KG/M2 | HEART RATE: 75 BPM | SYSTOLIC BLOOD PRESSURE: 128 MMHG | WEIGHT: 180.8 LBS | DIASTOLIC BLOOD PRESSURE: 74 MMHG | HEIGHT: 64 IN | TEMPERATURE: 98.6 F

## 2023-08-29 DIAGNOSIS — I10 HYPERTENSION, UNSPECIFIED TYPE: ICD-10-CM

## 2023-08-29 DIAGNOSIS — I10 HYPERTENSION, UNSPECIFIED TYPE: Primary | ICD-10-CM

## 2023-08-29 DIAGNOSIS — E78.2 MIXED HYPERLIPIDEMIA: ICD-10-CM

## 2023-08-29 DIAGNOSIS — I10 ESSENTIAL HYPERTENSION: ICD-10-CM

## 2023-08-29 PROCEDURE — 99214 OFFICE O/P EST MOD 30 MIN: CPT | Performed by: NURSE PRACTITIONER

## 2023-08-29 PROCEDURE — 1159F MED LIST DOCD IN RCRD: CPT | Performed by: NURSE PRACTITIONER

## 2023-08-29 PROCEDURE — 3074F SYST BP LT 130 MM HG: CPT | Performed by: NURSE PRACTITIONER

## 2023-08-29 PROCEDURE — 1160F RVW MEDS BY RX/DR IN RCRD: CPT | Performed by: NURSE PRACTITIONER

## 2023-08-29 PROCEDURE — 80061 LIPID PANEL: CPT

## 2023-08-29 PROCEDURE — 3078F DIAST BP <80 MM HG: CPT | Performed by: NURSE PRACTITIONER

## 2023-08-29 PROCEDURE — 80053 COMPREHEN METABOLIC PANEL: CPT

## 2023-08-29 RX ORDER — HYDROCHLOROTHIAZIDE 25 MG/1
25 TABLET ORAL DAILY
Qty: 90 TABLET | Refills: 1 | Status: SHIPPED | OUTPATIENT
Start: 2023-08-29

## 2023-08-29 RX ORDER — LOSARTAN POTASSIUM 50 MG/1
50 TABLET ORAL DAILY
Qty: 90 TABLET | Refills: 1 | Status: SHIPPED | OUTPATIENT
Start: 2023-08-29

## 2023-08-29 RX ORDER — ATORVASTATIN CALCIUM 20 MG/1
20 TABLET, FILM COATED ORAL DAILY
Qty: 90 TABLET | Refills: 1 | Status: SHIPPED | OUTPATIENT
Start: 2023-08-29

## 2023-08-29 RX ORDER — MV-MIN/FA/VIT K/LUTEIN/ZEAXANT 200MCG-5MG
CAPSULE ORAL
COMMUNITY

## 2023-08-29 RX ORDER — AMLODIPINE BESYLATE 5 MG/1
5 TABLET ORAL DAILY
Qty: 90 TABLET | Refills: 1 | Status: SHIPPED | OUTPATIENT
Start: 2023-08-29

## 2023-08-29 NOTE — PROGRESS NOTES
"Answers submitted by the patient for this visit:  Primary Reason for Visit (Submitted on 8/27/2023)  What is the primary reason for your visit?: High Blood Pressure  Chief Complaint  Hypertension (Per patient she is here for 6 month follow up)    Subjective          Ana Alaniz presents to [facility]  History of Present Illness  Patient is a 68-year-old female.  She is here for follow-up on hypertension, GERD, and hyperlipidemia.   Her blood pressure today is stable at 128/74.  She denies any chest pain, shortness of breath or leg swelling.  She does states she has been tolerating medication well. She is requesting medication refills.       Discussed her recent mammogram. She has follow up already scheduled in March.         Hypertension  This is a chronic problem. The current episode started more than 1 year ago. The problem has been gradually improving since onset. The problem is controlled. Risk factors for coronary artery disease include dyslipidemia and family history. Past treatments include ACE inhibitors, diuretics and lifestyle changes. Current antihypertension treatment includes ACE inhibitors, lifestyle changes and diuretics.     The following portions of the patient's history were reviewed and updated as appropriate: allergies, current medications, past family history, past medical history, past social history, past surgical history and problem list.          Objective   Vital Signs:   /74   Pulse 75   Temp 98.6 øF (37 øC) (Infrared)   Ht 162.6 cm (64.02\")   Wt 82 kg (180 lb 12.8 oz)   SpO2 99%   BMI 31.02 kg/mý           PHQ-2/9 Depression Screening  PHQ-9 Total Score:      LUIS-7 Anxiety Screening  LUIS-7             Physical Exam  Vitals reviewed.   HENT:      Head: Normocephalic.      Nose: Nose normal.      Mouth/Throat:      Mouth: Mucous membranes are moist.   Eyes:      Pupils: Pupils are equal, round, and reactive to light.   Cardiovascular:      " Rate and Rhythm: Normal rate and regular rhythm.   Pulmonary:      Effort: Pulmonary effort is normal.   Abdominal:      Palpations: Abdomen is soft.   Musculoskeletal:         General: Normal range of motion.   Skin:     General: Skin is warm and dry.      Capillary Refill: Capillary refill takes less than 2 seconds.   Neurological:      Mental Status: She is alert and oriented to person, place, and time.   Psychiatric:         Mood and Affect: Mood normal.         Behavior: Behavior normal.      Result Review :                 Assessment and Plan    Diagnoses and all orders for this visit:    1. Hypertension, unspecified type (Primary)  -     Comprehensive Metabolic Panel; Future  -     amLODIPine (NORVASC) 5 MG tablet; Take 1 tablet by mouth Daily.  Dispense: 90 tablet; Refill: 1  -     hydroCHLOROthiazide (HYDRODIURIL) 25 MG tablet; Take 1 tablet by mouth Daily.  Dispense: 90 tablet; Refill: 1  -     losartan (COZAAR) 50 MG tablet; Take 1 tablet by mouth Daily.  Dispense: 90 tablet; Refill: 1    2. Essential hypertension  -     Comprehensive Metabolic Panel; Future  -     amLODIPine (NORVASC) 5 MG tablet; Take 1 tablet by mouth Daily.  Dispense: 90 tablet; Refill: 1  -     hydroCHLOROthiazide (HYDRODIURIL) 25 MG tablet; Take 1 tablet by mouth Daily.  Dispense: 90 tablet; Refill: 1  -     losartan (COZAAR) 50 MG tablet; Take 1 tablet by mouth Daily.  Dispense: 90 tablet; Refill: 1    3. Mixed hyperlipidemia  -     Lipid Panel; Future  -     atorvastatin (Lipitor) 20 MG tablet; Take 1 tablet by mouth Daily.  Dispense: 90 tablet; Refill: 1        Checking labs   Medication refilled.       Follow Up   Return for Annual, Medicare Wellness.  Patient was given instructions and counseling regarding her condition or for health maintenance advice. Please see specific information pulled into the AVS if appropriate.

## 2023-08-29 NOTE — PROGRESS NOTES
"Chief Complaint  Hypertension (Per patient she is here for 6 month follow up)    Subjective     {Problem List  Visit Diagnosis   Encounters  Notes  Medications  Labs  Result Review Imaging  Media :23}     Ana Alaniz presents to [facility]  History of Present Illness  Patient is a 68-year-old female.  She is here for follow-up on hypertension.  Her blood pressure today is stable at 128/74.      Hypertension  This is a chronic problem. The current episode started more than 1 year ago.     The following portions of the patient's history were reviewed and updated as appropriate: allergies, current medications, past family history, past medical history, past social history, past surgical history and problem list.    Objective   Vital Signs:   /74   Pulse 75   Temp 98.6 øF (37 øC) (Infrared)   Ht 162.6 cm (64.02\")   Wt 82 kg (180 lb 12.8 oz)   SpO2 99%   BMI 31.02 kg/mý           PHQ-2/9 Depression Screening  PHQ-9 Total Score:      LUIS-7 Anxiety Screening  LUIS-7             Physical Exam   Result Review :{Labs  Result Review  Imaging  Med Tab  Media :23}   {The following data was reviewed by (Optional):73086}  {Ambulatory Labs (Optional):60381}  {Data reviewed (Optional):52746:::1}          Assessment and Plan {CC Problem List  Visit Diagnosis  ROS  Review (Popup)  Health Maintenance  Quality  BestPractice  Medications  SmartSets  SnapShot Encounters  Media :23}   Diagnoses and all orders for this visit:    1. Hypertension, unspecified type (Primary)  -     Comprehensive Metabolic Panel; Future  -     amLODIPine (NORVASC) 5 MG tablet; Take 1 tablet by mouth Daily.  Dispense: 90 tablet; Refill: 1  -     hydroCHLOROthiazide (HYDRODIURIL) 25 MG tablet; Take 1 tablet by mouth Daily.  Dispense: 90 tablet; Refill: 1  -     losartan (COZAAR) 50 MG tablet; Take 1 tablet by mouth Daily.  Dispense: 90 tablet; Refill: 1    2. Essential hypertension  -     " Comprehensive Metabolic Panel; Future  -     amLODIPine (NORVASC) 5 MG tablet; Take 1 tablet by mouth Daily.  Dispense: 90 tablet; Refill: 1  -     hydroCHLOROthiazide (HYDRODIURIL) 25 MG tablet; Take 1 tablet by mouth Daily.  Dispense: 90 tablet; Refill: 1  -     losartan (COZAAR) 50 MG tablet; Take 1 tablet by mouth Daily.  Dispense: 90 tablet; Refill: 1    3. Mixed hyperlipidemia  -     Lipid Panel; Future  -     atorvastatin (Lipitor) 20 MG tablet; Take 1 tablet by mouth Daily.  Dispense: 90 tablet; Refill: 1    Answers submitted by the patient for this visit:  Primary Reason for Visit (Submitted on 8/27/2023)  What is the primary reason for your visit?: High Blood Pressure    {Time Spent (Optional):28089}  Follow Up {Instructions Charge Capture  Follow-up Communications :23}  Return for Annual, Medicare Wellness.  Patient was given instructions and counseling regarding her condition or for health maintenance advice. Please see specific information pulled into the AVS if appropriate.

## 2023-08-30 LAB
ALBUMIN SERPL-MCNC: 4.9 G/DL (ref 3.5–5.2)
ALBUMIN/GLOB SERPL: 1.7 G/DL
ALP SERPL-CCNC: 76 U/L (ref 39–117)
ALT SERPL W P-5'-P-CCNC: 20 U/L (ref 1–33)
ANION GAP SERPL CALCULATED.3IONS-SCNC: 14.8 MMOL/L (ref 5–15)
AST SERPL-CCNC: 28 U/L (ref 1–32)
BILIRUB SERPL-MCNC: 0.8 MG/DL (ref 0–1.2)
BUN SERPL-MCNC: 12 MG/DL (ref 8–23)
BUN/CREAT SERPL: 16.7 (ref 7–25)
CALCIUM SPEC-SCNC: 10.2 MG/DL (ref 8.6–10.5)
CHLORIDE SERPL-SCNC: 99 MMOL/L (ref 98–107)
CHOLEST SERPL-MCNC: 216 MG/DL (ref 0–200)
CO2 SERPL-SCNC: 25.2 MMOL/L (ref 22–29)
CREAT SERPL-MCNC: 0.72 MG/DL (ref 0.57–1)
EGFRCR SERPLBLD CKD-EPI 2021: 90.1 ML/MIN/1.73
GLOBULIN UR ELPH-MCNC: 2.9 GM/DL
GLUCOSE SERPL-MCNC: 93 MG/DL (ref 65–99)
HDLC SERPL-MCNC: 101 MG/DL (ref 40–60)
LDLC SERPL CALC-MCNC: 100 MG/DL (ref 0–100)
LDLC/HDLC SERPL: 0.96 {RATIO}
POTASSIUM SERPL-SCNC: 3.9 MMOL/L (ref 3.5–5.2)
PROT SERPL-MCNC: 7.8 G/DL (ref 6–8.5)
SODIUM SERPL-SCNC: 139 MMOL/L (ref 136–145)
TRIGL SERPL-MCNC: 88 MG/DL (ref 0–150)
VLDLC SERPL-MCNC: 15 MG/DL (ref 5–40)

## 2023-09-06 DIAGNOSIS — E78.2 MIXED HYPERLIPIDEMIA: ICD-10-CM

## 2023-09-06 RX ORDER — ATORVASTATIN CALCIUM 20 MG/1
TABLET, FILM COATED ORAL
Qty: 90 TABLET | Refills: 1 | Status: SHIPPED | OUTPATIENT
Start: 2023-09-06

## 2023-09-12 NOTE — PROGRESS NOTES
Please call patient regarding her abnormal labs.   Your cholesterol was elevated. You will need to avoid fat and fried foods.   Increase your intake of fiber. Increase your exercise to 30 minutes 3-4 times a week.   We can discuss increasing your lipitor to 40 mg daily.   Will need to discuss at your next visit.   Your glucose, kidney function and liver functions are normal.

## 2023-09-13 ENCOUNTER — TELEPHONE (OUTPATIENT)
Dept: FAMILY MEDICINE CLINIC | Facility: CLINIC | Age: 68
End: 2023-09-13
Payer: MEDICARE

## 2023-09-13 NOTE — TELEPHONE ENCOUNTER
Okay for the HUB to read:     Please call patient regarding her abnormal labs.   Your cholesterol was elevated. You will need to avoid fat and fried foods.   Increase your intake of fiber. Increase your exercise to 30 minutes 3-4 times a week.   We can discuss increasing your lipitor to 40 mg daily.   Will need to discuss at your next visit.   Your glucose, kidney function and liver functions are normal.

## 2023-10-14 PROBLEM — K63.5 SESSILE COLONIC POLYP: Status: ACTIVE | Noted: 2023-10-14

## 2023-10-14 PROBLEM — K64.8 INTERNAL HEMORRHOID: Status: ACTIVE | Noted: 2023-10-14

## 2024-03-04 ENCOUNTER — HOSPITAL ENCOUNTER (OUTPATIENT)
Dept: MAMMOGRAPHY | Facility: HOSPITAL | Age: 69
Discharge: HOME | End: 2024-03-04
Admitting: RADIOLOGY
Payer: MEDICARE

## 2024-03-04 DIAGNOSIS — R92.8 ABNORMAL MAMMOGRAM: ICD-10-CM

## 2024-03-04 PROCEDURE — 77065 DX MAMMO INCL CAD UNI: CPT | Performed by: RADIOLOGY

## 2024-03-04 PROCEDURE — 77065 DX MAMMO INCL CAD UNI: CPT

## 2024-03-08 ENCOUNTER — LAB (OUTPATIENT)
Dept: LAB | Facility: HOSPITAL | Age: 69
End: 2024-03-08
Payer: MEDICARE

## 2024-03-08 ENCOUNTER — OFFICE VISIT (OUTPATIENT)
Dept: FAMILY MEDICINE CLINIC | Facility: CLINIC | Age: 69
End: 2024-03-08
Payer: MEDICARE

## 2024-03-08 VITALS
HEART RATE: 76 BPM | WEIGHT: 175 LBS | OXYGEN SATURATION: 97 % | HEIGHT: 64 IN | DIASTOLIC BLOOD PRESSURE: 80 MMHG | BODY MASS INDEX: 29.88 KG/M2 | SYSTOLIC BLOOD PRESSURE: 130 MMHG | TEMPERATURE: 98.5 F

## 2024-03-08 DIAGNOSIS — Z00.00 MEDICARE ANNUAL WELLNESS VISIT, SUBSEQUENT: ICD-10-CM

## 2024-03-08 DIAGNOSIS — F41.9 ANXIETY: ICD-10-CM

## 2024-03-08 DIAGNOSIS — M85.852 OSTEOPENIA OF NECK OF LEFT FEMUR: Chronic | ICD-10-CM

## 2024-03-08 DIAGNOSIS — Z00.00 MEDICARE ANNUAL WELLNESS VISIT, SUBSEQUENT: Primary | ICD-10-CM

## 2024-03-08 DIAGNOSIS — I10 HYPERTENSION, UNSPECIFIED TYPE: ICD-10-CM

## 2024-03-08 DIAGNOSIS — E78.2 MIXED HYPERLIPIDEMIA: ICD-10-CM

## 2024-03-08 DIAGNOSIS — K21.9 GASTROESOPHAGEAL REFLUX DISEASE WITHOUT ESOPHAGITIS: ICD-10-CM

## 2024-03-08 DIAGNOSIS — I10 ESSENTIAL HYPERTENSION: ICD-10-CM

## 2024-03-08 LAB
ALBUMIN SERPL-MCNC: 4.8 G/DL (ref 3.5–5.2)
ALBUMIN/GLOB SERPL: 1.8 G/DL
ALP SERPL-CCNC: 79 U/L (ref 39–117)
ALT SERPL W P-5'-P-CCNC: 24 U/L (ref 1–33)
ANION GAP SERPL CALCULATED.3IONS-SCNC: 14 MMOL/L (ref 5–15)
AST SERPL-CCNC: 28 U/L (ref 1–32)
BILIRUB SERPL-MCNC: 0.6 MG/DL (ref 0–1.2)
BILIRUB UR QL STRIP: NEGATIVE
BUN SERPL-MCNC: 14 MG/DL (ref 8–23)
BUN/CREAT SERPL: 17.9 (ref 7–25)
CALCIUM SPEC-SCNC: 10.4 MG/DL (ref 8.6–10.5)
CHLORIDE SERPL-SCNC: 99 MMOL/L (ref 98–107)
CLARITY UR: CLEAR
CO2 SERPL-SCNC: 27 MMOL/L (ref 22–29)
COLOR UR: YELLOW
CREAT SERPL-MCNC: 0.78 MG/DL (ref 0.57–1)
EGFRCR SERPLBLD CKD-EPI 2021: 81.3 ML/MIN/1.73
GLOBULIN UR ELPH-MCNC: 2.7 GM/DL
GLUCOSE SERPL-MCNC: 118 MG/DL (ref 65–99)
GLUCOSE UR STRIP-MCNC: NEGATIVE MG/DL
HGB UR QL STRIP.AUTO: NEGATIVE
HOLD SPECIMEN: NORMAL
KETONES UR QL STRIP: ABNORMAL
LEUKOCYTE ESTERASE UR QL STRIP.AUTO: NEGATIVE
NITRITE UR QL STRIP: NEGATIVE
PH UR STRIP.AUTO: 6.5 [PH] (ref 5–8)
POTASSIUM SERPL-SCNC: 4.3 MMOL/L (ref 3.5–5.2)
PROT SERPL-MCNC: 7.5 G/DL (ref 6–8.5)
PROT UR QL STRIP: NEGATIVE
SODIUM SERPL-SCNC: 140 MMOL/L (ref 136–145)
SP GR UR STRIP: 1.01 (ref 1–1.03)
TSH SERPL DL<=0.05 MIU/L-ACNC: 1.95 UIU/ML (ref 0.27–4.2)
UROBILINOGEN UR QL STRIP: ABNORMAL

## 2024-03-08 PROCEDURE — 84443 ASSAY THYROID STIM HORMONE: CPT

## 2024-03-08 PROCEDURE — 80053 COMPREHEN METABOLIC PANEL: CPT

## 2024-03-08 PROCEDURE — 81003 URINALYSIS AUTO W/O SCOPE: CPT

## 2024-03-08 RX ORDER — AMLODIPINE BESYLATE 5 MG/1
5 TABLET ORAL DAILY
Qty: 90 TABLET | Refills: 1 | Status: SHIPPED | OUTPATIENT
Start: 2024-03-08

## 2024-03-08 RX ORDER — HYDROCHLOROTHIAZIDE 25 MG/1
25 TABLET ORAL DAILY
Qty: 90 TABLET | Refills: 1 | Status: SHIPPED | OUTPATIENT
Start: 2024-03-08

## 2024-03-08 RX ORDER — OMEPRAZOLE 40 MG/1
40 CAPSULE, DELAYED RELEASE ORAL DAILY
Qty: 90 CAPSULE | Refills: 3 | Status: SHIPPED | OUTPATIENT
Start: 2024-03-08

## 2024-03-08 RX ORDER — PROPRANOLOL HYDROCHLORIDE 10 MG/1
10 TABLET ORAL 2 TIMES DAILY PRN
Qty: 40 TABLET | Refills: 1 | Status: SHIPPED | OUTPATIENT
Start: 2024-03-08

## 2024-03-08 RX ORDER — LOSARTAN POTASSIUM 50 MG/1
50 TABLET ORAL DAILY
Qty: 90 TABLET | Refills: 1 | Status: SHIPPED | OUTPATIENT
Start: 2024-03-08

## 2024-03-08 RX ORDER — ATORVASTATIN CALCIUM 40 MG/1
40 TABLET, FILM COATED ORAL DAILY
Qty: 90 TABLET | Refills: 1 | Status: SHIPPED | OUTPATIENT
Start: 2024-03-08

## 2024-03-08 NOTE — PROGRESS NOTES
The ABCs of the Annual Wellness Visit  Subsequent Medicare Wellness Visit    Subjective    Ana Alaniz is a 69 y.o. female who presents for a Subsequent Medicare Wellness Visit.    The following portions of the patient's history were reviewed and   updated as appropriate: allergies, current medications, past family history, past medical history, past social history, past surgical history, and problem list.    Compared to one year ago, the patient feels her physical   health is worse.    Compared to one year ago, the patient feels her mental   health is the same.    Recent Hospitalizations:  She was not admitted to the hospital during the last year.       Current Medical Providers:  Patient Care Team:  Omaira Edwards APRN as PCP - General (Family Medicine)  Dia Ramey APRN as Nurse Practitioner (Nurse Practitioner)  Miguel Navas MD as Consulting Physician (Colon and Rectal Surgery)    Outpatient Medications Prior to Visit   Medication Sig Dispense Refill    Calcium Carb-Cholecalciferol 600-500 MG-UNIT capsule Take  by mouth.      fexofenadine (ALLEGRA) 180 MG tablet Take 1 tablet by mouth Daily. 90 tablet 3    Flaxseed, Linseed, (FLAX SEED OIL) 1300 MG capsule Take 1 tablet by mouth Daily.      Glucosamine-Chondroit-Vit C-Mn (GLUCOSAMINE CHONDR 1500 COMPLX) capsule Take 1,500 mg by mouth 2 (Two) Times a Day.      Lutein-Zeaxanthin 25-5 MG capsule Take 1 tablet by mouth Daily.      Multiple Vitamins-Minerals (PRESERVISION AREDS 2 PO) Take  by mouth Daily.      Multiple Vitamins-Minerals (PreserVision AREDS 2+Multi Vit) capsule       Omega-3 Fatty Acids (OMEGA-3 FISH OIL) 500 MG capsule Take 1 capsule by mouth Daily.      amLODIPine (NORVASC) 5 MG tablet Take 1 tablet by mouth Daily. 90 tablet 1    atorvastatin (LIPITOR) 20 MG tablet TAKE ONE TABLET BY MOUTH DAILY 90 tablet 1    hydroCHLOROthiazide (HYDRODIURIL) 25 MG tablet Take 1 tablet by mouth Daily. 90 tablet 1    losartan (COZAAR) 50 MG tablet Take  "1 tablet by mouth Daily. 90 tablet 1    omeprazole (priLOSEC) 40 MG capsule Take 1 capsule by mouth Daily. 90 capsule 3     No facility-administered medications prior to visit.       No opioid medication identified on active medication list. I have reviewed chart for other potential  high risk medication/s and harmful drug interactions in the elderly.        Aspirin is not on active medication list.  Aspirin use is not indicated based on review of current medical condition/s. Risk of harm outweighs potential benefits.  .    Patient Active Problem List   Diagnosis    Severe obstructive sleep apnea    Hypertension    Degenerative arthritis    Chronic rhinitis    Hyperlipidemia    Gastroesophageal reflux disease    Medicare welcome visit    Osteopenia of neck of left femur    Abnormal mammogram    Sessile colonic polyp    Internal hemorrhoid     Advance Care Planning   Advance Care Planning     Advance Directive is not on file.  ACP discussion was held with the patient during this visit. Patient has an advance directive (not in EMR), copy requested.     Objective    Vitals:    03/08/24 1030   BP: 130/80   BP Location: Left arm   Patient Position: Sitting   Cuff Size: Adult   Pulse: 76   Temp: 98.5 °F (36.9 °C)   TempSrc: Temporal   SpO2: 97%   Weight: 79.4 kg (175 lb)   Height: 162.6 cm (64.02\")   PainSc: 0-No pain     Estimated body mass index is 30.02 kg/m² as calculated from the following:    Height as of this encounter: 162.6 cm (64.02\").    Weight as of this encounter: 79.4 kg (175 lb).    BMI is >= 30 and <35. (Class 1 Obesity). The following options were offered after discussion;: exercise counseling/recommendations and nutrition counseling/recommendations      Does the patient have evidence of cognitive impairment? No  ACE Mini Mental Exam  30/30        HEALTH RISK ASSESSMENT    Smoking Status:  Social History     Tobacco Use   Smoking Status Never   Smokeless Tobacco Never     Alcohol Consumption:  Social " History     Substance and Sexual Activity   Alcohol Use Yes    Alcohol/week: 1.0 - 2.0 standard drink of alcohol    Types: 1 - 2 Glasses of wine per week    Comment: nightly     Fall Risk Screen:    MICHAEL Fall Risk Assessment was completed, and patient is at LOW risk for falls.Assessment completed on:3/8/2024    Depression Screening:      3/8/2024    10:40 AM   PHQ-2/PHQ-9 Depression Screening   Little Interest or Pleasure in Doing Things 0-->not at all   Feeling Down, Depressed or Hopeless 1-->several days   PHQ-9: Brief Depression Severity Measure Score 1       Health Habits and Functional and Cognitive Screening:      3/8/2024    10:42 AM   Functional & Cognitive Status   Do you have difficulty preparing food and eating? No   Do you have difficulty bathing yourself, getting dressed or grooming yourself? No   Do you have difficulty using the toilet? No   Do you have difficulty moving around from place to place? No   Do you have trouble with steps or getting out of a bed or a chair? No   Current Diet Well Balanced Diet   Dental Exam Up to date   Eye Exam Up to date   Exercise (times per week) 0 times per week   Current Exercises Include Walking   Do you need help using the phone?  No   Are you deaf or do you have serious difficulty hearing?  No   Do you need help to go to places out of walking distance? No   Do you need help shopping? No   Do you need help preparing meals?  No   Do you need help with housework?  No   Do you need help with laundry? No   Do you need help taking your medications? No   Do you need help managing money? No   Do you ever drive or ride in a car without wearing a seat belt? No   Have you felt unusual stress, anger or loneliness in the last month? No   Who do you live with? Spouse   If you need help, do you have trouble finding someone available to you? No   Have you been bothered in the last four weeks by sexual problems? No   Do you have difficulty concentrating, remembering or making  decisions? No       Age-appropriate Screening Schedule:  Refer to the list below for future screening recommendations based on patient's age, sex and/or medical conditions. Orders for these recommended tests are listed in the plan section. The patient has been provided with a written plan.    Health Maintenance   Topic Date Due    DXA SCAN  05/20/2024    LIPID PANEL  08/29/2024    ANNUAL WELLNESS VISIT  03/08/2025    BMI FOLLOWUP  03/08/2025    MAMMOGRAM  03/04/2026    COLORECTAL CANCER SCREENING  10/11/2028    HEPATITIS C SCREENING  Completed    COVID-19 Vaccine  Completed    RSV Vaccine - Adults  Completed    INFLUENZA VACCINE  Completed    Pneumococcal Vaccine 65+  Completed    PAP SMEAR  Discontinued    ZOSTER VACCINE  Discontinued                  CMS Preventative Services Quick Reference  Risk Factors Identified During Encounter  Chronic Pain: NSAIDs per medication orders.  Follow up in 6 months.  Fall Risk-High or Moderate: Discussed Fall Prevention in the home  Immunizations Discussed/Encouraged: Shingrix and RSV (Respiratory Syncytial Virus)  The above risks/problems have been discussed with the patient.  Pertinent information has been shared with the patient in the After Visit Summary.  An After Visit Summary and PPPS were made available to the patient.    Follow Up:   Next Medicare Wellness visit to be scheduled in 1 year.       Additional E&M Note during same encounter follows:  Patient has multiple medical problems which are significant and separately identifiable that require additional work above and beyond the Medicare Wellness Visit.      Chief Complaint  Annual Exam    Subjective        Patient is a 69-year-old female.  She is here for subsequent Medicare wellness.  She does have GERD, hypertension, bilateral knee pain.  She is requesting a handicap placard.  She states that is very difficult to walk a long way going to her grandchildren's events.  She states her spouse is very ill and she has a  "lot of stress but does not want to be on anything that she would have to wean back off of.  Discussed propranolol as needed she is agreeable to try.  She denies any thoughts of self-harm or suicide.  She currently declines talking to behavioral health specialist.       Ana Alaniz is also being seen today for   Bilateral knee, hip pain. Requesting a handicap placard   Stress spouse is sick - anxiety   Propranolol prn     Review of Systems   Constitutional: Negative.  Positive for fatigue.   HENT: Negative.     Respiratory: Negative.     Cardiovascular: Negative.    Gastrointestinal: Negative.    Genitourinary: Negative.    Musculoskeletal:  Positive for arthralgias and myalgias. Negative for joint swelling.   Skin: Negative.    Allergic/Immunologic: Negative.    Neurological: Negative.    Hematological: Negative.    Psychiatric/Behavioral:  Positive for sleep disturbance. Negative for self-injury and suicidal ideas. The patient is nervous/anxious.        Objective   Vital Signs:  /80 (BP Location: Left arm, Patient Position: Sitting, Cuff Size: Adult)   Pulse 76   Temp 98.5 °F (36.9 °C) (Temporal)   Ht 162.6 cm (64.02\")   Wt 79.4 kg (175 lb)   SpO2 97%   BMI 30.02 kg/m²     Physical Exam  Vitals reviewed.   HENT:      Head: Normocephalic.      Right Ear: Tympanic membrane, ear canal and external ear normal.      Left Ear: Tympanic membrane, ear canal and external ear normal.      Nose: Nose normal.   Eyes:      Pupils: Pupils are equal, round, and reactive to light.   Cardiovascular:      Rate and Rhythm: Normal rate and regular rhythm.      Pulses: Normal pulses.   Pulmonary:      Effort: Pulmonary effort is normal.      Breath sounds: Normal breath sounds.   Abdominal:      General: Bowel sounds are normal.      Palpations: Abdomen is soft.   Musculoskeletal:         General: Normal range of motion.   Skin:     General: Skin is warm and dry.      Capillary Refill: Capillary refill takes less than 2 " seconds.   Neurological:      Mental Status: She is alert and oriented to person, place, and time.   Psychiatric:         Mood and Affect: Mood normal.         Behavior: Behavior normal.         Thought Content: Thought content normal.         Judgment: Judgment normal.                  SCANNED PATHOLOGY (10/11/2023)  Comprehensive Metabolic Panel (08/22/2022 12:24)  Comprehensive Metabolic Panel (02/28/2023 11:11)  Lipid Panel (02/28/2023 11:11)  Uric acid (08/22/2022 12:24)  SCANNED - COLONOSCOPY (10/11/2023)   Mammo Diagnostic Right With CAD (03/04/2024 15:30)        Assessment and Plan   Diagnoses and all orders for this visit:    1. Medicare annual wellness visit, subsequent (Primary)  -     Comprehensive Metabolic Panel; Future  -     TSH Rfx On Abnormal To Free T4; Future  -     Urinalysis With Culture If Indicated -; Future    2. Hypertension, unspecified type  -     amLODIPine (NORVASC) 5 MG tablet; Take 1 tablet by mouth Daily.  Dispense: 90 tablet; Refill: 1  -     hydroCHLOROthiazide 25 MG tablet; Take 1 tablet by mouth Daily.  Dispense: 90 tablet; Refill: 1  -     losartan (COZAAR) 50 MG tablet; Take 1 tablet by mouth Daily.  Dispense: 90 tablet; Refill: 1  -     Comprehensive Metabolic Panel; Future  Chronic - stable   3. Essential hypertension  -     amLODIPine (NORVASC) 5 MG tablet; Take 1 tablet by mouth Daily.  Dispense: 90 tablet; Refill: 1  -     hydroCHLOROthiazide 25 MG tablet; Take 1 tablet by mouth Daily.  Dispense: 90 tablet; Refill: 1  -     losartan (COZAAR) 50 MG tablet; Take 1 tablet by mouth Daily.  Dispense: 90 tablet; Refill: 1    4. Gastroesophageal reflux disease without esophagitis  -     omeprazole (priLOSEC) 40 MG capsule; Take 1 capsule by mouth Daily.  Dispense: 90 capsule; Refill: 3  Chronic stable   5. Anxiety  -     propranolol (INDERAL) 10 MG tablet; Take 1 tablet by mouth 2 (Two) Times a Day As Needed (anxiety).  Dispense: 40 tablet; Refill: 1  Adding -   6. Osteopenia of  neck of left femur    7. Mixed hyperlipidemia  -     atorvastatin (Lipitor) 40 MG tablet; Take 1 tablet by mouth Daily.  Dispense: 90 tablet; Refill: 1  Chronic - stable     Placard - assisted patient.   Filled out.     See ppps   Follow up  as needed and in 6 months.              Follow Up   Return in about 6 months (around 9/8/2024), or HTN, GERD.  Patient was given instructions and counseling regarding her condition or for health maintenance advice. Please see specific information pulled into the AVS if appropriate.

## 2024-03-22 NOTE — PATIENT INSTRUCTIONS
Medicare Wellness  Personal Prevention Plan of Service     Date of Office Visit:    Encounter Provider:  LYNDA Rodriguez  Place of Service:  [facility]  Patient Name: Ana Alaniz  :  1955    As part of the Medicare Wellness portion of your visit today, we are providing you with this personalized preventive plan of services (PPPS). This plan is based upon recommendations of the [place] Preventive Services Task Force (USPSTF) and the Advisory Committee on Immunization Practices (ACIP).    This lists the preventive care services that should be considered, and provides dates of when you are due. Items listed as completed are up-to-date and do not require any further intervention.    Health Maintenance   Topic Date Due    DXA SCAN  2024    LIPID PANEL  2024    ANNUAL WELLNESS VISIT  2025    BMI FOLLOWUP  2025    MAMMOGRAM  2026    COLORECTAL CANCER SCREENING  10/11/2028    HEPATITIS C SCREENING  Completed    COVID-19 Vaccine  Completed    RSV Vaccine - Adults  Completed    INFLUENZA VACCINE  Completed    Pneumococcal Vaccine 65+  Completed    PAP SMEAR  Discontinued    ZOSTER VACCINE  Discontinued       Orders Placed This Encounter   Procedures    Comprehensive Metabolic Panel     Standing Status:   Future     Number of Occurrences:   1     Standing Expiration Date:   3/8/2025     Order Specific Question:   Release to patient     Answer:   Routine Release [[id number]]    TSH Rfx On Abnormal To Free T4     Standing Status:   Future     Number of Occurrences:   1     Order Specific Question:   Release to patient     Answer:   Routine Release [[id number]]    Urinalysis With Culture If Indicated -     Standing Status:   Future     Number of Occurrences:   1     Standing Expiration Date:   3/8/2025     Order Specific Question:   Release to patient     Answer:   Routine Release [[id number]]       Return in about 6 months (around 2024), or  HTN, GERD.

## 2024-07-08 ENCOUNTER — OFFICE VISIT (OUTPATIENT)
Dept: SLEEP MEDICINE | Facility: CLINIC | Age: 69
End: 2024-07-08
Payer: MEDICARE

## 2024-07-08 VITALS
DIASTOLIC BLOOD PRESSURE: 80 MMHG | SYSTOLIC BLOOD PRESSURE: 138 MMHG | BODY MASS INDEX: 29.88 KG/M2 | WEIGHT: 175 LBS | HEART RATE: 73 BPM | OXYGEN SATURATION: 96 % | HEIGHT: 64 IN | TEMPERATURE: 98.2 F

## 2024-07-08 DIAGNOSIS — G47.33 OSA (OBSTRUCTIVE SLEEP APNEA): Primary | ICD-10-CM

## 2024-07-08 PROCEDURE — 3079F DIAST BP 80-89 MM HG: CPT | Performed by: NURSE PRACTITIONER

## 2024-07-08 PROCEDURE — 3075F SYST BP GE 130 - 139MM HG: CPT | Performed by: NURSE PRACTITIONER

## 2024-07-08 PROCEDURE — 1160F RVW MEDS BY RX/DR IN RCRD: CPT | Performed by: NURSE PRACTITIONER

## 2024-07-08 PROCEDURE — 1159F MED LIST DOCD IN RCRD: CPT | Performed by: NURSE PRACTITIONER

## 2024-07-08 PROCEDURE — 99213 OFFICE O/P EST LOW 20 MIN: CPT | Performed by: NURSE PRACTITIONER

## 2024-07-08 NOTE — PROGRESS NOTES
Chief Complaint:   Chief Complaint   Patient presents with    Follow-up    Sleep Apnea       HPI:    Ana Alaniz is a 69 y.o. female here for follow-up of sleep apnea.  Patient was last seen 7/10/2023.  Patient continues to do well with CPAP therapy.  Patient is sleeping 7 to 9 hours nightly and feels rested upon awakening.  Patient goes to sleep within 20 to 30 minutes will occasionally get up x 1.  Patient has an Compton score of 0/24.  Patient does well with fullface mask in standard tubing.  Patient has no concerns or complaints and will continue therapy.        Current medications are:   Current Outpatient Medications:     amLODIPine (NORVASC) 5 MG tablet, Take 1 tablet by mouth Daily., Disp: 90 tablet, Rfl: 1    atorvastatin (Lipitor) 40 MG tablet, Take 1 tablet by mouth Daily., Disp: 90 tablet, Rfl: 1    Calcium Carb-Cholecalciferol 600-500 MG-UNIT capsule, Take  by mouth., Disp: , Rfl:     fexofenadine (ALLEGRA) 180 MG tablet, Take 1 tablet by mouth Daily., Disp: 90 tablet, Rfl: 3    Flaxseed, Linseed, (FLAX SEED OIL) 1300 MG capsule, Take 1 tablet by mouth Daily., Disp: , Rfl:     Glucosamine-Chondroit-Vit C-Mn (GLUCOSAMINE CHONDR 1500 COMPLX) capsule, Take 1,500 mg by mouth 2 (Two) Times a Day., Disp: , Rfl:     hydroCHLOROthiazide 25 MG tablet, Take 1 tablet by mouth Daily., Disp: 90 tablet, Rfl: 1    losartan (COZAAR) 50 MG tablet, Take 1 tablet by mouth Daily., Disp: 90 tablet, Rfl: 1    Lutein-Zeaxanthin 25-5 MG capsule, Take 1 tablet by mouth Daily., Disp: , Rfl:     Multiple Vitamins-Minerals (PRESERVISION AREDS 2 PO), Take  by mouth Daily., Disp: , Rfl:     Multiple Vitamins-Minerals (PreserVision AREDS 2+Multi Vit) capsule, , Disp: , Rfl:     Omega-3 Fatty Acids (OMEGA-3 FISH OIL) 500 MG capsule, Take 1 capsule by mouth Daily., Disp: , Rfl:     omeprazole (priLOSEC) 40 MG capsule, Take 1 capsule by mouth Daily., Disp: 90 capsule, Rfl: 3.      The patient's relevant past medical, surgical, family  and social history were reviewed and updated in Epic as appropriate.       Review of Systems   Eyes:  Positive for visual disturbance.   Respiratory:  Positive for apnea.    Cardiovascular:  Positive for palpitations.   Gastrointestinal:         Heartburn   Musculoskeletal:  Positive for arthralgias.   Psychiatric/Behavioral:  Positive for sleep disturbance.    All other systems reviewed and are negative.        Objective:    Physical Exam  Constitutional:       Appearance: Normal appearance.   HENT:      Head: Normocephalic and atraumatic.      Mouth/Throat:      Comments: Mallampati 1 anatomy  Cardiovascular:      Rate and Rhythm: Normal rate and regular rhythm.   Pulmonary:      Effort: Pulmonary effort is normal.      Breath sounds: Normal breath sounds.   Skin:     General: Skin is warm and dry.   Neurological:      Mental Status: She is alert and oriented to person, place, and time.   Psychiatric:         Mood and Affect: Mood normal.         Behavior: Behavior normal.         Thought Content: Thought content normal.         Judgment: Judgment normal.         CPAP Report  85/90 days of use  Greater than 4-hour use 86.7  90% pressure 9.8  AHI of 2.1  Setting 8-18    The patient continues to use and benefit from CPAP therapy.    ASSESSMENT/PLAN    Diagnoses and all orders for this visit:    1. LUKASZ (obstructive sleep apnea) (Primary)  -     PAP Therapy        Counseled patient regarding multimodal approach with healthy nutrition, healthy sleep, regular physical activity, social activities, counseling, and medications. Encouraged to practice lateral sleep position. Avoid alcohol and sedatives close to bedtime.  Refill supplies x 1 year.  Return to clinic 1 year or sooner as symptoms warrant.      Signed by  LYNDA Kitchen    July 8, 2024      CC: Provider, No Known         No ref. provider found

## 2024-09-26 DIAGNOSIS — E78.2 MIXED HYPERLIPIDEMIA: ICD-10-CM

## 2024-09-26 RX ORDER — ATORVASTATIN CALCIUM 40 MG/1
40 TABLET, FILM COATED ORAL DAILY
Qty: 90 TABLET | Refills: 0 | Status: SHIPPED | OUTPATIENT
Start: 2024-09-26

## 2024-11-04 DIAGNOSIS — I10 HYPERTENSION, UNSPECIFIED TYPE: ICD-10-CM

## 2024-11-04 DIAGNOSIS — I10 ESSENTIAL HYPERTENSION: ICD-10-CM

## 2024-11-04 RX ORDER — LOSARTAN POTASSIUM 50 MG/1
50 TABLET ORAL DAILY
Qty: 90 TABLET | Refills: 1 | Status: CANCELLED | OUTPATIENT
Start: 2024-11-04

## 2024-11-04 RX ORDER — AMLODIPINE BESYLATE 5 MG/1
5 TABLET ORAL DAILY
Qty: 90 TABLET | Refills: 1 | Status: CANCELLED | OUTPATIENT
Start: 2024-11-04

## 2024-11-04 RX ORDER — HYDROCHLOROTHIAZIDE 25 MG/1
25 TABLET ORAL DAILY
Qty: 90 TABLET | Refills: 1 | Status: CANCELLED | OUTPATIENT
Start: 2024-11-04

## 2024-11-04 NOTE — TELEPHONE ENCOUNTER
Caller: Corbin Ana SANDRA    Relationship: Self    Best call back number: [phone]     Requested Prescriptions:   Requested Prescriptions     Pending Prescriptions Disp Refills    amLODIPine (NORVASC) 5 MG tablet 90 tablet 1     Sig: Take 1 tablet by mouth Daily.    hydroCHLOROthiazide 25 MG tablet 90 tablet 1     Sig: Take 1 tablet by mouth Daily.    losartan (COZAAR) 50 MG tablet 90 tablet 1     Sig: Take 1 tablet by mouth Daily.        Pharmacy where request should be sent: [facility] [id number]  [phone] [facility] [phone] FX     Last office visit with prescribing clinician: Visit date not found   Last telemedicine visit with prescribing clinician: Visit date not found   Next office visit with prescribing clinician: 11/14/2024       Does the patient have less than a 3 day supply:  [x] Yes  [] No    Would you like a call back once the refill request has been completed: [] Yes [x] No    If the office needs to give you a call back, can they leave a voicemail: [] Yes [x] No    Yuki Hunt Rep   11/04/24 13:50 EST

## 2024-11-05 ENCOUNTER — HOSPITAL ENCOUNTER (EMERGENCY)
Facility: HOSPITAL | Age: 69
Discharge: HOME | End: 2024-11-05
Attending: STUDENT IN AN ORGANIZED HEALTH CARE EDUCATION/TRAINING PROGRAM | Admitting: STUDENT IN AN ORGANIZED HEALTH CARE EDUCATION/TRAINING PROGRAM
Payer: MEDICARE

## 2024-11-05 ENCOUNTER — APPOINTMENT (OUTPATIENT)
Facility: HOSPITAL | Age: 69
End: 2024-11-05
Payer: MEDICARE

## 2024-11-05 VITALS
HEIGHT: 64 IN | WEIGHT: 175 LBS | DIASTOLIC BLOOD PRESSURE: 82 MMHG | RESPIRATION RATE: 18 BRPM | OXYGEN SATURATION: 100 % | BODY MASS INDEX: 29.88 KG/M2 | TEMPERATURE: 98.2 F | HEART RATE: 60 BPM | SYSTOLIC BLOOD PRESSURE: 145 MMHG

## 2024-11-05 DIAGNOSIS — M16.10 ARTHRITIS, HIP: Primary | ICD-10-CM

## 2024-11-05 LAB
ALBUMIN SERPL-MCNC: 4.6 G/DL (ref 3.5–5.2)
ALBUMIN/GLOB SERPL: 1.8 G/DL
ALP SERPL-CCNC: 66 U/L (ref 39–117)
ALT SERPL W P-5'-P-CCNC: 14 U/L (ref 1–33)
ANION GAP SERPL CALCULATED.3IONS-SCNC: 15.7 MMOL/L (ref 5–15)
AST SERPL-CCNC: 30 U/L (ref 1–32)
BASOPHILS # BLD AUTO: 0.04 10*3/MM3 (ref 0–0.2)
BASOPHILS NFR BLD AUTO: 0.6 % (ref 0–1.5)
BILIRUB SERPL-MCNC: 0.6 MG/DL (ref 0–1.2)
BUN SERPL-MCNC: 11 MG/DL (ref 8–23)
BUN/CREAT SERPL: 19.3 (ref 7–25)
CALCIUM SPEC-SCNC: 9.9 MG/DL (ref 8.6–10.5)
CHLORIDE SERPL-SCNC: 103 MMOL/L (ref 98–107)
CO2 SERPL-SCNC: 23.3 MMOL/L (ref 22–29)
CREAT SERPL-MCNC: 0.57 MG/DL (ref 0.57–1)
DEPRECATED RDW RBC AUTO: 48.7 FL (ref 37–54)
EGFRCR SERPLBLD CKD-EPI 2021: 97.3 ML/MIN/1.73
EOSINOPHIL # BLD AUTO: 0.04 10*3/MM3 (ref 0–0.4)
EOSINOPHIL NFR BLD AUTO: 0.6 % (ref 0.3–6.2)
ERYTHROCYTE [DISTWIDTH] IN BLOOD BY AUTOMATED COUNT: 13.4 % (ref 12.3–15.4)
GLOBULIN UR ELPH-MCNC: 2.6 GM/DL
GLUCOSE SERPL-MCNC: 122 MG/DL (ref 65–99)
HCT VFR BLD AUTO: 38.6 % (ref 34–46.6)
HGB BLD-MCNC: 12.7 G/DL (ref 12–15.9)
IMM GRANULOCYTES # BLD AUTO: 0.01 10*3/MM3 (ref 0–0.05)
IMM GRANULOCYTES NFR BLD AUTO: 0.1 % (ref 0–0.5)
LYMPHOCYTES # BLD AUTO: 1.4 10*3/MM3 (ref 0.7–3.1)
LYMPHOCYTES NFR BLD AUTO: 20.4 % (ref 19.6–45.3)
MCH RBC QN AUTO: 32.1 PG (ref 26.6–33)
MCHC RBC AUTO-ENTMCNC: 32.9 G/DL (ref 31.5–35.7)
MCV RBC AUTO: 97.5 FL (ref 79–97)
MONOCYTES # BLD AUTO: 0.51 10*3/MM3 (ref 0.1–0.9)
MONOCYTES NFR BLD AUTO: 7.4 % (ref 5–12)
NEUTROPHILS NFR BLD AUTO: 4.86 10*3/MM3 (ref 1.7–7)
NEUTROPHILS NFR BLD AUTO: 70.9 % (ref 42.7–76)
PLATELET # BLD AUTO: 200 10*3/MM3 (ref 140–450)
PMV BLD AUTO: 10.6 FL (ref 6–12)
POTASSIUM SERPL-SCNC: 3.3 MMOL/L (ref 3.5–5.2)
PROT SERPL-MCNC: 7.2 G/DL (ref 6–8.5)
RBC # BLD AUTO: 3.96 10*6/MM3 (ref 3.77–5.28)
SODIUM SERPL-SCNC: 142 MMOL/L (ref 136–145)
WBC NRBC COR # BLD AUTO: 6.86 10*3/MM3 (ref 3.4–10.8)

## 2024-11-05 PROCEDURE — 96374 THER/PROPH/DIAG INJ IV PUSH: CPT

## 2024-11-05 PROCEDURE — 72192 CT PELVIS W/O DYE: CPT

## 2024-11-05 PROCEDURE — 99284 EMERGENCY DEPT VISIT MOD MDM: CPT

## 2024-11-05 PROCEDURE — 80053 COMPREHEN METABOLIC PANEL: CPT | Performed by: PHYSICIAN ASSISTANT

## 2024-11-05 PROCEDURE — 96375 TX/PRO/DX INJ NEW DRUG ADDON: CPT

## 2024-11-05 PROCEDURE — 85025 COMPLETE CBC W/AUTO DIFF WBC: CPT | Performed by: PHYSICIAN ASSISTANT

## 2024-11-05 PROCEDURE — 25010000002 DEXAMETHASONE PER 1 MG: Performed by: PHYSICIAN ASSISTANT

## 2024-11-05 PROCEDURE — 72131 CT LUMBAR SPINE W/O DYE: CPT

## 2024-11-05 PROCEDURE — 25010000002 MORPHINE PER 10 MG: Performed by: STUDENT IN AN ORGANIZED HEALTH CARE EDUCATION/TRAINING PROGRAM

## 2024-11-05 RX ORDER — BACLOFEN 10 MG/1
10 TABLET ORAL EVERY 8 HOURS PRN
Qty: 10 TABLET | Refills: 0 | Status: SHIPPED | OUTPATIENT
Start: 2024-11-05 | End: 2024-11-08 | Stop reason: SDUPTHER

## 2024-11-05 RX ORDER — DEXAMETHASONE SODIUM PHOSPHATE 10 MG/ML
10 INJECTION INTRAMUSCULAR; INTRAVENOUS ONCE
Status: COMPLETED | OUTPATIENT
Start: 2024-11-05 | End: 2024-11-05

## 2024-11-05 RX ORDER — NAPROXEN 500 MG/1
500 TABLET ORAL EVERY 12 HOURS PRN
Qty: 10 TABLET | Refills: 0 | Status: SHIPPED | OUTPATIENT
Start: 2024-11-05

## 2024-11-05 RX ORDER — BACLOFEN 10 MG/1
10 TABLET ORAL ONCE
Status: COMPLETED | OUTPATIENT
Start: 2024-11-05 | End: 2024-11-05

## 2024-11-05 RX ORDER — LIDOCAINE 50 MG/G
1 PATCH TOPICAL EVERY 24 HOURS
Qty: 10 PATCH | Refills: 0 | Status: SHIPPED | OUTPATIENT
Start: 2024-11-05

## 2024-11-05 RX ORDER — SODIUM CHLORIDE 0.9 % (FLUSH) 0.9 %
10 SYRINGE (ML) INJECTION AS NEEDED
Status: DISCONTINUED | OUTPATIENT
Start: 2024-11-05 | End: 2024-11-05 | Stop reason: HOSPADM

## 2024-11-05 RX ADMIN — MORPHINE SULFATE 4 MG: 4 INJECTION, SOLUTION INTRAMUSCULAR; INTRAVENOUS at 12:42

## 2024-11-05 RX ADMIN — BACLOFEN 10 MG: 10 TABLET ORAL at 12:42

## 2024-11-05 RX ADMIN — DEXAMETHASONE SODIUM PHOSPHATE 10 MG: 10 INJECTION INTRAMUSCULAR; INTRAVENOUS at 12:42

## 2024-11-05 NOTE — DISCHARGE INSTRUCTIONS
Take medications as prescribed for hip pain.  Refrain from ibuprofen, Aleve or other NSAIDs while taking the naproxen.  You should follow-up closely with your PCP as scheduled next week.  Please return to nearest ED immediately for any worsening symptoms or concerns.

## 2024-11-05 NOTE — FS[facility] PROVIDER NOTE
Subjective  History of Present Illness:    69-year-old female PMH HTN, degenerative joint disease, osteopenia presents to ED for evaluation of left hip pain.  Patient states that she awoke on  with pain to the left posterior hip, no known injury.  States that secondary to the pain she was unable to get out of bed.  She has been ambulatory since but with increased pain.  She denies any numbness, tingling of the extremity.  No fevers, chills, loss of bowel or bladder.  She is denying any low back pain or history of sciatica.  She took over-the-counter pain medication without much improvement followed by an old prescription of oxycodone that had reportedly .  States that the oxycodone did slightly lessen the pain.       Nurses Notes reviewed and agree, including vitals, allergies, social history and prior medical history.     REVIEW OF SYSTEMS: All systems reviewed and not pertinent unless noted.  Review of Systems   Constitutional:  Negative for chills and fever.   Musculoskeletal:  Negative for back pain.        Left hip pain   Neurological:  Negative for weakness and numbness.        Negative for loss of bowel or bladder, saddle paresthesias or anesthesias   All other systems reviewed and are negative.      Past Medical History:   Diagnosis Date    Acute pharyngitis     Allergic 50 years    Various    Allergy     Arrhythmia     Degenerative arthritis     Degenerative joint disease 2016    Epistaxis     Glaucoma     Hypertension     Osteopenia of neck of left femur 2022 DEXA scan: start calcium 600 mg daily and 800 IU vitamin D3.  Increase weight bearing exercises.  Follow up in 2-3 years for recheck.     Positive KENY (antinuclear antibody)     Right foot pain     Sinusitis     Sleep apnea     Transfusion history     URI (upper respiratory infection)        Allergies:    Tetanus toxoids and Ace inhibitors      Past Surgical History:   Procedure Laterality Date    APPENDECTOMY       "CATARACT EXTRACTION, BILATERAL      JOINT REPLACEMENT  2013, 2010    Knees    OTHER SURGICAL HISTORY      LT Knee scar excision    REPLACEMENT TOTAL KNEE Right     TOTAL KNEE ARTHROPLASTY Left          Social History     Socioeconomic History    Marital status:    Tobacco Use    Smoking status: Never    Smokeless tobacco: Never   Vaping Use    Vaping status: Never Used   Substance and Sexual Activity    Alcohol use: Yes     Alcohol/week: 1.0 - 2.0 standard drink of alcohol     Types: 1 - 2 Glasses of wine per week     Comment: nightly    Drug use: No    Sexual activity: Defer         Family History   Problem Relation Age of Onset    Heart disease Mother     Hypertension Mother     Kidney disease Mother     Clotting disorder Mother     Heart attack Mother     Hypertension Father     Stroke Father     Coronary artery disease Father     Clotting disorder Father     Hypertension Brother     Heart disease Brother         Congestive hear failure    Stroke Brother     Heart disease Brother     No Known Problems Brother     No Known Problems Brother     Cancer Maternal Grandmother         Grandmother    No Known Problems Paternal Grandmother     No Known Problems Maternal Grandfather     No Known Problems Paternal Grandfather     Cancer Other     Breast cancer Neg Hx     Ovarian cancer Neg Hx        Objective  Physical Exam:  /94 (BP Location: Left arm, Patient Position: Sitting)   Pulse 76   Temp 98.2 °F (36.8 °C) (Oral)   Resp 18   Ht 162.6 cm (64\")   Wt 79.4 kg (175 lb)   SpO2 98%   BMI 30.04 kg/m²      Physical Exam  Vitals and nursing note reviewed.   Constitutional:       General: She is not in acute distress.  HENT:      Head: Normocephalic and atraumatic.   Cardiovascular:      Rate and Rhythm: Normal rate and regular rhythm.   Pulmonary:      Effort: Pulmonary effort is normal. No respiratory distress.      Breath sounds: Normal breath sounds.   Musculoskeletal:      Comments: No vertebral " tenderness to spine appreciated.  Does have some tenderness to left posterior hip and left low back.  Pain is reproduced with range of motion.  Pain with left lower extremity leg raise.  Extremity is warm and well-perfused.  No sensory deficit.   Skin:     General: Skin is warm and dry.   Neurological:      Mental Status: She is alert.      Sensory: No sensory deficit.      Motor: No weakness.      Comments: Awake and alert   Psychiatric:         Mood and Affect: Mood normal.         Behavior: Behavior normal.         Procedures    ED Course:         Lab Results (last 24 hours)       Procedure Component Value Units Date/Time    Comprehensive Metabolic Panel [[id number]]  (Abnormal) Collected: 11/05/24 1241    Specimen: Blood Updated: 11/05/24 1314     Glucose 122 mg/dL      BUN 11 mg/dL      Creatinine 0.57 mg/dL      Sodium 142 mmol/L      Potassium 3.3 mmol/L      Chloride 103 mmol/L      CO2 23.3 mmol/L      Calcium 9.9 mg/dL      Total Protein 7.2 g/dL      Albumin 4.6 g/dL      ALT (SGPT) 14 U/L      AST (SGOT) 30 U/L      Alkaline Phosphatase 66 U/L      Total Bilirubin 0.6 mg/dL      Globulin 2.6 gm/dL      A/G Ratio 1.8 g/dL      BUN/Creatinine Ratio 19.3     Anion Gap 15.7 mmol/L      eGFR 97.3 mL/min/1.73     Narrative:      GFR Normal >60  Chronic Kidney Disease <60  Kidney Failure <15    The GFR formula is only valid for adults with stable renal function between ages 18 and 70.    CBC & Differential [[id number]]  (Abnormal) Collected: 11/05/24 1241    Specimen: Blood Updated: 11/05/24 1249    Narrative:      The following orders were created for panel order CBC & Differential.  Procedure                               Abnormality         Status                     ---------                               -----------         ------                     CBC Auto Differential[[id number]]        Abnormal            Final result                 Please view results for these tests on the individual orders.    CBC  Auto Differential [[id number]]  (Abnormal) Collected: 11/05/24 1241    Specimen: Blood Updated: 11/05/24 1249     WBC 6.86 10*3/mm3      RBC 3.96 10*6/mm3      Hemoglobin 12.7 g/dL      Hematocrit 38.6 %      MCV 97.5 fL      MCH 32.1 pg      MCHC 32.9 g/dL      RDW 13.4 %      RDW-SD 48.7 fl      MPV 10.6 fL      Platelets 200 10*3/mm3      Neutrophil % 70.9 %      Lymphocyte % 20.4 %      Monocyte % 7.4 %      Eosinophil % 0.6 %      Basophil % 0.6 %      Immature Grans % 0.1 %      Neutrophils, Absolute 4.86 10*3/mm3      Lymphocytes, Absolute 1.40 10*3/mm3      Monocytes, Absolute 0.51 10*3/mm3      Eosinophils, Absolute 0.04 10*3/mm3      Basophils, Absolute 0.04 10*3/mm3      Immature Grans, Absolute 0.01 10*3/mm3              CT Lumbar Spine Without Contrast    Result Date: 11/5/2024  CT PELVIS WO CONTRAST, CT LUMBAR SPINE WO CONTRAST Date of Exam: 11/5/2024 12:49 PM EST Indication: L hip pain. Comparison: None available. Technique: Axial CT images were obtained of the pelvis and lumbar spine without contrast administration.  Reconstructed coronal and sagittal images were also obtained. Automated exposure control and iterative construction methods were used. Findings: CT pelvis: No evidence of acute fracture or avascular necrosis. Normal pelvic bone alignment. Moderate bilateral SI joint and pubic symphysis degenerative change. Mild bilateral hip arthritis. Trochanteric enthesopathy. No suspicious bone lesion. The included intrapelvic structures show no acute abnormality. There is colonic diverticulosis without diverticulitis. No suspicious soft tissue lesion. No drainable or well-defined fluid collection. CT lumbar spine: There is no evidence of acute fracture. Normal lumbar spine alignment. Small scattered endplate osteophyte formations with lower lumbar facet arthropathy. Mild degenerative disc height loss at L5-S1. No evidence of severe canal or severe bony neural foraminal narrowing. Paraspinal soft  tissues show bilateral nonobstructing nephrolithiasis. There are multiple left-sided parapelvic cysts. There are a few atherosclerotic calcifications of the aorta.     Impression: Impression: No acute abnormality of the pelvis or lumbar spine. Chronic findings as above. Electronically Signed: Kee Darden MD  11/5/2024 1:00 PM EST  Workstation ID: [id number]    CT Pelvis Without Contrast    Result Date: 11/5/2024  CT PELVIS WO CONTRAST, CT LUMBAR SPINE WO CONTRAST Date of Exam: 11/5/2024 12:49 PM EST Indication: L hip pain. Comparison: None available. Technique: Axial CT images were obtained of the pelvis and lumbar spine without contrast administration.  Reconstructed coronal and sagittal images were also obtained. Automated exposure control and iterative construction methods were used. Findings: CT pelvis: No evidence of acute fracture or avascular necrosis. Normal pelvic bone alignment. Moderate bilateral SI joint and pubic symphysis degenerative change. Mild bilateral hip arthritis. Trochanteric enthesopathy. No suspicious bone lesion. The included intrapelvic structures show no acute abnormality. There is colonic diverticulosis without diverticulitis. No suspicious soft tissue lesion. No drainable or well-defined fluid collection. CT lumbar spine: There is no evidence of acute fracture. Normal lumbar spine alignment. Small scattered endplate osteophyte formations with lower lumbar facet arthropathy. Mild degenerative disc height loss at L5-S1. No evidence of severe canal or severe bony neural foraminal narrowing. Paraspinal soft tissues show bilateral nonobstructing nephrolithiasis. There are multiple left-sided parapelvic cysts. There are a few atherosclerotic calcifications of the aorta.     Impression: Impression: No acute abnormality of the pelvis or lumbar spine. Chronic findings as above. Electronically Signed: Kee Darden MD  11/5/2024 1:00 PM EST  Workstation ID: [id number]        [facility]      Initial  impression of presenting illness: 69-year-old female presents to ED for evaluation of left hip pain.  Refer to [facility] for further details    DDX: includes but is not limited to: Fracture, dislocation, sciatica, degenerative joint disease, septic joint    Patient arrives afebrile with stable vitals interpreted by myself.     Pertinent features from physical exam: Pain to left posterior hip and left low back without any overlying skin changes.  Pain with left lower extremity leg raise.  No sensory deficit..    Initial diagnostic plan: CT lumbar spine, CT pelvis, CBC, CMP    Results from initial plan were reviewed and interpreted by me revealing lab work unremarkable.  CT imaging is consistent with moderate bilateral SI joint and pubic symphysis degenerative change. Mild bilateral hip arthritis.  Interventions: Medications administered as below    Medications   sodium chloride 0.9 % flush 10 mL (has no administration in time range)   baclofen (LIORESAL) tablet 10 mg (10 mg Oral Given 11/5/24 1242)   dexAMETHasone (DECADRON) injection 10 mg (10 mg Intravenous Given 11/5/24 1242)   morphine injection 4 mg (4 mg Intravenous Given 11/5/24 1242)       Reevaluation: Upon reevaluation patient reports improvement in symptoms following medications administered as above.  Discussed all findings as above with patient.  Will discharge home with naproxen, baclofen, lidocaine patches.  She has been instructed to follow-up closely with her PCP and to return to nearest ED immediately for any worsening symptoms or concerns.        -----  ED Disposition       ED Disposition   Discharge    Condition   Stable    Comment   --             Final diagnoses:   Arthritis, hip      Your Follow-Up Providers       Go to  [facility] EMERGENCY DEPARTMENT [place].    Specialty: Emergency Medicine  Follow up details: If symptoms worsen  [address] [facility] [address]  [facility] [address] [phone]                     Contact  information for after-discharge care    Follow-up information has not been specified.                    Your medication list        START taking these medications        Instructions Last Dose Given Next Dose Due   baclofen 10 MG tablet  Commonly known as: LIORESAL      Take 1 tablet by mouth Every 8 (Eight) Hours As Needed for Muscle Spasms.       lidocaine 5 %  Commonly known as: LIDODERM      Place 1 patch on the skin as directed by provider Daily. Remove & Discard patch within 12 hours or as directed by MD       naproxen 500 MG EC tablet  Commonly known as: EC NAPROSYN      Take 1 tablet by mouth Every 12 (Twelve) Hours As Needed for Mild Pain.              CONTINUE taking these medications        Instructions Last Dose Given Next Dose Due   amLODIPine 5 MG tablet  Commonly known as: NORVASC      Take 1 tablet by mouth Daily for 30 days.       atorvastatin 40 MG tablet  Commonly known as: Lipitor      Take 1 tablet by mouth Daily.       Calcium Carb-Cholecalciferol 600-500 MG-UNIT capsule      Take  by mouth.       fexofenadine 180 MG tablet  Commonly known as: ALLEGRA      Take 1 tablet by mouth Daily.       Flax Seed Oil 1300 MG capsule      Take 1 tablet by mouth Daily.       Glucosamine Chondr 1500 Complx capsule      Take 1,500 mg by mouth 2 (Two) Times a Day.       hydroCHLOROthiazide 25 MG tablet      Take 1 tablet by mouth Daily.       losartan 50 MG tablet  Commonly known as: COZAAR      Take 1 tablet by mouth Daily.       Lutein-Zeaxanthin 25-5 MG capsule      Take 1 tablet by mouth Daily.       Omega-3 Fish Oil 500 MG capsule      Take 1 capsule by mouth Daily.       omeprazole 40 MG capsule  Commonly known as: priLOSEC      Take 1 capsule by mouth Daily.       PRESERVISION AREDS 2 PO      Take  by mouth Daily.       PreserVision AREDS 2+Multi Vit capsule                     Where to Get Your Medications        These medications were sent to [facility] [id number] - [address]  [address] - [phone]  - [phone] FX  [address], [facility] [address]      Phone: [phone]   baclofen 10 MG tablet  lidocaine 5 %  naproxen 500 MG EC tablet          Hemigard Intro: Due to skin fragility and wound tension, it was decided to use HEMIGARD adhesive retention suture devices to permit a linear closure. The skin was cleaned and dried for a 6cm distance away from the wound. Excessive hair, if present, was removed to allow for adhesion.

## 2024-11-06 ENCOUNTER — OFFICE VISIT (OUTPATIENT)
Dept: FAMILY MEDICINE CLINIC | Facility: CLINIC | Age: 69
End: 2024-11-06
Payer: MEDICARE

## 2024-11-06 VITALS
SYSTOLIC BLOOD PRESSURE: 140 MMHG | HEART RATE: 65 BPM | BODY MASS INDEX: 30.52 KG/M2 | HEIGHT: 64 IN | OXYGEN SATURATION: 99 % | RESPIRATION RATE: 20 BRPM | DIASTOLIC BLOOD PRESSURE: 88 MMHG | TEMPERATURE: 97.1 F | WEIGHT: 178.8 LBS

## 2024-11-06 DIAGNOSIS — Z23 IMMUNIZATION DUE: ICD-10-CM

## 2024-11-06 DIAGNOSIS — I10 ESSENTIAL HYPERTENSION: ICD-10-CM

## 2024-11-06 DIAGNOSIS — E78.2 MIXED HYPERLIPIDEMIA: ICD-10-CM

## 2024-11-06 DIAGNOSIS — I10 HYPERTENSION, UNSPECIFIED TYPE: ICD-10-CM

## 2024-11-06 DIAGNOSIS — M25.552 LEFT HIP PAIN: Primary | ICD-10-CM

## 2024-11-06 DIAGNOSIS — K21.9 GASTROESOPHAGEAL REFLUX DISEASE WITHOUT ESOPHAGITIS: ICD-10-CM

## 2024-11-06 PROCEDURE — 99214 OFFICE O/P EST MOD 30 MIN: CPT | Performed by: HOSPITALIST

## 2024-11-06 PROCEDURE — 1159F MED LIST DOCD IN RCRD: CPT | Performed by: HOSPITALIST

## 2024-11-06 PROCEDURE — 3079F DIAST BP 80-89 MM HG: CPT | Performed by: HOSPITALIST

## 2024-11-06 PROCEDURE — 1125F AMNT PAIN NOTED PAIN PRSNT: CPT | Performed by: HOSPITALIST

## 2024-11-06 PROCEDURE — 90662 IIV NO PRSV INCREASED AG IM: CPT | Performed by: HOSPITALIST

## 2024-11-06 PROCEDURE — G0008 ADMIN INFLUENZA VIRUS VAC: HCPCS | Performed by: HOSPITALIST

## 2024-11-06 PROCEDURE — 1160F RVW MEDS BY RX/DR IN RCRD: CPT | Performed by: HOSPITALIST

## 2024-11-06 PROCEDURE — 3077F SYST BP >= 140 MM HG: CPT | Performed by: HOSPITALIST

## 2024-11-06 RX ORDER — LOSARTAN POTASSIUM 50 MG/1
50 TABLET ORAL DAILY
Qty: 30 TABLET | Refills: 0 | Status: SHIPPED | OUTPATIENT
Start: 2024-11-06

## 2024-11-06 RX ORDER — ATORVASTATIN CALCIUM 40 MG/1
40 TABLET, FILM COATED ORAL DAILY
Qty: 90 TABLET | Refills: 0 | Status: SHIPPED | OUTPATIENT
Start: 2024-11-06

## 2024-11-06 RX ORDER — OMEPRAZOLE 40 MG/1
40 CAPSULE, DELAYED RELEASE ORAL DAILY
Qty: 90 CAPSULE | Refills: 3 | Status: SHIPPED | OUTPATIENT
Start: 2024-11-06

## 2024-11-06 RX ORDER — HYDROCHLOROTHIAZIDE 25 MG/1
25 TABLET ORAL DAILY
Qty: 30 TABLET | Refills: 0 | Status: SHIPPED | OUTPATIENT
Start: 2024-11-06

## 2024-11-06 RX ORDER — OXYCODONE HYDROCHLORIDE 5 MG/1
5 TABLET ORAL EVERY 6 HOURS PRN
Qty: 20 TABLET | Refills: 0 | Status: SHIPPED | OUTPATIENT
Start: 2024-11-06 | End: 2024-11-08 | Stop reason: SDUPTHER

## 2024-11-06 RX ORDER — PREDNISONE 10 MG/1
TABLET ORAL
Qty: 30 TABLET | Refills: 0 | Status: SHIPPED | OUTPATIENT
Start: 2024-11-06 | End: 2024-11-18

## 2024-11-06 RX ORDER — AMLODIPINE BESYLATE 5 MG/1
5 TABLET ORAL DAILY
Qty: 30 TABLET | Refills: 0 | Status: SHIPPED | OUTPATIENT
Start: 2024-11-06 | End: 2024-12-06

## 2024-11-06 NOTE — PROGRESS NOTES
New Patient Office Visit      Patient Name: Ana Alaniz  : 1955   MRN: [id number]     Chief Complaint:  Establish Care and Back Pain (CT scan done at ER. Wake up on  in pain. Medication f=given at Er but is not helping.)     History of Present Illness: Ana Alaniz is a 69 y.o. female who is here today for  urgent care follow up for left hip pain.  The patient presented to the emergency department on 2020 for after waking up on , 11/3/2024, with left hip pain. The patient is tearful during the encounter due to pain. She had no injury that she knows of and states that the pain is in her posterior left hip.  She states that she was unable to get out of bed due to the pain on .  She denies numbness, tingling and loss of bowels and bladder.  The patient states that the pain is mostly in the left hip with significant pain to the left low back.  She tried over-the-counter medications along with some  oxycodone that she had at home and stated that the oxycodone only helped a little bit.  She presented to the emergency department where they performed a CT of her pelvis and lumbar spine without contrast that was found to be negative.  The patient was given baclofen, lidocaine 5% patch and naproxen and told to follow-up with her primary care. The patient states the medications given by the ED haven't helped much and she is still having significant pain.        Subjective     Subjective          The following portions of the patient's history were reviewed and updated as appropriate: allergies, current medications, past family history, past medical history, past social history, past surgical history and problem list.    Allergy:   Allergies   Allergen Reactions    Tetanus Toxoids Other (See Comments)     Tested positive on skin testing    Ace Inhibitors Cough        Current Medications:   Current Outpatient Medications   Medication Sig Dispense Refill    amLODIPine (NORVASC) 5 MG  tablet Take 1 tablet by mouth Daily for 30 days. 30 tablet 0    atorvastatin (Lipitor) 40 MG tablet Take 1 tablet by mouth Daily. 90 tablet 0    baclofen (LIORESAL) 10 MG tablet Take 1 tablet by mouth Every 8 (Eight) Hours As Needed for Muscle Spasms. 10 tablet 0    Calcium Carb-Cholecalciferol 600-500 MG-UNIT capsule Take  by mouth.      fexofenadine (ALLEGRA) 180 MG tablet Take 1 tablet by mouth Daily. 90 tablet 3    Glucosamine-Chondroit-Vit C-Mn (GLUCOSAMINE CHONDR 1500 COMPLX) capsule Take 1,500 mg by mouth 2 (Two) Times a Day.      hydroCHLOROthiazide 25 MG tablet Take 1 tablet by mouth Daily. 30 tablet 0    lidocaine (LIDODERM) 5 % Place 1 patch on the skin as directed by provider Daily. Remove & Discard patch within 12 hours or as directed by MD 10 patch 0    losartan (COZAAR) 50 MG tablet Take 1 tablet by mouth Daily. 30 tablet 0    Lutein-Zeaxanthin 25-5 MG capsule Take 1 tablet by mouth Daily.      Multiple Vitamins-Minerals (PRESERVISION AREDS 2 PO) Take  by mouth Daily.      Multiple Vitamins-Minerals (PreserVision AREDS 2+Multi Vit) capsule       naproxen (EC NAPROSYN) 500 MG EC tablet Take 1 tablet by mouth Every 12 (Twelve) Hours As Needed for Mild Pain. 10 tablet 0    Omega-3 Fatty Acids (OMEGA-3 FISH OIL) 500 MG capsule Take 1 capsule by mouth Daily.      omeprazole (priLOSEC) 40 MG capsule Take 1 capsule by mouth Daily. 90 capsule 3    oxyCODONE (Roxicodone) 5 MG immediate release tablet Take 1 tablet by mouth Every 6 (Six) Hours As Needed for Moderate Pain. 20 tablet 0    predniSONE (DELTASONE) 10 MG tablet Take 4 tablets by mouth Daily for 3 days, THEN 3 tablets Daily for 3 days, THEN 2 tablets Daily for 3 days, THEN 1 tablet Daily for 3 days. 30 tablet 0     No current facility-administered medications for this visit.       Past Medical History:  Past Medical History:   Diagnosis Date    Acute pharyngitis     Allergic 50 years    Various    Allergy     Arrhythmia     Degenerative arthritis      Degenerative joint disease 11/08/2016    Epistaxis     Glaucoma     Hypertension     Osteopenia of neck of left femur 05/21/2022 5/22 DEXA scan: start calcium 600 mg daily and 800 IU vitamin D3.  Increase weight bearing exercises.  Follow up in 2-3 years for recheck.     Positive KENY (antinuclear antibody)     Right foot pain     Sinusitis     Sleep apnea     Transfusion history     URI (upper respiratory infection)        Past Surgical History:  Past Surgical History:   Procedure Laterality Date    APPENDECTOMY      CATARACT EXTRACTION, BILATERAL      JOINT REPLACEMENT  2013, 2010    Knees    OTHER SURGICAL HISTORY      LT Knee scar excision    REPLACEMENT TOTAL KNEE Right     TOTAL KNEE ARTHROPLASTY Left        Social History:  Social History     Socioeconomic History    Marital status:    Tobacco Use    Smoking status: Never     Passive exposure: Never    Smokeless tobacco: Never   Vaping Use    Vaping status: Never Used    Passive vaping exposure: Yes   Substance and Sexual Activity    Alcohol use: Yes     Alcohol/week: 1.0 - 2.0 standard drink of alcohol     Types: 1 - 2 Glasses of wine per week     Comment: nightly    Drug use: No    Sexual activity: Defer       Family History:  Family History   Problem Relation Age of Onset    Heart disease Mother     Hypertension Mother     Kidney disease Mother     Clotting disorder Mother     Heart attack Mother     Hypertension Father     Stroke Father     Coronary artery disease Father     Clotting disorder Father     Hypertension Brother     Heart disease Brother         Congestive hear failure    Stroke Brother     Heart disease Brother     No Known Problems Brother     No Known Problems Brother     Cancer Maternal Grandmother         Grandmother    No Known Problems Paternal Grandmother     No Known Problems Maternal Grandfather     No Known Problems Paternal Grandfather     Cancer Other     Breast cancer Neg Hx     Ovarian cancer Neg Hx        Objective  "    Objective     Physical Exam:  Physical Exam  Vitals and nursing note reviewed.   Constitutional:       Appearance: Normal appearance.      Comments: Tearful due to pain   HENT:      Head: Normocephalic and atraumatic.      Mouth/Throat:      Mouth: Mucous membranes are moist.   Eyes:      Pupils: Pupils are equal, round, and reactive to light.   Cardiovascular:      Rate and Rhythm: Normal rate and regular rhythm.      Heart sounds: Normal heart sounds.   Pulmonary:      Effort: Pulmonary effort is normal.      Breath sounds: Normal breath sounds.   Abdominal:      General: Bowel sounds are normal.      Palpations: Abdomen is soft.   Musculoskeletal:         General: Normal range of motion.      Cervical back: Normal range of motion.      Comments: Left hip and low back pain to palpation and with movement. Shaky upon standing due to pain in left hip and left low back. Unable to bear weight on left leg   Skin:     General: Skin is warm and dry.   Neurological:      General: No focal deficit present.      Mental Status: She is alert and oriented to person, place, and time.      Coordination: Coordination abnormal.      Gait: Gait abnormal.   Psychiatric:         Mood and Affect: Mood normal.         Behavior: Behavior normal.         Vital Signs:   /88   Pulse 65   Temp 97.1 °F (36.2 °C) (Temporal)   Resp 20   Ht 162.6 cm (64.02\")   Wt 81.1 kg (178 lb 12.8 oz)   SpO2 99%   BMI 30.68 kg/m²            PHQ-9 Score  PHQ-9 Total Score:      Lab Review  Admission on 11/05/2024, Discharged on 11/05/2024   Component Date Value Ref Range Status    Glucose 11/05/2024 122 (H)  65 - 99 mg/dL Final    BUN 11/05/2024 11  8 - 23 mg/dL Final    Creatinine 11/05/2024 0.57  0.57 - 1.00 mg/dL Final    Sodium 11/05/2024 142  136 - 145 mmol/L Final    Potassium 11/05/2024 3.3 (L)  3.5 - 5.2 mmol/L Final    Chloride 11/05/2024 103  98 - 107 mmol/L Final    CO2 11/05/2024 23.3  22.0 - 29.0 mmol/L Final    Calcium 11/05/2024 " 9.9  8.6 - 10.5 mg/dL Final    Total Protein 11/05/2024 7.2  6.0 - 8.5 g/dL Final    Albumin 11/05/2024 4.6  3.5 - 5.2 g/dL Final    ALT (SGPT) 11/05/2024 14  1 - 33 U/L Final    AST (SGOT) 11/05/2024 30  1 - 32 U/L Final    Alkaline Phosphatase 11/05/2024 66  39 - 117 U/L Final    Total Bilirubin 11/05/2024 0.6  0.0 - 1.2 mg/dL Final    Globulin 11/05/2024 2.6  gm/dL Final    A/G Ratio 11/05/2024 1.8  g/dL Final    BUN/Creatinine Ratio 11/05/2024 19.3  7.0 - 25.0 Final    Anion Gap 11/05/2024 15.7 (H)  5.0 - 15.0 mmol/L Final    eGFR 11/05/2024 97.3  >60.0 mL/min/1.73 Final    WBC 11/05/2024 6.86  3.40 - 10.80 10*3/mm3 Final    RBC 11/05/2024 3.96  3.77 - 5.28 10*6/mm3 Final    Hemoglobin 11/05/2024 12.7  12.0 - 15.9 g/dL Final    Hematocrit 11/05/2024 38.6  34.0 - 46.6 % Final    MCV 11/05/2024 97.5 (H)  79.0 - 97.0 fL Final    MCH 11/05/2024 32.1  26.6 - 33.0 pg Final    MCHC 11/05/2024 32.9  31.5 - 35.7 g/dL Final    RDW 11/05/2024 13.4  12.3 - 15.4 % Final    RDW-SD 11/05/2024 48.7  37.0 - 54.0 fl Final    MPV 11/05/2024 10.6  6.0 - 12.0 fL Final    Platelets 11/05/2024 200  140 - 450 10*3/mm3 Final    Neutrophil % 11/05/2024 70.9  42.7 - 76.0 % Final    Lymphocyte % 11/05/2024 20.4  19.6 - 45.3 % Final    Monocyte % 11/05/2024 7.4  5.0 - 12.0 % Final    Eosinophil % 11/05/2024 0.6  0.3 - 6.2 % Final    Basophil % 11/05/2024 0.6  0.0 - 1.5 % Final    Immature Grans % 11/05/2024 0.1  0.0 - 0.5 % Final    Neutrophils, Absolute 11/05/2024 4.86  1.70 - 7.00 10*3/mm3 Final    Lymphocytes, Absolute 11/05/2024 1.40  0.70 - 3.10 10*3/mm3 Final    Monocytes, Absolute 11/05/2024 0.51  0.10 - 0.90 10*3/mm3 Final    Eosinophils, Absolute 11/05/2024 0.04  0.00 - 0.40 10*3/mm3 Final    Basophils, Absolute 11/05/2024 0.04  0.00 - 0.20 10*3/mm3 Final    Immature Grans, Absolute 11/05/2024 0.01  0.00 - 0.05 10*3/mm3 Final        Radiology Results  CT Lumbar Spine Without Contrast    Result Date: 11/5/2024  Impression:  Impression: No acute abnormality of the pelvis or lumbar spine. Chronic findings as above. Electronically Signed: Kee Darden MD  11/5/2024 1:00 PM EST  Workstation ID: [id number]    CT Pelvis Without Contrast    Result Date: 11/5/2024  Impression: Impression: No acute abnormality of the pelvis or lumbar spine. Chronic findings as above. Electronically Signed: Kee Darden MD  11/5/2024 1:00 PM EST  Workstation ID: [id number]      Assessment / Plan         Assessment and Plan   Diagnoses and all orders for this visit:    1. Left hip pain (Primary)  Assessment & Plan:  Patient is tearful in the exam room today with significant left hip pain along with left low back pain and shooting pain down the patient's left leg since 11/3/2024.  No injury reported per the patient.  She is walking unsteadily with a walker and required a wheelchair to leave the clinic today.  The patient has had little improvement with baclofen, lidocaine 5% patch and naproxen.  Plan to prescribe oxycodone 5 mg as needed for pain along with a prednisone taper.  An MRI of the patient's lumbar spine was ordered due to patient's ongoing significant low back and left hip pain.  I advised the patient to go to the emergency department again if pain is not improved with the above prescribed medications.  Follow-up in 1 week    The patient has read and signed the [facility] Controlled Substance Contract.  I will continue to see patient for regular follow up appointments.  They are well controlled on their medication.  ALEXIA is updated every 3 months. The patient is aware of the potential for addiction and dependence.     Orders:  -     MRI Lumbar Spine With & Without Contrast; Future  -     oxyCODONE (Roxicodone) 5 MG immediate release tablet; Take 1 tablet by mouth Every 6 (Six) Hours As Needed for Moderate Pain.  Dispense: 20 tablet; Refill: 0  -     predniSONE (DELTASONE) 10 MG tablet; Take 4 tablets by mouth Daily for 3 days, THEN 3 tablets Daily for  3 days, THEN 2 tablets Daily for 3 days, THEN 1 tablet Daily for 3 days.  Dispense: 30 tablet; Refill: 0    2. Immunization due  Assessment & Plan:  Influenza vaccine given today    Orders:  -     Fluzone High-Dose 65+yrs    3. Mixed hyperlipidemia  Assessment & Plan:   Refills given    Orders:  -     atorvastatin (Lipitor) 40 MG tablet; Take 1 tablet by mouth Daily.  Dispense: 90 tablet; Refill: 0    4. Hypertension, unspecified type  Assessment & Plan:  Medications refilled; blood pressure slightly elevated due to pain    Orders:  -     amLODIPine (NORVASC) 5 MG tablet; Take 1 tablet by mouth Daily for 30 days.  Dispense: 30 tablet; Refill: 0  -     hydroCHLOROthiazide 25 MG tablet; Take 1 tablet by mouth Daily.  Dispense: 30 tablet; Refill: 0  -     losartan (COZAAR) 50 MG tablet; Take 1 tablet by mouth Daily.  Dispense: 30 tablet; Refill: 0    5. Essential hypertension  Assessment & Plan:  Medications refilled; blood pressure slightly elevated due to pain    Orders:  -     amLODIPine (NORVASC) 5 MG tablet; Take 1 tablet by mouth Daily for 30 days.  Dispense: 30 tablet; Refill: 0  -     hydroCHLOROthiazide 25 MG tablet; Take 1 tablet by mouth Daily.  Dispense: 30 tablet; Refill: 0  -     losartan (COZAAR) 50 MG tablet; Take 1 tablet by mouth Daily.  Dispense: 30 tablet; Refill: 0    6. Gastroesophageal reflux disease without esophagitis  Assessment & Plan:  Stable on current regimen; refills given    Orders:  -     omeprazole (priLOSEC) 40 MG capsule; Take 1 capsule by mouth Daily.  Dispense: 90 capsule; Refill: 3                Health Maintenance  Health Maintenance:   Health Maintenance Due   Topic Date Due    DXA SCAN  05/20/2024    LIPID PANEL  08/29/2024        Meds ordered during this visit  New Medications Ordered This Visit   Medications    oxyCODONE (Roxicodone) 5 MG immediate release tablet     Sig: Take 1 tablet by mouth Every 6 (Six) Hours As Needed for Moderate Pain.     Dispense:  20 tablet      Refill:  0    predniSONE (DELTASONE) 10 MG tablet     Sig: Take 4 tablets by mouth Daily for 3 days, THEN 3 tablets Daily for 3 days, THEN 2 tablets Daily for 3 days, THEN 1 tablet Daily for 3 days.     Dispense:  30 tablet     Refill:  0    atorvastatin (Lipitor) 40 MG tablet     Sig: Take 1 tablet by mouth Daily.     Dispense:  90 tablet     Refill:  0    amLODIPine (NORVASC) 5 MG tablet     Sig: Take 1 tablet by mouth Daily for 30 days.     Dispense:  30 tablet     Refill:  0    hydroCHLOROthiazide 25 MG tablet     Sig: Take 1 tablet by mouth Daily.     Dispense:  30 tablet     Refill:  0    losartan (COZAAR) 50 MG tablet     Sig: Take 1 tablet by mouth Daily.     Dispense:  30 tablet     Refill:  0    omeprazole (priLOSEC) 40 MG capsule     Sig: Take 1 capsule by mouth Daily.     Dispense:  90 capsule     Refill:  3       Meds stopped during this visit:  Medications Discontinued During This Encounter   Medication Reason    Flaxseed, Linseed, (FLAX SEED OIL) 1300 MG capsule *Therapy completed    omeprazole (priLOSEC) 40 MG capsule Reorder    atorvastatin (Lipitor) 40 MG tablet Reorder    amLODIPine (NORVASC) 5 MG tablet Reorder    hydroCHLOROthiazide 25 MG tablet Reorder    losartan (COZAAR) 50 MG tablet Reorder        Patient was given instructions and counseling regarding her condition or for health maintenance advice. Please see specific information pulled into the AVS if appropriate.     Follow Up   Return in about 1 week (around 11/13/2024) for left hip pain.    Brianna Nava DO  [facility] [place]     Dictated Utilizing Dragon Dictation: Part of this note may be an electronic transcription/translation of spoken language to printed text using the Dragon Dictation System.    This document has been electronically signed by Brianna Nava DO   November 6, 2024 16:52 EST

## 2024-11-06 NOTE — ASSESSMENT & PLAN NOTE
Patient is tearful in the exam room today with significant left hip pain along with left low back pain and shooting pain down the patient's left leg since 11/3/2024.  No injury reported per the patient.  She is walking unsteadily with a walker and required a wheelchair to leave the clinic today.  The patient has had little improvement with baclofen, lidocaine 5% patch and naproxen.  Plan to prescribe oxycodone 5 mg as needed for pain along with a prednisone taper.  An MRI of the patient's lumbar spine was ordered due to patient's ongoing significant low back and left hip pain.  I advised the patient to go to the emergency department again if pain is not improved with the above prescribed medications.  Follow-up in 1 week    The patient has read and signed the [facility] Controlled Substance Contract.  I will continue to see patient for regular follow up appointments.  They are well controlled on their medication.  ALEXIA is updated every 3 months. The patient is aware of the potential for addiction and dependence.

## 2024-11-08 DIAGNOSIS — M25.552 LEFT HIP PAIN: ICD-10-CM

## 2024-11-08 RX ORDER — OXYCODONE HYDROCHLORIDE 5 MG/1
5 TABLET ORAL EVERY 6 HOURS PRN
Qty: 20 TABLET | Refills: 0 | Status: SHIPPED | OUTPATIENT
Start: 2024-11-08

## 2024-11-08 RX ORDER — BACLOFEN 10 MG/1
10 TABLET ORAL EVERY 8 HOURS PRN
Qty: 30 TABLET | Refills: 0 | Status: SHIPPED | OUTPATIENT
Start: 2024-11-08

## 2024-11-13 ENCOUNTER — OFFICE VISIT (OUTPATIENT)
Dept: FAMILY MEDICINE CLINIC | Facility: CLINIC | Age: 69
End: 2024-11-13
Payer: MEDICARE

## 2024-11-13 VITALS
BODY MASS INDEX: 29.78 KG/M2 | HEART RATE: 75 BPM | SYSTOLIC BLOOD PRESSURE: 120 MMHG | TEMPERATURE: 98.2 F | DIASTOLIC BLOOD PRESSURE: 70 MMHG | OXYGEN SATURATION: 98 % | WEIGHT: 173.6 LBS

## 2024-11-13 DIAGNOSIS — M25.552 LEFT HIP PAIN: Primary | ICD-10-CM

## 2024-11-13 PROCEDURE — 1159F MED LIST DOCD IN RCRD: CPT | Performed by: HOSPITALIST

## 2024-11-13 PROCEDURE — 1160F RVW MEDS BY RX/DR IN RCRD: CPT | Performed by: HOSPITALIST

## 2024-11-13 PROCEDURE — 99214 OFFICE O/P EST MOD 30 MIN: CPT | Performed by: HOSPITALIST

## 2024-11-13 PROCEDURE — 3074F SYST BP LT 130 MM HG: CPT | Performed by: HOSPITALIST

## 2024-11-13 PROCEDURE — 3078F DIAST BP <80 MM HG: CPT | Performed by: HOSPITALIST

## 2024-11-13 PROCEDURE — 1125F AMNT PAIN NOTED PAIN PRSNT: CPT | Performed by: HOSPITALIST

## 2024-11-13 RX ORDER — OXYCODONE HYDROCHLORIDE 5 MG/1
5 TABLET ORAL EVERY 8 HOURS PRN
Qty: 20 TABLET | Refills: 0 | Status: SHIPPED | OUTPATIENT
Start: 2024-11-13

## 2024-11-13 RX ORDER — BACLOFEN 10 MG/1
10 TABLET ORAL EVERY 8 HOURS PRN
Qty: 30 TABLET | Refills: 0 | Status: SHIPPED | OUTPATIENT
Start: 2024-11-13

## 2024-11-13 NOTE — ASSESSMENT & PLAN NOTE
The patient states she is feeling better today compared to 1 week ago.  The patient states she is still having pain in her low back that radiates to her left hip but it is improved compared to prior.  She states that she has been using the oxycodone, baclofen, lidocaine patch, Tylenol and is 4 days from finishing her prednisone taper.  She states that the combination of these medications seems to be improving her symptoms, though she is not back to her baseline.  She states she has not been out of the bed much and is unsure if she can do physical therapy at this time due to pain.  Plan to refill the patient's baclofen and oxycodone.  The patient's MRI lumbar has not been scheduled yet and we are contacting central scheduling now to schedule that as soon as possible.  The patient is going to finish her current prednisone taper and let me know if she needs it extended based on her pain after stopping it.  The patient was instructed to go to the emergency department for any loss of sensation to the bilateral lower extremities or inability to control bowel or bladder.    The patient is taking the following controlled substance(s) on a short term (less than or equal to three months) basis: Oxycodone (OxyContin).  She is taking controlled substances for other (acute hip pain).  A history was obtained from the patient and the following were reviewed: family history, social history, psychiatric history, and substance abuse history.  A baseline assessment was performed and includes a controlled substance agreement, urine drug screen, ALEXIA (within 12 months prior to today's encounter), and a physical exam, if appropriate, was performed within the past year.  It is medically appropriate to prescribe her the medication(s) above.  If applicable, prior treatment records were obtained and reviewed to justify long term prescribing of controlled substances.  The patient was educated on the following: Limited use of the medication,  need to discontinue the medication when her condition resolves, proper storage and disposal of medication(s), and risks and dangers associated with controlled substances including tolerance, dependence, and addiction.  A written informed consent was obtained and includes objectives of treatment, further diagnostic testing if appropriate, and an exit strategy for discontinuing the medication on the condition resolves, if appropriate.  In addition, if appropriate, the patient will be screened for other medical conditions that may impact the prescribing of controlled substances.  Previous treatments have been tried including trials of noncontrolled substances or lower doses of controlled substances.  The controlled medication(s) have been titrated to the level appropriate and necessary and the medication(s) are not causing unacceptable side effects.

## 2024-11-13 NOTE — PROGRESS NOTES
Follow Up Office Visit      Patient Name: Ana Alaniz  : 1955   MRN: [id number]     Chief Complaint:  Pain (Left hip and leg pain.)     History of Present Illness: Ana Alaniz is a 69 y.o. female who is here today for follow up regarding left hip pain.  The patient was seen on 2024 due to acute left hip pain that began upon waking on 11/3/2024.  The patient complained of pain in the posterior hip and low left back.  She denies numbness, tingling and loss of bowels and bladder.  The patient had already been to the emergency department and a CT of her pelvis and lumbar spine without contrast were performed and found to be negative.  The patient had failed baclofen, lidocaine patch, naproxen and was taking some  oxycodone that she had at home for pain.  An MRI of the patient's lumbar spine was ordered with contrast and the patient was given oxycodone and a prednisone taper on 2024.  The patient called back on 2024 and stated that she felt a little bit better and requested refills on her pain medication, which was given.  The patient states she is feeling some better compared to last week but is not back to baseline.  She is using the muscle relaxer, lidocaine patch and the pain medication along with Tylenol.  She states that alternating these are helping her.  She states that she still does not think she would be able to do physical therapy, as she has not been up much until today.  The patient states her MRI has still not been scheduled.      Subjective     Subjective          The following portions of the patient's history were reviewed and updated as appropriate: allergies, current medications, past family history, past medical history, past social history, past surgical history and problem list.    Allergy:   Allergies   Allergen Reactions    Tetanus Toxoids Other (See Comments)     Tested positive on skin testing    Ace Inhibitors Cough        Current Medications:   Current  Outpatient Medications   Medication Sig Dispense Refill    amLODIPine (NORVASC) 5 MG tablet Take 1 tablet by mouth Daily for 30 days. 30 tablet 0    atorvastatin (Lipitor) 40 MG tablet Take 1 tablet by mouth Daily. 90 tablet 0    baclofen (LIORESAL) 10 MG tablet Take 1 tablet by mouth Every 8 (Eight) Hours As Needed for Muscle Spasms. 30 tablet 0    Calcium Carb-Cholecalciferol 600-500 MG-UNIT capsule Take  by mouth.      fexofenadine (ALLEGRA) 180 MG tablet Take 1 tablet by mouth Daily. 90 tablet 3    Glucosamine-Chondroit-Vit C-Mn (GLUCOSAMINE CHONDR 1500 COMPLX) capsule Take 1,500 mg by mouth 2 (Two) Times a Day.      hydroCHLOROthiazide 25 MG tablet Take 1 tablet by mouth Daily. 30 tablet 0    lidocaine (LIDODERM) 5 % Place 1 patch on the skin as directed by provider Daily. Remove & Discard patch within 12 hours or as directed by MD 10 patch 0    losartan (COZAAR) 50 MG tablet Take 1 tablet by mouth Daily. 30 tablet 0    Lutein-Zeaxanthin 25-5 MG capsule Take 1 tablet by mouth Daily.      Multiple Vitamins-Minerals (PRESERVISION AREDS 2 PO) Take  by mouth Daily.      Multiple Vitamins-Minerals (PreserVision AREDS 2+Multi Vit) capsule       Omega-3 Fatty Acids (OMEGA-3 FISH OIL) 500 MG capsule Take 1 capsule by mouth Daily.      omeprazole (priLOSEC) 40 MG capsule Take 1 capsule by mouth Daily. 90 capsule 3    oxyCODONE (Roxicodone) 5 MG immediate release tablet Take 1 tablet by mouth Every 8 (Eight) Hours As Needed for Moderate Pain. 20 tablet 0    predniSONE (DELTASONE) 10 MG tablet Take 4 tablets by mouth Daily for 3 days, THEN 3 tablets Daily for 3 days, THEN 2 tablets Daily for 3 days, THEN 1 tablet Daily for 3 days. 30 tablet 0    naproxen (EC NAPROSYN) 500 MG EC tablet Take 1 tablet by mouth Every 12 (Twelve) Hours As Needed for Mild Pain. (Patient not taking: Reported on 11/13/2024) 10 tablet 0     No current facility-administered medications for this visit.       Objective     Objective     Physical  Exam:  Physical Exam  Vitals and nursing note reviewed.   Constitutional:       Appearance: Normal appearance.   HENT:      Head: Normocephalic and atraumatic.      Mouth/Throat:      Mouth: Mucous membranes are moist.   Eyes:      Pupils: Pupils are equal, round, and reactive to light.   Cardiovascular:      Rate and Rhythm: Normal rate and regular rhythm.      Heart sounds: Normal heart sounds.   Pulmonary:      Effort: Pulmonary effort is normal.      Breath sounds: Normal breath sounds.   Abdominal:      General: Bowel sounds are normal.      Palpations: Abdomen is soft.   Musculoskeletal:      Cervical back: Normal range of motion.      Comments: Positive left hip pain with range of motion and movement, pain to the lumbar spine with movement/walking.  Positive abnormal gait   Skin:     General: Skin is warm and dry.   Neurological:      General: No focal deficit present.      Mental Status: She is alert and oriented to person, place, and time.      Sensory: No sensory deficit.      Gait: Gait abnormal.   Psychiatric:         Mood and Affect: Mood normal.         Behavior: Behavior normal.         Vital Signs:   /70   Pulse 75   Temp 98.2 °F (36.8 °C) (Temporal)   Wt 78.7 kg (173 lb 9.6 oz)   SpO2 98%   BMI 29.78 kg/m²            PHQ-9 Score  PHQ-9 Total Score:      Lab Review  Admission on 11/05/2024, Discharged on 11/05/2024   Component Date Value Ref Range Status    Glucose 11/05/2024 122 (H)  65 - 99 mg/dL Final    BUN 11/05/2024 11  8 - 23 mg/dL Final    Creatinine 11/05/2024 0.57  0.57 - 1.00 mg/dL Final    Sodium 11/05/2024 142  136 - 145 mmol/L Final    Potassium 11/05/2024 3.3 (L)  3.5 - 5.2 mmol/L Final    Chloride 11/05/2024 103  98 - 107 mmol/L Final    CO2 11/05/2024 23.3  22.0 - 29.0 mmol/L Final    Calcium 11/05/2024 9.9  8.6 - 10.5 mg/dL Final    Total Protein 11/05/2024 7.2  6.0 - 8.5 g/dL Final    Albumin 11/05/2024 4.6  3.5 - 5.2 g/dL Final    ALT (SGPT) 11/05/2024 14  1 - 33 U/L  Final    AST (SGOT) 11/05/2024 30  1 - 32 U/L Final    Alkaline Phosphatase 11/05/2024 66  39 - 117 U/L Final    Total Bilirubin 11/05/2024 0.6  0.0 - 1.2 mg/dL Final    Globulin 11/05/2024 2.6  gm/dL Final    A/G Ratio 11/05/2024 1.8  g/dL Final    BUN/Creatinine Ratio 11/05/2024 19.3  7.0 - 25.0 Final    Anion Gap 11/05/2024 15.7 (H)  5.0 - 15.0 mmol/L Final    eGFR 11/05/2024 97.3  >60.0 mL/min/1.73 Final    WBC 11/05/2024 6.86  3.40 - 10.80 10*3/mm3 Final    RBC 11/05/2024 3.96  3.77 - 5.28 10*6/mm3 Final    Hemoglobin 11/05/2024 12.7  12.0 - 15.9 g/dL Final    Hematocrit 11/05/2024 38.6  34.0 - 46.6 % Final    MCV 11/05/2024 97.5 (H)  79.0 - 97.0 fL Final    MCH 11/05/2024 32.1  26.6 - 33.0 pg Final    MCHC 11/05/2024 32.9  31.5 - 35.7 g/dL Final    RDW 11/05/2024 13.4  12.3 - 15.4 % Final    RDW-SD 11/05/2024 48.7  37.0 - 54.0 fl Final    MPV 11/05/2024 10.6  6.0 - 12.0 fL Final    Platelets 11/05/2024 200  140 - 450 10*3/mm3 Final    Neutrophil % 11/05/2024 70.9  42.7 - 76.0 % Final    Lymphocyte % 11/05/2024 20.4  19.6 - 45.3 % Final    Monocyte % 11/05/2024 7.4  5.0 - 12.0 % Final    Eosinophil % 11/05/2024 0.6  0.3 - 6.2 % Final    Basophil % 11/05/2024 0.6  0.0 - 1.5 % Final    Immature Grans % 11/05/2024 0.1  0.0 - 0.5 % Final    Neutrophils, Absolute 11/05/2024 4.86  1.70 - 7.00 10*3/mm3 Final    Lymphocytes, Absolute 11/05/2024 1.40  0.70 - 3.10 10*3/mm3 Final    Monocytes, Absolute 11/05/2024 0.51  0.10 - 0.90 10*3/mm3 Final    Eosinophils, Absolute 11/05/2024 0.04  0.00 - 0.40 10*3/mm3 Final    Basophils, Absolute 11/05/2024 0.04  0.00 - 0.20 10*3/mm3 Final    Immature Grans, Absolute 11/05/2024 0.01  0.00 - 0.05 10*3/mm3 Final        Radiology Results  CT Lumbar Spine Without Contrast    Result Date: 11/5/2024  Impression: Impression: No acute abnormality of the pelvis or lumbar spine. Chronic findings as above. Electronically Signed: Kee Darden MD  11/5/2024 1:00 PM EST  Workstation ID:  [id number]    CT Pelvis Without Contrast    Result Date: 11/5/2024  Impression: Impression: No acute abnormality of the pelvis or lumbar spine. Chronic findings as above. Electronically Signed: Kee Darden MD  11/5/2024 1:00 PM EST  Workstation ID: [id number]      Assessment / Plan         Assessment and Plan   Diagnoses and all orders for this visit:    1. Left hip pain (Primary)  Assessment & Plan:  The patient states she is feeling better today compared to 1 week ago.  The patient states she is still having pain in her low back that radiates to her left hip but it is improved compared to prior.  She states that she has been using the oxycodone, baclofen, lidocaine patch, Tylenol and is 4 days from finishing her prednisone taper.  She states that the combination of these medications seems to be improving her symptoms, though she is not back to her baseline.  She states she has not been out of the bed much and is unsure if she can do physical therapy at this time due to pain.  Plan to refill the patient's baclofen and oxycodone.  The patient's MRI lumbar has not been scheduled yet and we are contacting central scheduling now to schedule that as soon as possible.  The patient is going to finish her current prednisone taper and let me know if she needs it extended based on her pain after stopping it.  The patient was instructed to go to the emergency department for any loss of sensation to the bilateral lower extremities or inability to control bowel or bladder.    Orders:  -     baclofen (LIORESAL) 10 MG tablet; Take 1 tablet by mouth Every 8 (Eight) Hours As Needed for Muscle Spasms.  Dispense: 30 tablet; Refill: 0  -     oxyCODONE (Roxicodone) 5 MG immediate release tablet; Take 1 tablet by mouth Every 8 (Eight) Hours As Needed for Moderate Pain.  Dispense: 20 tablet; Refill: 0                Health Maintenance  Health Maintenance:   Health Maintenance Due   Topic Date Due    DXA SCAN  05/20/2024    LIPID PANEL   08/29/2024        Meds ordered during this visit  New Medications Ordered This Visit   Medications    baclofen (LIORESAL) 10 MG tablet     Sig: Take 1 tablet by mouth Every 8 (Eight) Hours As Needed for Muscle Spasms.     Dispense:  30 tablet     Refill:  0    oxyCODONE (Roxicodone) 5 MG immediate release tablet     Sig: Take 1 tablet by mouth Every 8 (Eight) Hours As Needed for Moderate Pain.     Dispense:  20 tablet     Refill:  0       Meds stopped during this visit:  Medications Discontinued During This Encounter   Medication Reason    baclofen (LIORESAL) 10 MG tablet Reorder    oxyCODONE (Roxicodone) 5 MG immediate release tablet Reorder        Patient was given instructions and counseling regarding her condition or for health maintenance advice. Please see specific information pulled into the AVS if appropriate.     Follow Up   Return in about 2 weeks (around 11/27/2024) for hip p[ain f/u pending mri.    Brianna Nava DO  [facility]     Dictated Utilizing Dragon Dictation: Part of this note may be an electronic transcription/translation of spoken language to printed text using the Dragon Dictation System.    This document has been electronically signed by Brianna Nava DO   November 13, 2024 12:01 EST  Answers submitted by the patient for this visit:  Primary Reason for Visit (Submitted on 11/12/2024)  What is the primary reason for your visit?: Extremity Pain  Lower Extremity Injury Questionnaire (Submitted on 11/12/2024)  Chief Complaint: Extremity pain  Injury: No  Pain location: left hip, left lower leg  Pain quality: aching, burning  Pain - numeric: 8/10  Progression since onset: comes and goes  inability to bear weight: Yes  Additional information: Pain is less if laying down. Leg pain increases with trying to walk (using walker).

## 2024-11-15 DIAGNOSIS — M25.552 LEFT HIP PAIN: Primary | ICD-10-CM

## 2024-11-15 RX ORDER — LIDOCAINE 50 MG/G
1 PATCH TOPICAL EVERY 24 HOURS
Qty: 10 PATCH | Refills: 1 | Status: SHIPPED | OUTPATIENT
Start: 2024-11-15

## 2024-11-20 ENCOUNTER — HOSPITAL ENCOUNTER (OUTPATIENT)
Dept: MRI IMAGING | Facility: HOSPITAL | Age: 69
Discharge: HOME | End: 2024-11-20
Admitting: HOSPITALIST
Payer: MEDICARE

## 2024-11-20 DIAGNOSIS — M25.552 LEFT HIP PAIN: ICD-10-CM

## 2024-11-20 PROCEDURE — 72158 MRI LUMBAR SPINE W/O & W/DYE: CPT

## 2024-11-20 PROCEDURE — 25510000002 GADOBENATE DIMEGLUMINE 529 MG/ML SOLUTION: Performed by: HOSPITALIST

## 2024-11-20 PROCEDURE — A9577 INJ MULTIHANCE: HCPCS | Performed by: HOSPITALIST

## 2024-11-20 RX ADMIN — GADOBENATE DIMEGLUMINE 15 ML: 529 INJECTION, SOLUTION INTRAVENOUS at 12:53

## 2024-11-22 DIAGNOSIS — M25.552 LEFT HIP PAIN: ICD-10-CM

## 2024-11-22 RX ORDER — OXYCODONE HYDROCHLORIDE 5 MG/1
5 TABLET ORAL EVERY 8 HOURS PRN
Qty: 20 TABLET | Refills: 0 | OUTPATIENT
Start: 2024-11-22

## 2024-11-22 RX ORDER — NAPROXEN 500 MG/1
500 TABLET ORAL EVERY 12 HOURS PRN
Qty: 10 TABLET | Refills: 0 | OUTPATIENT
Start: 2024-11-22

## 2024-11-22 NOTE — TELEPHONE ENCOUNTER
Caller: Ana Alaniz SANDRA    Relationship: Self    Best call back number: [phone]     Requested Prescriptions:   Requested Prescriptions     Pending Prescriptions Disp Refills    naproxen (EC NAPROSYN) 500 MG EC tablet 10 tablet 0     Sig: Take 1 tablet by mouth Every 12 (Twelve) Hours As Needed for Mild Pain.    oxyCODONE (Roxicodone) 5 MG immediate release tablet 20 tablet 0     Sig: Take 1 tablet by mouth Every 8 (Eight) Hours As Needed for Moderate Pain.        Pharmacy where request should be sent: [facility] [id number] [address] [facility] [phone]  - [phone] FX     Last office visit with prescribing clinician: 11/13/2024   Last telemedicine visit with prescribing clinician: Visit date not found   Next office visit with prescribing clinician: Visit date not found     Does the patient have less than a 3 day supply:  [x] Yes  [] No    Would you like a call back once the refill request has been completed: [] Yes [x] No    If the office needs to give you a call back, can they leave a voicemail: [] Yes [x] No    Yuki Hunt Rep   11/22/24 14:36 EST

## 2024-11-23 DIAGNOSIS — M25.552 LEFT HIP PAIN: ICD-10-CM

## 2024-11-23 RX ORDER — OXYCODONE HYDROCHLORIDE 5 MG/1
5 TABLET ORAL EVERY 8 HOURS PRN
Qty: 20 TABLET | Refills: 0 | Status: CANCELLED | OUTPATIENT
Start: 2024-11-23

## 2024-11-23 RX ORDER — OXYCODONE HYDROCHLORIDE 5 MG/1
5 TABLET ORAL EVERY 8 HOURS PRN
Qty: 20 TABLET | Refills: 0 | Status: SHIPPED | OUTPATIENT
Start: 2024-11-23

## 2024-11-23 RX ORDER — NAPROXEN 500 MG/1
500 TABLET ORAL EVERY 12 HOURS PRN
Qty: 10 TABLET | Refills: 0 | Status: CANCELLED | OUTPATIENT
Start: 2024-11-23

## 2024-11-23 RX ORDER — NAPROXEN 500 MG/1
500 TABLET ORAL EVERY 12 HOURS PRN
Qty: 14 TABLET | Refills: 0 | Status: SHIPPED | OUTPATIENT
Start: 2024-11-23 | End: 2024-11-25 | Stop reason: SDUPTHER

## 2024-11-23 NOTE — TELEPHONE ENCOUNTER
Please call patient we will give limited refill.  She needs to be seen in person next week if she wants additional refills no further refills will be given over the phone.    Alexi Carnes MD  Family Medicine - [facility]

## 2024-11-23 NOTE — TELEPHONE ENCOUNTER
Patient needs enough medicine to get her through the weekend. She is having severe hip pain. She did call yesterday for refills.

## 2024-11-25 ENCOUNTER — OFFICE VISIT (OUTPATIENT)
Dept: FAMILY MEDICINE CLINIC | Facility: CLINIC | Age: 69
End: 2024-11-25
Payer: MEDICARE

## 2024-11-25 VITALS
DIASTOLIC BLOOD PRESSURE: 60 MMHG | OXYGEN SATURATION: 100 % | HEART RATE: 70 BPM | SYSTOLIC BLOOD PRESSURE: 110 MMHG | WEIGHT: 172.2 LBS | TEMPERATURE: 98 F | BODY MASS INDEX: 29.54 KG/M2

## 2024-11-25 DIAGNOSIS — M25.552 LEFT HIP PAIN: Primary | ICD-10-CM

## 2024-11-25 DIAGNOSIS — M43.06 LUMBAR SPONDYLOLYSIS: ICD-10-CM

## 2024-11-25 PROCEDURE — 99214 OFFICE O/P EST MOD 30 MIN: CPT | Performed by: HOSPITALIST

## 2024-11-25 PROCEDURE — 1160F RVW MEDS BY RX/DR IN RCRD: CPT | Performed by: HOSPITALIST

## 2024-11-25 PROCEDURE — 3078F DIAST BP <80 MM HG: CPT | Performed by: HOSPITALIST

## 2024-11-25 PROCEDURE — 3074F SYST BP LT 130 MM HG: CPT | Performed by: HOSPITALIST

## 2024-11-25 PROCEDURE — 1159F MED LIST DOCD IN RCRD: CPT | Performed by: HOSPITALIST

## 2024-11-25 PROCEDURE — 1125F AMNT PAIN NOTED PAIN PRSNT: CPT | Performed by: HOSPITALIST

## 2024-11-25 RX ORDER — NAPROXEN 500 MG/1
500 TABLET ORAL EVERY 12 HOURS PRN
Qty: 30 TABLET | Refills: 0 | Status: SHIPPED | OUTPATIENT
Start: 2024-11-25

## 2024-11-25 RX ORDER — BACLOFEN 10 MG/1
10 TABLET ORAL 2 TIMES DAILY
Qty: 60 TABLET | Refills: 0 | Status: SHIPPED | OUTPATIENT
Start: 2024-11-25

## 2024-11-25 NOTE — PROGRESS NOTES
Follow Up Office Visit      Patient Name: Ana Alaniz  : 1955   MRN: [id number]     Chief Complaint:  Follow-up and Hip Pain (No concerns.)     History of Present Illness: Ana Alaniz is a 69 y.o. female who is here today for follow up regarding left hip pain.  The patient was seen on 2024 due to acute left hip pain that began upon waking on 11/3/2024.  The patient complained of pain in the posterior hip and low left back.  She denies numbness, tingling and loss of bowels and bladder.  The patient had already been to the emergency department and a CT of her pelvis and lumbar spine without contrast were performed and found to be negative.  The patient had failed baclofen, lidocaine patch, naproxen and was taking some  oxycodone that she had at home for pain.  An MRI of the patient's lumbar spine was ordered with contrast and the patient was given oxycodone and a prednisone taper on 2024.  The patient was seen again in the office on 24 and had improved somewhat but was still having pain. Oxycodone and baclofen was refilled at that visit. The patient called back on 2024 and stated that she felt a little bit better and requested refills on her pain medication, which was given.    MRI Lumbar Spine with/without contrast 24:  Lumbar spondylosis is present, somewhat focally notable at the L4-5 level where a leftward lateralized disc protrusion results in some moderate to severe narrowing of the left neural foramen. No additional significant spinal canal or neuroforaminal   impingement is present.    The patient states that she has been able to sit in the recliner for 3 to 4 hours at a time before having to get back in the bed due to pain.  She states this is improved from prior.  She states that she is taking the oxycodone and baclofen every 9-12 hours now along with Tylenol.  She states she would like to try to wean off the oxycodone in the next week or so.  We discussed  her MRI results and she would like referral to pain management for injection.  The patient does not want referral for surgery consult.    Answers submitted by the patient for this visit:  Primary Reason for Visit (Submitted on 11/25/2024)  What is the primary reason for your visit?: Extremity Pain  Lower Extremity Injury Questionnaire (Submitted on 11/25/2024)  Chief Complaint: Extremity pain  Injury: No  Pain location: left hip, left lower leg  Pain quality: aching, burning  Pain - numeric: 4/10  Progression since onset: improved  tingling: No  inability to bear weight: No  numbness: No  lower extremity swelling: No  redness: No           Subjective     Subjective          The following portions of the patient's history were reviewed and updated as appropriate: allergies, current medications, past family history, past medical history, past social history, past surgical history and problem list.    Allergy:   Allergies   Allergen Reactions    Tetanus Toxoids Other (See Comments)     Tested positive on skin testing    Ace Inhibitors Cough        Current Medications:   Current Outpatient Medications   Medication Sig Dispense Refill    amLODIPine (NORVASC) 5 MG tablet Take 1 tablet by mouth Daily for 30 days. 30 tablet 0    atorvastatin (Lipitor) 40 MG tablet Take 1 tablet by mouth Daily. 90 tablet 0    baclofen (LIORESAL) 10 MG tablet Take 1 tablet by mouth 2 (Two) Times a Day. 60 tablet 0    Calcium Carb-Cholecalciferol 600-500 MG-UNIT capsule Take  by mouth.      fexofenadine (ALLEGRA) 180 MG tablet Take 1 tablet by mouth Daily. 90 tablet 3    Glucosamine-Chondroit-Vit C-Mn (GLUCOSAMINE CHONDR 1500 COMPLX) capsule Take 1,500 mg by mouth 2 (Two) Times a Day.      hydroCHLOROthiazide 25 MG tablet Take 1 tablet by mouth Daily. 30 tablet 0    lidocaine (LIDODERM) 5 % Place 1 patch on the skin as directed by provider Daily. Remove & Discard patch within 12 hours or as directed by MD 10 patch 1    losartan (COZAAR) 50 MG  tablet Take 1 tablet by mouth Daily. 30 tablet 0    Lutein-Zeaxanthin 25-5 MG capsule Take 1 tablet by mouth Daily.      Multiple Vitamins-Minerals (PRESERVISION AREDS 2 PO) Take  by mouth Daily.      Multiple Vitamins-Minerals (PreserVision AREDS 2+Multi Vit) capsule       naproxen (EC NAPROSYN) 500 MG EC tablet Take 1 tablet by mouth Every 12 (Twelve) Hours As Needed for Mild Pain. 30 tablet 0    Omega-3 Fatty Acids (OMEGA-3 FISH OIL) 500 MG capsule Take 1 capsule by mouth Daily.      omeprazole (priLOSEC) 40 MG capsule Take 1 capsule by mouth Daily. 90 capsule 3    oxyCODONE (Roxicodone) 5 MG immediate release tablet Take 1 tablet by mouth Every 8 (Eight) Hours As Needed for Moderate Pain. 20 tablet 0     No current facility-administered medications for this visit.       Objective     Objective     Physical Exam:  Physical Exam  Vitals and nursing note reviewed.   Constitutional:       Appearance: Normal appearance.   HENT:      Head: Normocephalic and atraumatic.      Mouth/Throat:      Mouth: Mucous membranes are moist.   Eyes:      Pupils: Pupils are equal, round, and reactive to light.   Cardiovascular:      Rate and Rhythm: Normal rate and regular rhythm.      Heart sounds: Normal heart sounds.   Pulmonary:      Effort: Pulmonary effort is normal.      Breath sounds: Normal breath sounds.   Abdominal:      General: Bowel sounds are normal.      Palpations: Abdomen is soft.   Musculoskeletal:      Cervical back: Normal range of motion.      Lumbar back: Tenderness present. Decreased range of motion.   Skin:     General: Skin is warm and dry.   Neurological:      General: No focal deficit present.      Mental Status: She is alert and oriented to person, place, and time.   Psychiatric:         Mood and Affect: Mood normal.         Behavior: Behavior normal.         Vital Signs:   /60   Pulse 70   Temp 98 °F (36.7 °C) (Temporal)   Wt 78.1 kg (172 lb 3.2 oz)   SpO2 100%   BMI 29.54 kg/m²             PHQ-9 Score  PHQ-9 Total Score:      Lab Review  Admission on 11/05/2024, Discharged on 11/05/2024   Component Date Value Ref Range Status    Glucose 11/05/2024 122 (H)  65 - 99 mg/dL Final    BUN 11/05/2024 11  8 - 23 mg/dL Final    Creatinine 11/05/2024 0.57  0.57 - 1.00 mg/dL Final    Sodium 11/05/2024 142  136 - 145 mmol/L Final    Potassium 11/05/2024 3.3 (L)  3.5 - 5.2 mmol/L Final    Chloride 11/05/2024 103  98 - 107 mmol/L Final    CO2 11/05/2024 23.3  22.0 - 29.0 mmol/L Final    Calcium 11/05/2024 9.9  8.6 - 10.5 mg/dL Final    Total Protein 11/05/2024 7.2  6.0 - 8.5 g/dL Final    Albumin 11/05/2024 4.6  3.5 - 5.2 g/dL Final    ALT (SGPT) 11/05/2024 14  1 - 33 U/L Final    AST (SGOT) 11/05/2024 30  1 - 32 U/L Final    Alkaline Phosphatase 11/05/2024 66  39 - 117 U/L Final    Total Bilirubin 11/05/2024 0.6  0.0 - 1.2 mg/dL Final    Globulin 11/05/2024 2.6  gm/dL Final    A/G Ratio 11/05/2024 1.8  g/dL Final    BUN/Creatinine Ratio 11/05/2024 19.3  7.0 - 25.0 Final    Anion Gap 11/05/2024 15.7 (H)  5.0 - 15.0 mmol/L Final    eGFR 11/05/2024 97.3  >60.0 mL/min/1.73 Final    WBC 11/05/2024 6.86  3.40 - 10.80 10*3/mm3 Final    RBC 11/05/2024 3.96  3.77 - 5.28 10*6/mm3 Final    Hemoglobin 11/05/2024 12.7  12.0 - 15.9 g/dL Final    Hematocrit 11/05/2024 38.6  34.0 - 46.6 % Final    MCV 11/05/2024 97.5 (H)  79.0 - 97.0 fL Final    MCH 11/05/2024 32.1  26.6 - 33.0 pg Final    MCHC 11/05/2024 32.9  31.5 - 35.7 g/dL Final    RDW 11/05/2024 13.4  12.3 - 15.4 % Final    RDW-SD 11/05/2024 48.7  37.0 - 54.0 fl Final    MPV 11/05/2024 10.6  6.0 - 12.0 fL Final    Platelets 11/05/2024 200  140 - 450 10*3/mm3 Final    Neutrophil % 11/05/2024 70.9  42.7 - 76.0 % Final    Lymphocyte % 11/05/2024 20.4  19.6 - 45.3 % Final    Monocyte % 11/05/2024 7.4  5.0 - 12.0 % Final    Eosinophil % 11/05/2024 0.6  0.3 - 6.2 % Final    Basophil % 11/05/2024 0.6  0.0 - 1.5 % Final    Immature Grans % 11/05/2024 0.1  0.0 - 0.5 % Final     Neutrophils, Absolute 11/05/2024 4.86  1.70 - 7.00 10*3/mm3 Final    Lymphocytes, Absolute 11/05/2024 1.40  0.70 - 3.10 10*3/mm3 Final    Monocytes, Absolute 11/05/2024 0.51  0.10 - 0.90 10*3/mm3 Final    Eosinophils, Absolute 11/05/2024 0.04  0.00 - 0.40 10*3/mm3 Final    Basophils, Absolute 11/05/2024 0.04  0.00 - 0.20 10*3/mm3 Final    Immature Grans, Absolute 11/05/2024 0.01  0.00 - 0.05 10*3/mm3 Final        Radiology Results  MRI Lumbar Spine With & Without Contrast    Result Date: 11/22/2024  Impression: Impression: Lumbar spondylosis is present, somewhat focally notable at the L4-5 level where a leftward lateralized disc protrusion results in some moderate to severe narrowing of the left neural foramen. No additional significant spinal canal or neuroforaminal impingement is present. Electronically Signed: Cortez Miller MD  11/22/2024 4:08 PM EST  Workstation ID: [id number]    CT Lumbar Spine Without Contrast    Result Date: 11/5/2024  Impression: Impression: No acute abnormality of the pelvis or lumbar spine. Chronic findings as above. Electronically Signed: Kee Darden MD  11/5/2024 1:00 PM EST  Workstation ID: [id number]    CT Pelvis Without Contrast    Result Date: 11/5/2024  Impression: Impression: No acute abnormality of the pelvis or lumbar spine. Chronic findings as above. Electronically Signed: Kee aDrden MD  11/5/2024 1:00 PM EST  Workstation ID: [id number]      Assessment / Plan         Assessment and Plan   Diagnoses and all orders for this visit:    1. Left hip pain (Primary)  Assessment & Plan:  Based on MRI findings, plan to refer the patient to pain management for injection.  Oxycodone was refilled a few days ago and patient is going to try to wean it this week.  Refilled baclofen and Naprosyn today.  Advised the patient to try to replace oxycodone doses with Naprosyn or Tylenol.  A physical therapy order was placed and the patient is going to schedule physical therapy once she gets her  injection, as pain is too bad right now to start physical therapy.  The patient was advised to go to the emergency department for any loss of bowels or bladder or loss of sensation to the bilateral lower extremities.    Orders:  -     Ambulatory Referral to Pain Management  -     naproxen (EC NAPROSYN) 500 MG EC tablet; Take 1 tablet by mouth Every 12 (Twelve) Hours As Needed for Mild Pain.  Dispense: 30 tablet; Refill: 0  -     baclofen (LIORESAL) 10 MG tablet; Take 1 tablet by mouth 2 (Two) Times a Day.  Dispense: 60 tablet; Refill: 0  -     Ambulatory Referral to Physical Therapy for Evaluation & Treatment    2. Lumbar spondylolysis  -     Ambulatory Referral to Pain Management  -     naproxen (EC NAPROSYN) 500 MG EC tablet; Take 1 tablet by mouth Every 12 (Twelve) Hours As Needed for Mild Pain.  Dispense: 30 tablet; Refill: 0  -     baclofen (LIORESAL) 10 MG tablet; Take 1 tablet by mouth 2 (Two) Times a Day.  Dispense: 60 tablet; Refill: 0  -     Ambulatory Referral to Physical Therapy for Evaluation & Treatment                Health Maintenance  Health Maintenance:   Health Maintenance Due   Topic Date Due    DXA SCAN  05/20/2024    LIPID PANEL  08/29/2024        Meds ordered during this visit  New Medications Ordered This Visit   Medications    naproxen (EC NAPROSYN) 500 MG EC tablet     Sig: Take 1 tablet by mouth Every 12 (Twelve) Hours As Needed for Mild Pain.     Dispense:  30 tablet     Refill:  0    baclofen (LIORESAL) 10 MG tablet     Sig: Take 1 tablet by mouth 2 (Two) Times a Day.     Dispense:  60 tablet     Refill:  0       Meds stopped during this visit:  Medications Discontinued During This Encounter   Medication Reason    naproxen (EC NAPROSYN) 500 MG EC tablet Reorder    baclofen (LIORESAL) 10 MG tablet Reorder        Patient was given instructions and counseling regarding her condition or for health maintenance advice. Please see specific information pulled into the AVS if appropriate.      Follow Up   Return in about 2 weeks (around 12/9/2024) for left hip pain.    Brianna Nava DO  [facility]     Dictated Utilizing Dragon Dictation: Part of this note may be an electronic transcription/translation of spoken language to printed text using the Dragon Dictation System.    This document has been electronically signed by Brianna Nava DO   November 25, 2024 12:03 EST

## 2024-11-25 NOTE — ASSESSMENT & PLAN NOTE
Based on MRI findings, plan to refer the patient to pain management for injection.  Oxycodone was refilled a few days ago and patient is going to try to wean it this week.  Refilled baclofen and Naprosyn today.  Advised the patient to try to replace oxycodone doses with Naprosyn or Tylenol.  A physical therapy order was placed and the patient is going to schedule physical therapy once she gets her injection, as pain is too bad right now to start physical therapy.  The patient was advised to go to the emergency department for any loss of bowels or bladder or loss of sensation to the bilateral lower extremities.

## 2024-12-09 ENCOUNTER — OFFICE VISIT (OUTPATIENT)
Dept: PAIN MEDICINE | Facility: CLINIC | Age: 69
End: 2024-12-09
Payer: MEDICARE

## 2024-12-09 VITALS — HEIGHT: 64 IN | BODY MASS INDEX: 29.71 KG/M2 | WEIGHT: 174 LBS

## 2024-12-09 DIAGNOSIS — G89.29 CHRONIC LOW BACK PAIN, UNSPECIFIED BACK PAIN LATERALITY, UNSPECIFIED WHETHER SCIATICA PRESENT: Primary | ICD-10-CM

## 2024-12-09 DIAGNOSIS — M54.16 LUMBAR RADICULOPATHY: Primary | ICD-10-CM

## 2024-12-09 DIAGNOSIS — M54.50 CHRONIC LOW BACK PAIN, UNSPECIFIED BACK PAIN LATERALITY, UNSPECIFIED WHETHER SCIATICA PRESENT: Primary | ICD-10-CM

## 2024-12-09 PROCEDURE — G2211 COMPLEX E/M VISIT ADD ON: HCPCS | Performed by: STUDENT IN AN ORGANIZED HEALTH CARE EDUCATION/TRAINING PROGRAM

## 2024-12-09 PROCEDURE — 99204 OFFICE O/P NEW MOD 45 MIN: CPT | Performed by: STUDENT IN AN ORGANIZED HEALTH CARE EDUCATION/TRAINING PROGRAM

## 2024-12-09 PROCEDURE — 1159F MED LIST DOCD IN RCRD: CPT | Performed by: STUDENT IN AN ORGANIZED HEALTH CARE EDUCATION/TRAINING PROGRAM

## 2024-12-09 PROCEDURE — 1125F AMNT PAIN NOTED PAIN PRSNT: CPT | Performed by: STUDENT IN AN ORGANIZED HEALTH CARE EDUCATION/TRAINING PROGRAM

## 2024-12-09 PROCEDURE — 1160F RVW MEDS BY RX/DR IN RCRD: CPT | Performed by: STUDENT IN AN ORGANIZED HEALTH CARE EDUCATION/TRAINING PROGRAM

## 2024-12-09 RX ORDER — GABAPENTIN 100 MG/1
100 CAPSULE ORAL 2 TIMES DAILY
Qty: 60 CAPSULE | Refills: 0 | Status: SHIPPED | OUTPATIENT
Start: 2024-12-09

## 2024-12-09 RX ORDER — AMLODIPINE BESYLATE 5 MG/1
TABLET ORAL
COMMUNITY
Start: 2024-11-29

## 2024-12-09 NOTE — PROGRESS NOTES
Referring Physician: Brianna Nava DO  [facility] [address]    Primary Physician: Brianna Nava DO    CHIEF COMPLAINT or REASON FOR VISIT: Back Pain and Hip Pain (New patient)      Initial history of present illness on 12/09/2024:  Ms. Ana Alaniz is 69 y.o. female who presents as a new patient referral for evaluation and treatment of left-sided low back pain with radiation to left lower extremity and ankle.  Pain started in very early November without any specific inciting event or trauma.  She woke up with a new onset left-sided radicular type pain.  She does have numbness tingling and burning pain to the lateral aspect of her calf originating from her lumbar spine.  She had intramuscular and ultimately oral steroids which did ameliorate her symptoms.  She is using her walker at this time.  She has not yet started physical therapy.  She has had significant ongoing pain poorly responsive to acetaminophen, oxycodone.  Patient denies any bowel or bladder dysfunction, lower extremity weakness, new onset saddle anesthesia or unexplained weight loss.   She denies any history of spinal surgery or intervention.    Interval history:    Interventions:      Objective Pain Scoring:   BRIEF PAIN INVENTORY:  Total score:   Pain Score    12/09/24 1015   PainSc:   3   PainLoc: Leg      PHQ-2: 3  PHQ-9: 4  Opioid Risk Tool:         Review of Systems:   ROS negative except as otherwise noted     Past Medical History:   Past Medical History:   Diagnosis Date    Acute pharyngitis     Allergic 50 years    Various    Allergy     Arrhythmia     Degenerative arthritis     Degenerative joint disease 11/08/2016    Epistaxis     Glaucoma     Hypertension     Joint pain     Many  years ago    Low back pain     Years ago    Osteopenia of neck of left femur 05/21/2022 5/22 DEXA scan: start calcium 600 mg daily and 800 IU vitamin D3.  Increase weight bearing exercises.  Follow up in 2-3 years for  recheck.     Positive KENY (antinuclear antibody)     Right foot pain     Sinusitis     Sleep apnea     Transfusion history     URI (upper respiratory infection)          Past Surgical History:   Past Surgical History:   Procedure Laterality Date    APPENDECTOMY      CATARACT EXTRACTION, BILATERAL      JOINT REPLACEMENT  2013, 2010    Knees    OTHER SURGICAL HISTORY      LT Knee scar excision    REPLACEMENT TOTAL KNEE Right     TOTAL KNEE ARTHROPLASTY Left          Family History   Family History   Problem Relation Age of Onset    Heart disease Mother     Hypertension Mother     Kidney disease Mother     Clotting disorder Mother     Heart attack Mother     Hypertension Father     Stroke Father     Coronary artery disease Father     Clotting disorder Father     Hypertension Brother     Heart disease Brother         Congestive hear failure    Stroke Brother     Heart disease Brother     No Known Problems Brother     No Known Problems Brother     Cancer Maternal Grandmother         Grandmother    No Known Problems Paternal Grandmother     No Known Problems Maternal Grandfather     No Known Problems Paternal Grandfather     Cancer Other     Breast cancer Neg Hx     Ovarian cancer Neg Hx          Social History   Social History     Socioeconomic History    Marital status:    Tobacco Use    Smoking status: Never     Passive exposure: Never    Smokeless tobacco: Never   Vaping Use    Vaping status: Never Used    Passive vaping exposure: Yes   Substance and Sexual Activity    Alcohol use: Not Currently     Alcohol/week: 1.0 - 2.0 standard drink of alcohol     Types: 1 - 2 Glasses of wine per week     Comment: nightly    Drug use: No    Sexual activity: Defer        Medications:     Current Outpatient Medications:     amLODIPine (NORVASC) 5 MG tablet, , Disp: , Rfl:     atorvastatin (Lipitor) 40 MG tablet, Take 1 tablet by mouth Daily., Disp: 90 tablet, Rfl: 0    baclofen (LIORESAL) 10 MG tablet, Take 1 tablet by mouth  "2 (Two) Times a Day., Disp: 60 tablet, Rfl: 0    Calcium Carb-Cholecalciferol 600-500 MG-UNIT capsule, Take  by mouth., Disp: , Rfl:     fexofenadine (ALLEGRA) 180 MG tablet, Take 1 tablet by mouth Daily., Disp: 90 tablet, Rfl: 3    Glucosamine-Chondroit-Vit C-Mn (GLUCOSAMINE CHONDR 1500 COMPLX) capsule, Take 1,500 mg by mouth 2 (Two) Times a Day., Disp: , Rfl:     hydroCHLOROthiazide 25 MG tablet, Take 1 tablet by mouth Daily., Disp: 30 tablet, Rfl: 0    lidocaine (LIDODERM) 5 %, Place 1 patch on the skin as directed by provider Daily. Remove & Discard patch within 12 hours or as directed by MD, Disp: 10 patch, Rfl: 1    losartan (COZAAR) 50 MG tablet, Take 1 tablet by mouth Daily., Disp: 30 tablet, Rfl: 0    Lutein-Zeaxanthin 25-5 MG capsule, Take 1 tablet by mouth Daily., Disp: , Rfl:     Multiple Vitamins-Minerals (PRESERVISION AREDS 2 PO), Take  by mouth Daily., Disp: , Rfl:     Multiple Vitamins-Minerals (PreserVision AREDS 2+Multi Vit) capsule, , Disp: , Rfl:     naproxen (EC NAPROSYN) 500 MG EC tablet, Take 1 tablet by mouth Every 12 (Twelve) Hours As Needed for Mild Pain., Disp: 30 tablet, Rfl: 0    Omega-3 Fatty Acids (OMEGA-3 FISH OIL) 500 MG capsule, Take 1 capsule by mouth Daily., Disp: , Rfl:     omeprazole (priLOSEC) 40 MG capsule, Take 1 capsule by mouth Daily., Disp: 90 capsule, Rfl: 3    oxyCODONE (Roxicodone) 5 MG immediate release tablet, Take 1 tablet by mouth Every 8 (Eight) Hours As Needed for Moderate Pain., Disp: 20 tablet, Rfl: 0        Physical Exam:     Vitals:    12/09/24 1015   Weight: 78.9 kg (174 lb)   Height: 162.6 cm (64\")   PainSc:   3   PainLoc: Leg        General: Alert and oriented, No acute distress.   HEENT: Normocephalic, atraumatic.   Cardiovascular: No gross edema  Respiratory: Respirations are non-labored    Thoracic Spine:   Inspection: no gross abnormality  Paraspinal muscle palpation: nontender  Spinous process palpation: nontender    Lumbar Spine:   No masses or " atrophy  Range of motion - Flexion normal. Extension reduced.    Facet Loading: Negative bilaterally  Facet Palpation - Nontender   Pawel finger/Gaenslen's/Scottie's/STACIE/Thigh thrust -   Straight leg raise/slump test: Positive left  Multifidus toe-touch test:    Motor Exam:      Strength: Rate on 1-5 scale Right Left    L1/2- hip flexion 5/5  5/5    L3- knee extension 5/5  5/5    L4- ankle dorsiflexion 5/5  5/5    L5- great toe extension 5/5  5/5    S1- ankle plantarflexion 5/5  5/5    Sensory Exam: Altered sensation left L4 dermatome      Neurologic: Cranial Nerves II-XII are grossly intact.     Psychiatric: Cooperative.   Gait: Antalgic flexed forward  Assistive Devices: Walker    Imaging Studies:   No results found for this or any previous visit.        Independent review of radiographic imaging: Available for my interpretation is lumbar MRI dated November 20, 2024 demonstrating left-sided L4/L5 disc bulge causing neuroforaminal stenosis; there is facet arthropathy; there is multifidus and lumbar paraspinal muscle atrophy.    Impression & Plan:       12/09/2024: Ana Alaniz is a 69 y.o. female with past medical history significant for LUKASZ, HTN, GERD who presents to the pain clinic for evaluation and treatment of chronic low back pain the patient left lower extremity.  MRI interpretation as above.  Evaluation consistent with lumbar radiculopathy.  We discussed epidural steroid injection to improve pain.  If greater than 50% relief for at least 2-3 months can consider repeat as needed every 3 to 4 months.  I had a discussion with the patient regarding the risks of the procedure including bleeding, infection, damage to surrounding structures.  We discussed the potential adverse effects of corticosteroid injection including flushing of the face, lipodystrophy, skin discoloration, elevated blood glucose, increased blood pressure.  Risks of frequent steroid administration include weight gain, hormonal changes,  mood changes, osteoporosis.  Additionally will start gabapentin.    1. Lumbar radiculopathy        PLAN:  1. Medication Recommendations: Recommend Voltaren topical, NSAIDs, Tylenol.  Can trial turmeric 500 mg twice daily if NSAID contraindicated.  -Start gabapentin 100 mg twice a day 60 pills no refills  -As part of this patient's treatment plan, patient may be prescribed controlled substances. The patient has been made aware of appropriate use of such medications, including potential risk of somnolence, limited ability to drive and /or work safely, and potential for dependence or overdose. It has also been made clear that these medications are for use by this patient only, without concomitant use of alcohol or other substances unless prescribed. Controlled substance status of medication discussed with patient, discussed risks of medication including abuse potential and diversion potential and need to follow up for reevaluation appointment in order to receive further refills.  [place] was reviewed and compliant.    2. Physical Therapy: Referral given today    3. Psychological: defer    4. Complementary and alternative (CAM) Therapies:     5. Labs/Diagnostic studies: None indicated     6. Imaging: MRI independently interpreted and reviewed with patient    7. Interventions: Schedule left L4/L5 transforaminal epidural steroid injection ([id number]).    8. Referrals: PT    9. Records: PCP note reviewed    10. Lifestyle goals:    Follow-up 1 month      [facility]  Dio Holder MD          Quality Metrics:

## 2024-12-11 ENCOUNTER — PATIENT ROUNDING (BHMG ONLY) (OUTPATIENT)
Dept: PAIN MEDICINE | Facility: CLINIC | Age: 69
End: 2024-12-11
Payer: MEDICARE

## 2024-12-11 NOTE — PROGRESS NOTES
A MY-CHART MESSAGE HAS BEEN SENT TO THE PATIENT FOR PATIENT ROUNDING WITH [facility].

## 2024-12-17 ENCOUNTER — OUTSIDE FACILITY SERVICE (OUTPATIENT)
Dept: PAIN MEDICINE | Facility: CLINIC | Age: 69
End: 2024-12-17
Payer: MEDICARE

## 2024-12-17 ENCOUNTER — TELEPHONE (OUTPATIENT)
Dept: FAMILY MEDICINE CLINIC | Facility: CLINIC | Age: 69
End: 2024-12-17
Payer: MEDICARE

## 2024-12-17 ENCOUNTER — DOCUMENTATION (OUTPATIENT)
Dept: PAIN MEDICINE | Facility: CLINIC | Age: 69
End: 2024-12-17

## 2024-12-17 NOTE — TELEPHONE ENCOUNTER
Caller: Ana Alaniz    Relationship: Self    Best call back number: [phone]    What was the call regarding: PT WENT ON 12/17/24 AND SAW DR CHE AND GOT A STEROID SHOT IN HER HIP AND LEG    DR CHE PRESCRIBED HER GABAPENTIN 100MG 2 TIMES DAILY    SHE NEEDS A NAPROXIN REFILL BUT WANTS TO SEE IF SHE DOES OR NOT SINCE SHE IS ON THE GABAPENTIN NOW    SHOULD SHE COME IN TO SEE YOU BEFORE HER NEXT APPT WITH  ON 1/15/25.    Is it okay if the provider responds through MyChart: NO

## 2024-12-17 NOTE — PROGRESS NOTES
[facility]  [address] [place] [address]      PROCEDURE: Fluoroscopically-guided  Lumbar Transforaminal Epidural Steroid Injection - LEFT L4/5    PRE-OP DIAGNOSIS: Lumbar radiculopathy  POST-OP DIAGNOSIS: Lumbar radiculopathy    BLOOD THINNERS (ANTIPLATELETS/ANTICOAGULANTS): Were discussed with the patient and [place] Guidelines were followed.     CONSENT: Risks, benefits and options were explained to the patient, all questions were answered and written informed consent was obtained.     ANESTHESIA: Moderate sedation was required to maintain comfort, safety, and cooperation during the procedure.  The duration of sedation service was over 10 minutes.  Patient received 1mg IV Versed and 50mcg IV fentanyl.  Independent observation and monitoring was performed by Elena Ch.  The patient's level of consciousness and physiologic status was continually monitored with pulse oximetry, EKG from 1102 to 1114.  There were no complications or adverse events during sedation.  After the sedation patient was taken to the recovery area.    PROCEDURE NOTE: A pre-procedural time out was performed to confirm the correct patient, procedure, side, and site. Standard ASA monitors were applied and oxygen via nasal cannula was provided. All proceduralists donned sterile gloves with masks and surgical hats. The patient was placed prone with pillow under the abdomen and all pressure points padded. The patient's lumbar spine was prepped in standard fashion using Chlorhexidine and draped with sterile towels. The target neuroforamen was identified using oblique fluoroscopy and the superior vertebral body endplate squared. The overlying skin and subcutaneous tissue was anesthetized with 1% lidocaine. A 22 gauge 3.5 inch spinal needle with bent tip was incrementally advanced using intermittent fluoroscopy to the 6 o'clock position of the target pedicle in the mid-neuroforamen using oblique, AP and  lateral intermittent fluoroscopy. After negative aspiration of blood and cerebrospinal fluid, needle placement was confirmed with 1 ml of omnipaque 180 mgI/ml contrast using AP fluoroscopic imaging. Imaging revealed a clear outline of the target spinal nerve with proximal spread of agent through the neuroforamen medially to the epidural space, without evidence of intravascular or intrathecal spread. After negative aspiration, a mixture containing dexamethasone 5 mg steroid and lidocaine 1% - 1 ml local anesthetic for a total volume of 1.5 ml was injected under direct visualization with fluoroscopy. The needle was flushed, removed and a bandage applied.     EBL: None     COMPLICATIONS: None     The patient was monitored in recovery area until all discharge criteria were met. Vital signs remained stable throughout the procedure and in the recovery area. There were no immediate complications and the patient tolerated the procedure well. Sensory and motor exam was unchanged from baseline. The patient received written discharge instructions prior to discharge.     FOLLOW UP: As scheduled     ADDITIONAL NOTES:         [facility]       Dio Holder MD     CODES:  [id number]  [id number]

## 2024-12-18 DIAGNOSIS — M43.06 LUMBAR SPONDYLOLYSIS: ICD-10-CM

## 2024-12-18 DIAGNOSIS — M25.552 LEFT HIP PAIN: ICD-10-CM

## 2024-12-18 RX ORDER — NAPROXEN 500 MG/1
500 TABLET ORAL EVERY 12 HOURS PRN
Qty: 30 TABLET | Refills: 2 | Status: SHIPPED | OUTPATIENT
Start: 2024-12-18

## 2024-12-27 DIAGNOSIS — I10 HYPERTENSION, UNSPECIFIED TYPE: ICD-10-CM

## 2024-12-27 DIAGNOSIS — I10 ESSENTIAL HYPERTENSION: ICD-10-CM

## 2024-12-28 RX ORDER — AMLODIPINE BESYLATE 5 MG/1
5 TABLET ORAL DAILY
Qty: 30 TABLET | Refills: 0 | Status: SHIPPED | OUTPATIENT
Start: 2024-12-28

## 2024-12-28 RX ORDER — LOSARTAN POTASSIUM 50 MG/1
50 TABLET ORAL DAILY
Qty: 30 TABLET | Refills: 0 | Status: SHIPPED | OUTPATIENT
Start: 2024-12-28

## 2024-12-28 RX ORDER — HYDROCHLOROTHIAZIDE 25 MG/1
25 TABLET ORAL DAILY
Qty: 30 TABLET | Refills: 0 | Status: SHIPPED | OUTPATIENT
Start: 2024-12-28

## 2025-01-03 DIAGNOSIS — I10 HYPERTENSION, UNSPECIFIED TYPE: ICD-10-CM

## 2025-01-03 DIAGNOSIS — I10 ESSENTIAL HYPERTENSION: ICD-10-CM

## 2025-01-03 DIAGNOSIS — E78.2 MIXED HYPERLIPIDEMIA: ICD-10-CM

## 2025-01-03 RX ORDER — HYDROCHLOROTHIAZIDE 25 MG/1
25 TABLET ORAL DAILY
Qty: 90 TABLET | Refills: 2 | Status: SHIPPED | OUTPATIENT
Start: 2025-01-03

## 2025-01-03 RX ORDER — AMLODIPINE BESYLATE 5 MG/1
5 TABLET ORAL DAILY
Qty: 90 TABLET | Refills: 2 | Status: SHIPPED | OUTPATIENT
Start: 2025-01-03

## 2025-01-03 RX ORDER — LOSARTAN POTASSIUM 50 MG/1
50 TABLET ORAL DAILY
Qty: 90 TABLET | Refills: 2 | Status: SHIPPED | OUTPATIENT
Start: 2025-01-03

## 2025-01-03 RX ORDER — ATORVASTATIN CALCIUM 40 MG/1
40 TABLET, FILM COATED ORAL DAILY
Qty: 90 TABLET | Refills: 2 | Status: SHIPPED | OUTPATIENT
Start: 2025-01-03

## 2025-01-15 ENCOUNTER — OFFICE VISIT (OUTPATIENT)
Dept: PAIN MEDICINE | Facility: CLINIC | Age: 70
End: 2025-01-15
Payer: MEDICARE

## 2025-01-15 VITALS — BODY MASS INDEX: 28.85 KG/M2 | WEIGHT: 169 LBS | HEIGHT: 64 IN

## 2025-01-15 DIAGNOSIS — M54.16 LUMBAR RADICULOPATHY: Primary | ICD-10-CM

## 2025-01-15 NOTE — PROGRESS NOTES
Referring Physician: No referring provider defined for this encounter.    Primary Physician: Brianna Nava DO    CHIEF COMPLAINT or REASON FOR VISIT: Follow-up (Post Lumbar Transforaminal Epidural Steroid Injection - LEFT L4/5) and Back Pain      Initial history of present illness on 12/09/2024:  Ms. Ana Alaniz is 70 y.o. female who presents as a new patient referral for evaluation and treatment of left-sided low back pain with radiation to left lower extremity and ankle.  Pain started in very early November without any specific inciting event or trauma.  She woke up with a new onset left-sided radicular type pain.  She does have numbness tingling and burning pain to the lateral aspect of her calf originating from her lumbar spine.  She had intramuscular and ultimately oral steroids which did ameliorate her symptoms.  She is using her walker at this time.  She has not yet started physical therapy.  She has had significant ongoing pain poorly responsive to acetaminophen, oxycodone.  Patient denies any bowel or bladder dysfunction, lower extremity weakness, new onset saddle anesthesia or unexplained weight loss.   She denies any history of spinal surgery or intervention.    Interval history:  Patient returns to clinic after undergoing a left-sided L4/5 transforaminal epidural steroid injection.  Patient reports good relief from her procedure.  She has noticed a significant reduction in her pain.  She has noticed significant improvement in her ability to ambulate.  She has been without gabapentin for approximately 1 week and has not noticed any increased or return in her symptoms.  She continues to complete physical therapy and at home exercises with good relief.  No new questions or complaints at this time.  She is happy with the relief that she has received.    Interventions:  12/17/2024: Left L4/5 TFESI with 80% relief ongoing    Objective Pain Scoring:   BRIEF PAIN INVENTORY:  Total score:   Pain Score     01/15/25 1113   PainSc:   3   PainLoc: Back        PHQ-2: 2  PHQ-9: 5  Opioid Risk Tool:         Review of Systems:   ROS negative except as otherwise noted     Past Medical History:   Past Medical History:   Diagnosis Date    Acute pharyngitis     Allergic 50 years    Various    Allergy     Arrhythmia     Degenerative arthritis     Degenerative joint disease 11/08/2016    Epistaxis     Glaucoma     Hypertension     Joint pain     Many  years ago    Low back pain     Years ago    Osteopenia of neck of left femur 05/21/2022 5/22 DEXA scan: start calcium 600 mg daily and 800 IU vitamin D3.  Increase weight bearing exercises.  Follow up in 2-3 years for recheck.     Positive KENY (antinuclear antibody)     Right foot pain     Sinusitis     Sleep apnea     Transfusion history     URI (upper respiratory infection)          Past Surgical History:   Past Surgical History:   Procedure Laterality Date    APPENDECTOMY      CATARACT EXTRACTION, BILATERAL      EPIDURAL BLOCK      Birth of kids    JOINT REPLACEMENT  2013, 2010    Knees    OTHER SURGICAL HISTORY      LT Knee scar excision    REPLACEMENT TOTAL KNEE Right     TOTAL KNEE ARTHROPLASTY Left          Family History   Family History   Problem Relation Age of Onset    Heart disease Mother     Hypertension Mother     Kidney disease Mother     Clotting disorder Mother     Heart attack Mother     Hypertension Father     Stroke Father     Coronary artery disease Father     Clotting disorder Father     Hypertension Brother     Heart disease Brother         Congestive hear failure    Stroke Brother     Heart disease Brother     No Known Problems Brother     No Known Problems Brother     Cancer Maternal Grandmother         Grandmother    No Known Problems Paternal Grandmother     No Known Problems Maternal Grandfather     No Known Problems Paternal Grandfather     Cancer Other     Breast cancer Neg Hx     Ovarian cancer Neg Hx          Social History   Social History  "    Socioeconomic History    Marital status:    Tobacco Use    Smoking status: Never     Passive exposure: Never    Smokeless tobacco: Never   Vaping Use    Vaping status: Never Used    Passive vaping exposure: Yes   Substance and Sexual Activity    Alcohol use: Not Currently     Alcohol/week: 1.0 - 2.0 standard drink of alcohol     Types: 1 - 2 Glasses of wine per week     Comment: nightly    Drug use: No    Sexual activity: Defer        Medications:     Current Outpatient Medications:     amLODIPine (NORVASC) 5 MG tablet, Take 1 tablet by mouth Daily., Disp: 90 tablet, Rfl: 2    atorvastatin (Lipitor) 40 MG tablet, Take 1 tablet by mouth Daily., Disp: 90 tablet, Rfl: 2    Calcium Carb-Cholecalciferol 600-500 MG-UNIT capsule, Take  by mouth., Disp: , Rfl:     fexofenadine (ALLEGRA) 180 MG tablet, Take 1 tablet by mouth Daily., Disp: 90 tablet, Rfl: 3    Glucosamine-Chondroit-Vit C-Mn (GLUCOSAMINE CHONDR 1500 COMPLX) capsule, Take 1,500 mg by mouth 2 (Two) Times a Day., Disp: , Rfl:     hydroCHLOROthiazide 25 MG tablet, Take 1 tablet by mouth Daily., Disp: 90 tablet, Rfl: 2    losartan (COZAAR) 50 MG tablet, Take 1 tablet by mouth Daily., Disp: 90 tablet, Rfl: 2    Lutein-Zeaxanthin 25-5 MG capsule, Take 1 tablet by mouth Daily., Disp: , Rfl:     Multiple Vitamins-Minerals (PRESERVISION AREDS 2 PO), Take  by mouth Daily., Disp: , Rfl:     Multiple Vitamins-Minerals (PreserVision AREDS 2+Multi Vit) capsule, , Disp: , Rfl:     naproxen (EC NAPROSYN) 500 MG EC tablet, Take 1 tablet by mouth Every 12 (Twelve) Hours As Needed for Mild Pain., Disp: 30 tablet, Rfl: 2    Omega-3 Fatty Acids (OMEGA-3 FISH OIL) 500 MG capsule, Take 1 capsule by mouth Daily., Disp: , Rfl:     omeprazole (priLOSEC) 40 MG capsule, Take 1 capsule by mouth Daily., Disp: 90 capsule, Rfl: 3        Physical Exam:     Vitals:    01/15/25 1113   Weight: 76.7 kg (169 lb)   Height: 162.6 cm (64.02\")   PainSc:   3   PainLoc: Back        General: " Alert and oriented, No acute distress.   HEENT: Normocephalic, atraumatic.   Cardiovascular: No gross edema  Respiratory: Respirations are non-labored    Thoracic Spine:   Inspection: no gross abnormality  Paraspinal muscle palpation: nontender  Spinous process palpation: nontender    Lumbar Spine:   No masses or atrophy  Range of motion - Flexion normal. Extension reduced.    Facet Loading: Negative bilaterally  Facet Palpation - Nontender   Pawel finger/Gaenslen's/Scottie's/STACIE/Thigh thrust -   Straight leg raise/slump test: Positive left  Multifidus toe-touch test:    Motor Exam:      Strength: Rate on 1-5 scale Right Left    L1/2- hip flexion 5/5  5/5    L3- knee extension 5/5  5/5    L4- ankle dorsiflexion 5/5  5/5    L5- great toe extension 5/5  5/5    S1- ankle plantarflexion 5/5  5/5    Sensory Exam: Altered sensation left L4 dermatome      Neurologic: Cranial Nerves II-XII are grossly intact.     Psychiatric: Cooperative.   Gait: Antalgic flexed forward  Assistive Devices: Walker    Imaging Studies:   No results found for this or any previous visit.        Independent review of radiographic imaging: Available for my interpretation is lumbar MRI dated November 20, 2024 demonstrating left-sided L4/L5 disc bulge causing neuroforaminal stenosis; there is facet arthropathy; there is multifidus and lumbar paraspinal muscle atrophy.    Impression & Plan:       12/09/2024: Ana Alaniz is a 70 y.o. female with past medical history significant for LUKASZ, HTN, GERD who presents to the pain clinic for evaluation and treatment of chronic low back pain the patient left lower extremity.  MRI interpretation as above.  Evaluation consistent with lumbar radiculopathy.  We discussed epidural steroid injection to improve pain.  If greater than 50% relief for at least 2-3 months can consider repeat as needed every 3 to 4 months.  I had a discussion with the patient regarding the risks of the procedure including bleeding,  infection, damage to surrounding structures.  We discussed the potential adverse effects of corticosteroid injection including flushing of the face, lipodystrophy, skin discoloration, elevated blood glucose, increased blood pressure.  Risks of frequent steroid administration include weight gain, hormonal changes, mood changes, osteoporosis.  Additionally will start gabapentin.  1/15/2025: Discontinue gabapentin.  Good relief from TFESI.  Can consider repeat if needed.    1. Lumbar radiculopathy          PLAN:  1. Medication Recommendations: Recommend Voltaren topical, NSAIDs, Tylenol.  Can trial turmeric 500 mg twice daily if NSAID contraindicated.  -Discontinue gabapentin; patient has been without this medication for approximately 1 week and has not noticed any increase or return in her symptoms.    2. Physical Therapy: Continue PT/HEP    3. Psychological: defer    4. Complementary and alternative (CAM) Therapies:     5. Labs/Diagnostic studies: None indicated     6. Imagin. Interventions: Can consider repeat left L4/L5 transforaminal epidural steroid injection ([id number]).    8. Referrals:     9. Records:     10. Lifestyle goals:    Follow-up 3 to 4 months      [facility]  Tammy Blanchard PA-C        Quality Metrics:

## 2025-01-20 ENCOUNTER — OFFICE VISIT (OUTPATIENT)
Dept: FAMILY MEDICINE CLINIC | Facility: CLINIC | Age: 70
End: 2025-01-20
Payer: MEDICARE

## 2025-01-20 ENCOUNTER — LAB (OUTPATIENT)
Dept: LAB | Facility: HOSPITAL | Age: 70
End: 2025-01-20
Payer: MEDICARE

## 2025-01-20 VITALS
TEMPERATURE: 97.4 F | OXYGEN SATURATION: 98 % | SYSTOLIC BLOOD PRESSURE: 124 MMHG | HEIGHT: 64 IN | RESPIRATION RATE: 20 BRPM | WEIGHT: 170.4 LBS | HEART RATE: 75 BPM | DIASTOLIC BLOOD PRESSURE: 80 MMHG | BODY MASS INDEX: 29.09 KG/M2

## 2025-01-20 DIAGNOSIS — M25.552 LEFT HIP PAIN: ICD-10-CM

## 2025-01-20 DIAGNOSIS — M43.06 LUMBAR SPONDYLOLYSIS: ICD-10-CM

## 2025-01-20 DIAGNOSIS — I10 ESSENTIAL HYPERTENSION: ICD-10-CM

## 2025-01-20 DIAGNOSIS — E78.2 MIXED HYPERLIPIDEMIA: ICD-10-CM

## 2025-01-20 DIAGNOSIS — K21.9 GASTROESOPHAGEAL REFLUX DISEASE WITHOUT ESOPHAGITIS: ICD-10-CM

## 2025-01-20 DIAGNOSIS — D64.9 ANEMIA, UNSPECIFIED TYPE: ICD-10-CM

## 2025-01-20 LAB
ALBUMIN SERPL-MCNC: 4.2 G/DL (ref 3.5–5.2)
ALBUMIN/GLOB SERPL: 1.3 G/DL
ALP SERPL-CCNC: 81 U/L (ref 39–117)
ALT SERPL W P-5'-P-CCNC: 15 U/L (ref 1–33)
ANION GAP SERPL CALCULATED.3IONS-SCNC: 16.6 MMOL/L (ref 5–15)
AST SERPL-CCNC: 26 U/L (ref 1–32)
BASOPHILS # BLD AUTO: 0.06 10*3/MM3 (ref 0–0.2)
BASOPHILS NFR BLD AUTO: 0.7 % (ref 0–1.5)
BILIRUB SERPL-MCNC: 0.6 MG/DL (ref 0–1.2)
BUN SERPL-MCNC: 14 MG/DL (ref 8–23)
BUN/CREAT SERPL: 19.7 (ref 7–25)
CALCIUM SPEC-SCNC: 9.9 MG/DL (ref 8.6–10.5)
CHLORIDE SERPL-SCNC: 98 MMOL/L (ref 98–107)
CHOLEST SERPL-MCNC: 157 MG/DL (ref 0–200)
CO2 SERPL-SCNC: 23.4 MMOL/L (ref 22–29)
CREAT SERPL-MCNC: 0.71 MG/DL (ref 0.57–1)
DEPRECATED RDW RBC AUTO: 43.5 FL (ref 37–54)
EGFRCR SERPLBLD CKD-EPI 2021: 90.5 ML/MIN/1.73
EOSINOPHIL # BLD AUTO: 0.19 10*3/MM3 (ref 0–0.4)
EOSINOPHIL NFR BLD AUTO: 2.1 % (ref 0.3–6.2)
ERYTHROCYTE [DISTWIDTH] IN BLOOD BY AUTOMATED COUNT: 12.3 % (ref 12.3–15.4)
GLOBULIN UR ELPH-MCNC: 3.2 GM/DL
GLUCOSE SERPL-MCNC: 106 MG/DL (ref 65–99)
HCT VFR BLD AUTO: 36.6 % (ref 34–46.6)
HDLC SERPL-MCNC: 78 MG/DL (ref 40–60)
HGB BLD-MCNC: 11.7 G/DL (ref 12–15.9)
IMM GRANULOCYTES # BLD AUTO: 0.04 10*3/MM3 (ref 0–0.05)
IMM GRANULOCYTES NFR BLD AUTO: 0.4 % (ref 0–0.5)
LDLC SERPL CALC-MCNC: 66 MG/DL (ref 0–100)
LDLC/HDLC SERPL: 0.84 {RATIO}
LYMPHOCYTES # BLD AUTO: 2.8 10*3/MM3 (ref 0.7–3.1)
LYMPHOCYTES NFR BLD AUTO: 31.4 % (ref 19.6–45.3)
MCH RBC QN AUTO: 30.6 PG (ref 26.6–33)
MCHC RBC AUTO-ENTMCNC: 32 G/DL (ref 31.5–35.7)
MCV RBC AUTO: 95.8 FL (ref 79–97)
MONOCYTES # BLD AUTO: 0.68 10*3/MM3 (ref 0.1–0.9)
MONOCYTES NFR BLD AUTO: 7.6 % (ref 5–12)
NEUTROPHILS NFR BLD AUTO: 5.15 10*3/MM3 (ref 1.7–7)
NEUTROPHILS NFR BLD AUTO: 57.8 % (ref 42.7–76)
NRBC BLD AUTO-RTO: 0 /100 WBC (ref 0–0.2)
PLATELET # BLD AUTO: 254 10*3/MM3 (ref 140–450)
PMV BLD AUTO: 11.4 FL (ref 6–12)
POTASSIUM SERPL-SCNC: 4.7 MMOL/L (ref 3.5–5.2)
PROT SERPL-MCNC: 7.4 G/DL (ref 6–8.5)
RBC # BLD AUTO: 3.82 10*6/MM3 (ref 3.77–5.28)
SODIUM SERPL-SCNC: 138 MMOL/L (ref 136–145)
TRIGL SERPL-MCNC: 69 MG/DL (ref 0–150)
TSH SERPL DL<=0.05 MIU/L-ACNC: 1.98 UIU/ML (ref 0.27–4.2)
VLDLC SERPL-MCNC: 13 MG/DL (ref 5–40)
WBC NRBC COR # BLD AUTO: 8.92 10*3/MM3 (ref 3.4–10.8)

## 2025-01-20 PROCEDURE — 3074F SYST BP LT 130 MM HG: CPT | Performed by: HOSPITALIST

## 2025-01-20 PROCEDURE — 84443 ASSAY THYROID STIM HORMONE: CPT

## 2025-01-20 PROCEDURE — 80061 LIPID PANEL: CPT

## 2025-01-20 PROCEDURE — 99214 OFFICE O/P EST MOD 30 MIN: CPT | Performed by: HOSPITALIST

## 2025-01-20 PROCEDURE — 36415 COLL VENOUS BLD VENIPUNCTURE: CPT

## 2025-01-20 PROCEDURE — 1125F AMNT PAIN NOTED PAIN PRSNT: CPT | Performed by: HOSPITALIST

## 2025-01-20 PROCEDURE — 1159F MED LIST DOCD IN RCRD: CPT | Performed by: HOSPITALIST

## 2025-01-20 PROCEDURE — 85025 COMPLETE CBC W/AUTO DIFF WBC: CPT

## 2025-01-20 PROCEDURE — 83540 ASSAY OF IRON: CPT

## 2025-01-20 PROCEDURE — G2211 COMPLEX E/M VISIT ADD ON: HCPCS | Performed by: HOSPITALIST

## 2025-01-20 PROCEDURE — 1160F RVW MEDS BY RX/DR IN RCRD: CPT | Performed by: HOSPITALIST

## 2025-01-20 PROCEDURE — 84466 ASSAY OF TRANSFERRIN: CPT

## 2025-01-20 PROCEDURE — 80053 COMPREHEN METABOLIC PANEL: CPT

## 2025-01-20 PROCEDURE — 3079F DIAST BP 80-89 MM HG: CPT | Performed by: HOSPITALIST

## 2025-01-20 RX ORDER — LOSARTAN POTASSIUM 50 MG/1
50 TABLET ORAL DAILY
Qty: 90 TABLET | Refills: 2 | Status: SHIPPED | OUTPATIENT
Start: 2025-01-20

## 2025-01-20 RX ORDER — ATORVASTATIN CALCIUM 40 MG/1
40 TABLET, FILM COATED ORAL DAILY
Qty: 90 TABLET | Refills: 2 | Status: SHIPPED | OUTPATIENT
Start: 2025-01-20

## 2025-01-20 RX ORDER — HYDROCHLOROTHIAZIDE 25 MG/1
25 TABLET ORAL DAILY
Qty: 90 TABLET | Refills: 2 | Status: SHIPPED | OUTPATIENT
Start: 2025-01-20

## 2025-01-20 RX ORDER — OMEPRAZOLE 40 MG/1
40 CAPSULE, DELAYED RELEASE ORAL DAILY
Qty: 90 CAPSULE | Refills: 3 | Status: SHIPPED | OUTPATIENT
Start: 2025-01-20

## 2025-01-20 RX ORDER — NAPROXEN 500 MG/1
500 TABLET ORAL EVERY 12 HOURS PRN
Qty: 30 TABLET | Refills: 2 | Status: SHIPPED | OUTPATIENT
Start: 2025-01-20

## 2025-01-20 NOTE — ASSESSMENT & PLAN NOTE
Patient is following with physical therapy twice a week and states the Naprosyn is still helping her pain.  Refills given

## 2025-01-20 NOTE — ASSESSMENT & PLAN NOTE
patient's blood pressure is stable; continue losartan 50mg PO daily, HCTZ 25mg PO daily and amlodipine 5mg PO daily.  Refills given

## 2025-01-20 NOTE — PROGRESS NOTES
Follow Up Office Visit      Patient Name: Ana Alaniz  : 1955   MRN: [id number]     Chief Complaint:  Hypertension     History of Present Illness: Ana Alaniz is a 70 y.o. female who is here today for follow up on blood pressure medications.    HTN: patient's blood pressure is stable; continue losartan 50mg PO daily, HCTZ 25mg PO daily and amlodipine 5mg PO daily    GERD: stable on omeprazole 40mg PO daily    HLD: stable on Atorvastatin 40mg PO daily    Hip/back pain: patient is going to PT twice/week and is s/p injection and doing better. She states she still takes the Naprosyn for pain.        Subjective     Subjective          The following portions of the patient's history were reviewed and updated as appropriate: allergies, current medications, past family history, past medical history, past social history, past surgical history and problem list.    Allergy:   Allergies   Allergen Reactions    Tetanus Toxoids Other (See Comments)     Tested positive on skin testing    Ace Inhibitors Cough        Current Medications:   Current Outpatient Medications   Medication Sig Dispense Refill    amLODIPine (NORVASC) 5 MG tablet Take 1 tablet by mouth Daily. 90 tablet 2    atorvastatin (Lipitor) 40 MG tablet Take 1 tablet by mouth Daily. 90 tablet 2    Calcium Carb-Cholecalciferol 600-500 MG-UNIT capsule Take  by mouth.      fexofenadine (ALLEGRA) 180 MG tablet Take 1 tablet by mouth Daily. 90 tablet 3    Glucosamine-Chondroit-Vit C-Mn (GLUCOSAMINE CHONDR 1500 COMPLX) capsule Take 1,500 mg by mouth 2 (Two) Times a Day.      hydroCHLOROthiazide 25 MG tablet Take 1 tablet by mouth Daily. 90 tablet 2    losartan (COZAAR) 50 MG tablet Take 1 tablet by mouth Daily. 90 tablet 2    Lutein-Zeaxanthin 25-5 MG capsule Take 1 tablet by mouth Daily.      Multiple Vitamins-Minerals (PRESERVISION AREDS 2 PO) Take  by mouth Daily.      Multiple Vitamins-Minerals (PreserVision AREDS 2+Multi Vit) capsule       naproxen (EC  "NAPROSYN) 500 MG EC tablet Take 1 tablet by mouth Every 12 (Twelve) Hours As Needed for Mild Pain. 30 tablet 2    Omega-3 Fatty Acids (OMEGA-3 FISH OIL) 500 MG capsule Take 1 capsule by mouth Daily.      omeprazole (priLOSEC) 40 MG capsule Take 1 capsule by mouth Daily. 90 capsule 3     No current facility-administered medications for this visit.       Objective     Objective     Physical Exam:  Physical Exam  Vitals and nursing note reviewed.   Constitutional:       Appearance: Normal appearance.   HENT:      Head: Normocephalic and atraumatic.      Mouth/Throat:      Mouth: Mucous membranes are moist.   Eyes:      Pupils: Pupils are equal, round, and reactive to light.   Cardiovascular:      Rate and Rhythm: Normal rate and regular rhythm.      Heart sounds: Normal heart sounds.   Pulmonary:      Effort: Pulmonary effort is normal.      Breath sounds: Normal breath sounds.   Abdominal:      General: Bowel sounds are normal.      Palpations: Abdomen is soft.   Musculoskeletal:         General: Normal range of motion.      Cervical back: Normal range of motion.   Skin:     General: Skin is warm and dry.   Neurological:      General: No focal deficit present.      Mental Status: She is alert and oriented to person, place, and time.   Psychiatric:         Mood and Affect: Mood normal.         Behavior: Behavior normal.         Vital Signs:   /80 (BP Location: Left arm, Patient Position: Sitting, Cuff Size: Adult)   Pulse 75   Temp 97.4 °F (36.3 °C) (Temporal)   Resp 20   Ht 162.6 cm (64.02\")   Wt 77.3 kg (170 lb 6.4 oz)   SpO2 98%   BMI 29.23 kg/m²            PHQ-9 Score  PHQ-9 Total Score:      Lab Review  Admission on 11/05/2024, Discharged on 11/05/2024   Component Date Value Ref Range Status    Glucose 11/05/2024 122 (H)  65 - 99 mg/dL Final    BUN 11/05/2024 11  8 - 23 mg/dL Final    Creatinine 11/05/2024 0.57  0.57 - 1.00 mg/dL Final    Sodium 11/05/2024 142  136 - 145 mmol/L Final    Potassium " 11/05/2024 3.3 (L)  3.5 - 5.2 mmol/L Final    Chloride 11/05/2024 103  98 - 107 mmol/L Final    CO2 11/05/2024 23.3  22.0 - 29.0 mmol/L Final    Calcium 11/05/2024 9.9  8.6 - 10.5 mg/dL Final    Total Protein 11/05/2024 7.2  6.0 - 8.5 g/dL Final    Albumin 11/05/2024 4.6  3.5 - 5.2 g/dL Final    ALT (SGPT) 11/05/2024 14  1 - 33 U/L Final    AST (SGOT) 11/05/2024 30  1 - 32 U/L Final    Alkaline Phosphatase 11/05/2024 66  39 - 117 U/L Final    Total Bilirubin 11/05/2024 0.6  0.0 - 1.2 mg/dL Final    Globulin 11/05/2024 2.6  gm/dL Final    A/G Ratio 11/05/2024 1.8  g/dL Final    BUN/Creatinine Ratio 11/05/2024 19.3  7.0 - 25.0 Final    Anion Gap 11/05/2024 15.7 (H)  5.0 - 15.0 mmol/L Final    eGFR 11/05/2024 97.3  >60.0 mL/min/1.73 Final    WBC 11/05/2024 6.86  3.40 - 10.80 10*3/mm3 Final    RBC 11/05/2024 3.96  3.77 - 5.28 10*6/mm3 Final    Hemoglobin 11/05/2024 12.7  12.0 - 15.9 g/dL Final    Hematocrit 11/05/2024 38.6  34.0 - 46.6 % Final    MCV 11/05/2024 97.5 (H)  79.0 - 97.0 fL Final    MCH 11/05/2024 32.1  26.6 - 33.0 pg Final    MCHC 11/05/2024 32.9  31.5 - 35.7 g/dL Final    RDW 11/05/2024 13.4  12.3 - 15.4 % Final    RDW-SD 11/05/2024 48.7  37.0 - 54.0 fl Final    MPV 11/05/2024 10.6  6.0 - 12.0 fL Final    Platelets 11/05/2024 200  140 - 450 10*3/mm3 Final    Neutrophil % 11/05/2024 70.9  42.7 - 76.0 % Final    Lymphocyte % 11/05/2024 20.4  19.6 - 45.3 % Final    Monocyte % 11/05/2024 7.4  5.0 - 12.0 % Final    Eosinophil % 11/05/2024 0.6  0.3 - 6.2 % Final    Basophil % 11/05/2024 0.6  0.0 - 1.5 % Final    Immature Grans % 11/05/2024 0.1  0.0 - 0.5 % Final    Neutrophils, Absolute 11/05/2024 4.86  1.70 - 7.00 10*3/mm3 Final    Lymphocytes, Absolute 11/05/2024 1.40  0.70 - 3.10 10*3/mm3 Final    Monocytes, Absolute 11/05/2024 0.51  0.10 - 0.90 10*3/mm3 Final    Eosinophils, Absolute 11/05/2024 0.04  0.00 - 0.40 10*3/mm3 Final    Basophils, Absolute 11/05/2024 0.04  0.00 - 0.20 10*3/mm3 Final    Immature  Grans, Absolute 11/05/2024 0.01  0.00 - 0.05 10*3/mm3 Final        Radiology Results        Assessment / Plan         Assessment and Plan   Diagnoses and all orders for this visit:    1. Mixed hyperlipidemia  Assessment & Plan:  Stable on atorvastatin 40 mg.  New lipid panel ordered today     Orders:  -     atorvastatin (Lipitor) 40 MG tablet; Take 1 tablet by mouth Daily.  Dispense: 90 tablet; Refill: 2  -     Lipid panel; Future    2. Essential hypertension  Assessment & Plan:  patient's blood pressure is stable; continue losartan 50mg PO daily, HCTZ 25mg PO daily and amlodipine 5mg PO daily.  Refills given    Orders:  -     hydroCHLOROthiazide 25 MG tablet; Take 1 tablet by mouth Daily.  Dispense: 90 tablet; Refill: 2  -     losartan (COZAAR) 50 MG tablet; Take 1 tablet by mouth Daily.  Dispense: 90 tablet; Refill: 2  -     CBC & Differential; Future  -     Comprehensive metabolic panel; Future  -     TSH Rfx On Abnormal To Free T4; Future    3. Gastroesophageal reflux disease without esophagitis  Assessment & Plan:  Stable on Prilosec, refills given    Orders:  -     omeprazole (priLOSEC) 40 MG capsule; Take 1 capsule by mouth Daily.  Dispense: 90 capsule; Refill: 3    4. Left hip pain  Assessment & Plan:  Patient is following with physical therapy twice a week and states the Naprosyn is still helping her pain.  Refills given    Orders:  -     naproxen (EC NAPROSYN) 500 MG EC tablet; Take 1 tablet by mouth Every 12 (Twelve) Hours As Needed for Mild Pain.  Dispense: 30 tablet; Refill: 2    5. Lumbar spondylolysis  -     naproxen (EC NAPROSYN) 500 MG EC tablet; Take 1 tablet by mouth Every 12 (Twelve) Hours As Needed for Mild Pain.  Dispense: 30 tablet; Refill: 2                Health Maintenance  Health Maintenance:   Health Maintenance Due   Topic Date Due    LIPID PANEL  08/29/2024    ANNUAL WELLNESS VISIT  03/08/2025        Meds ordered during this visit  New Medications Ordered This Visit   Medications     atorvastatin (Lipitor) 40 MG tablet     Sig: Take 1 tablet by mouth Daily.     Dispense:  90 tablet     Refill:  2    hydroCHLOROthiazide 25 MG tablet     Sig: Take 1 tablet by mouth Daily.     Dispense:  90 tablet     Refill:  2    losartan (COZAAR) 50 MG tablet     Sig: Take 1 tablet by mouth Daily.     Dispense:  90 tablet     Refill:  2    omeprazole (priLOSEC) 40 MG capsule     Sig: Take 1 capsule by mouth Daily.     Dispense:  90 capsule     Refill:  3    naproxen (EC NAPROSYN) 500 MG EC tablet     Sig: Take 1 tablet by mouth Every 12 (Twelve) Hours As Needed for Mild Pain.     Dispense:  30 tablet     Refill:  2       Meds stopped during this visit:  Medications Discontinued During This Encounter   Medication Reason    omeprazole (priLOSEC) 40 MG capsule Reorder    atorvastatin (Lipitor) 40 MG tablet Reorder    losartan (COZAAR) 50 MG tablet Reorder    hydroCHLOROthiazide 25 MG tablet Reorder    naproxen (EC NAPROSYN) 500 MG EC tablet Reorder        Patient was given instructions and counseling regarding her condition or for health maintenance advice. Please see specific information pulled into the AVS if appropriate.     Follow Up   Return in about 6 months (around 7/20/2025) for Annual physical.    Brianna Nava DO  [facility] [place]     Dictated Utilizing Dragon Dictation: Part of this note may be an electronic transcription/translation of spoken language to printed text using the Dragon Dictation System.    This document has been electronically signed by Brianna Nava DO   January 20, 2025 11:18 EST

## 2025-01-22 DIAGNOSIS — D64.9 ANEMIA, UNSPECIFIED TYPE: Primary | ICD-10-CM

## 2025-01-22 LAB
IRON 24H UR-MRATE: 81 MCG/DL (ref 37–145)
IRON SATN MFR SERPL: 28 % (ref 20–50)
TIBC SERPL-MCNC: 292 MCG/DL (ref 298–536)
TRANSFERRIN SERPL-MCNC: 196 MG/DL (ref 200–360)

## 2025-04-29 ENCOUNTER — OFFICE VISIT (OUTPATIENT)
Dept: PAIN MEDICINE | Facility: CLINIC | Age: 70
End: 2025-04-29
Payer: MEDICARE

## 2025-04-29 VITALS — HEIGHT: 64 IN | WEIGHT: 167 LBS | BODY MASS INDEX: 28.51 KG/M2

## 2025-04-29 DIAGNOSIS — G89.29 CHRONIC LOW BACK PAIN, UNSPECIFIED BACK PAIN LATERALITY, UNSPECIFIED WHETHER SCIATICA PRESENT: ICD-10-CM

## 2025-04-29 DIAGNOSIS — M54.16 LUMBAR RADICULOPATHY: Primary | ICD-10-CM

## 2025-04-29 DIAGNOSIS — Z51.81 THERAPEUTIC DRUG MONITORING: ICD-10-CM

## 2025-04-29 DIAGNOSIS — M54.50 CHRONIC LOW BACK PAIN, UNSPECIFIED BACK PAIN LATERALITY, UNSPECIFIED WHETHER SCIATICA PRESENT: ICD-10-CM

## 2025-04-29 NOTE — TELEPHONE ENCOUNTER
Restart gabapentin  Gabapentin 100 mg twice daily, 60 pills, #2 refills  [place] reviewed and compliant  Pending UDS  Controlled substance agreement signed in office  Appropriate follow-up visit scheduled

## 2025-04-29 NOTE — PROGRESS NOTES
Referring Physician: No referring provider defined for this encounter.    Primary Physician: Brianna Nava DO    CHIEF COMPLAINT or REASON FOR VISIT: Follow-up      Initial history of present illness on 12/09/2024:  Ms. Ana Alaniz is 70 y.o. female who presents as a new patient referral for evaluation and treatment of left-sided low back pain with radiation to left lower extremity and ankle.  Pain started in very early November without any specific inciting event or trauma.  She woke up with a new onset left-sided radicular type pain.  She does have numbness tingling and burning pain to the lateral aspect of her calf originating from her lumbar spine.  She had intramuscular and ultimately oral steroids which did ameliorate her symptoms.  She is using her walker at this time.  She has not yet started physical therapy.  She has had significant ongoing pain poorly responsive to acetaminophen, oxycodone.  Patient denies any bowel or bladder dysfunction, lower extremity weakness, new onset saddle anesthesia or unexplained weight loss.   She denies any history of spinal surgery or intervention.    Interval history:  Patient returns to clinic today.  She continues to complain of left lower extremity numbness and tingling especially in the areas from her knee to her ankle.  She continues to report good relief from her injection that took place back in December.  No new injuries or event.  She did previously take gabapentin which she believes helped alleviate some of the numbness and tingling that she was experiencing.  She did tolerate this medication without side effect.  We did discuss potential neurosurgical referral however she is not interested in surgery at the moment as she does not believe her symptoms are severe enough to warrant surgery.  She states the pain is tolerable but ongoing.  Patient denies any bowel or bladder dysfunction, lower extremity weakness, new onset saddle anesthesia or unexplained  weight loss    Interventions:  12/17/2024: Left L4/5 TFESI with 80% relief ongoing    Objective Pain Scoring:   BRIEF PAIN INVENTORY:  Total score:   Pain Score    04/29/25 1057   PainSc: 3    PainLoc: Leg        PHQ-2: 1  PHQ-9:    Opioid Risk Tool:         Review of Systems:   ROS negative except as otherwise noted     Past Medical History:   Past Medical History:   Diagnosis Date    Acute pharyngitis     Allergic 50 years    Various    Allergy     Arrhythmia     Degenerative arthritis     Degenerative joint disease 11/08/2016    Epistaxis     Glaucoma     Hypertension     Joint pain     Many  years ago    Low back pain     Years ago    Osteopenia of neck of left femur 05/21/2022 5/22 DEXA scan: start calcium 600 mg daily and 800 IU vitamin D3.  Increase weight bearing exercises.  Follow up in 2-3 years for recheck.     Positive KENY (antinuclear antibody)     Right foot pain     Sinusitis     Sleep apnea     Transfusion history     URI (upper respiratory infection)          Past Surgical History:   Past Surgical History:   Procedure Laterality Date    APPENDECTOMY      CATARACT EXTRACTION, BILATERAL      EPIDURAL BLOCK      Birth of kids    JOINT REPLACEMENT  2013, 2010    Knees    OTHER SURGICAL HISTORY      LT Knee scar excision    REPLACEMENT TOTAL KNEE Right     TOTAL KNEE ARTHROPLASTY Left          Family History   Family History   Problem Relation Age of Onset    Heart disease Mother     Hypertension Mother     Kidney disease Mother         Took large amounts of aspirin    Clotting disorder Mother     Heart attack Mother     Hypertension Father     Stroke Father     Coronary artery disease Father     Clotting disorder Father     Hypertension Brother     Heart disease Brother         Congestive heart failure    Stroke Brother     Heart disease Brother     No Known Problems Brother     No Known Problems Brother     Cancer Maternal Grandmother         Grandmother    No Known Problems Paternal Grandmother      No Known Problems Maternal Grandfather     No Known Problems Paternal Grandfather     Cancer Other     Breast cancer Neg Hx     Ovarian cancer Neg Hx          Social History   Social History     Socioeconomic History    Marital status:    Tobacco Use    Smoking status: Never     Passive exposure: Never    Smokeless tobacco: Never   Vaping Use    Vaping status: Never Used    Passive vaping exposure: Yes   Substance and Sexual Activity    Alcohol use: Not Currently     Alcohol/week: 1.0 - 2.0 standard drink of alcohol     Types: 1 - 2 Glasses of wine per week     Comment: nightly    Drug use: No    Sexual activity: Defer        Medications:     Current Outpatient Medications:     amLODIPine (NORVASC) 5 MG tablet, Take 1 tablet by mouth Daily., Disp: 90 tablet, Rfl: 2    atorvastatin (Lipitor) 40 MG tablet, Take 1 tablet by mouth Daily., Disp: 90 tablet, Rfl: 2    Calcium Carb-Cholecalciferol 600-500 MG-UNIT capsule, Take  by mouth., Disp: , Rfl:     fexofenadine (ALLEGRA) 180 MG tablet, Take 1 tablet by mouth Daily., Disp: 90 tablet, Rfl: 3    Glucosamine-Chondroit-Vit C-Mn (GLUCOSAMINE CHONDR 1500 COMPLX) capsule, Take 1,500 mg by mouth 2 (Two) Times a Day., Disp: , Rfl:     hydroCHLOROthiazide 25 MG tablet, Take 1 tablet by mouth Daily., Disp: 90 tablet, Rfl: 2    losartan (COZAAR) 50 MG tablet, Take 1 tablet by mouth Daily., Disp: 90 tablet, Rfl: 2    Lutein-Zeaxanthin 25-5 MG capsule, Take 1 tablet by mouth Daily., Disp: , Rfl:     Multiple Vitamins-Minerals (PRESERVISION AREDS 2 PO), Take  by mouth Daily., Disp: , Rfl:     Multiple Vitamins-Minerals (PreserVision AREDS 2+Multi Vit) capsule, , Disp: , Rfl:     naproxen (EC NAPROSYN) 500 MG EC tablet, Take 1 tablet by mouth Every 12 (Twelve) Hours As Needed for Mild Pain., Disp: 30 tablet, Rfl: 2    Omega-3 Fatty Acids (OMEGA-3 FISH OIL) 500 MG capsule, Take 1 capsule by mouth Daily., Disp: , Rfl:     omeprazole (priLOSEC) 40 MG capsule, Take 1 capsule by  "mouth Daily., Disp: 90 capsule, Rfl: 3        Physical Exam:     Vitals:    04/29/25 1057   Weight: 75.8 kg (167 lb)   Height: 162.6 cm (64.02\")   PainSc: 3    PainLoc: Leg        General: Alert and oriented, No acute distress.   HEENT: Normocephalic, atraumatic.   Cardiovascular: No gross edema  Respiratory: Respirations are non-labored    Thoracic Spine:   Inspection: no gross abnormality  Paraspinal muscle palpation: nontender  Spinous process palpation: nontender    Lumbar Spine:   No masses or atrophy  Range of motion - Flexion normal. Extension reduced.    Facet Loading: Negative bilaterally  Facet Palpation - Nontender   Pawel finger/Gaenslen's/Scottie's/STACIE/Thigh thrust -   Straight leg raise/slump test: Positive left  Multifidus toe-touch test:    Motor Exam:      Strength: Rate on 1-5 scale Right Left    L1/2- hip flexion 5/5  5/5    L3- knee extension 5/5  5/5    L4- ankle dorsiflexion 5/5  5/5    L5- great toe extension 5/5  5/5    S1- ankle plantarflexion 5/5  5/5    Sensory Exam: Altered sensation left L4 dermatome      Neurologic: Cranial Nerves II-XII are grossly intact.     Psychiatric: Cooperative.   Gait: Antalgic flexed forward  Assistive Devices: Walker    Imaging Studies:   No results found for this or any previous visit.        Independent review of radiographic imaging: Available for my interpretation is lumbar MRI dated November 20, 2024 demonstrating left-sided L4/L5 disc bulge causing neuroforaminal stenosis; there is facet arthropathy; there is multifidus and lumbar paraspinal muscle atrophy.    Impression & Plan:       12/09/2024: Ana Alainz is a 70 y.o. female with past medical history significant for LUKASZ, HTN, GERD who presents to the pain clinic for evaluation and treatment of chronic low back pain the patient left lower extremity.  MRI interpretation as above.  Evaluation consistent with lumbar radiculopathy.  We discussed epidural steroid injection to improve pain.  If " greater than 50% relief for at least 2-3 months can consider repeat as needed every 3 to 4 months.  I had a discussion with the patient regarding the risks of the procedure including bleeding, infection, damage to surrounding structures.  We discussed the potential adverse effects of corticosteroid injection including flushing of the face, lipodystrophy, skin discoloration, elevated blood glucose, increased blood pressure.  Risks of frequent steroid administration include weight gain, hormonal changes, mood changes, osteoporosis.  Additionally will start gabapentin.  1/15/2025: Discontinue gabapentin.  Good relief from TFESI.  Can consider repeat if needed.  4/29/2025: Good relief from TFESI ongoing.  Will restart gabapentin.  Patient 5 declines NSA referral.    1. Lumbar radiculopathy    2. Chronic low back pain, unspecified back pain laterality, unspecified whether sciatica present    3. Therapeutic drug monitoring            PLAN:  1. Medication Recommendations: Recommend Voltaren topical, NSAIDs, Tylenol.  Can trial turmeric 500 mg twice daily if NSAID contraindicated.  Restart gabapentin  - Gabapentin 100 mg twice daily, 60 pills, #2 refills  As part of this patient's treatment plan, patient will be prescribed controlled substances.  The patient has been made aware of appropriate use of such medications, including potential risk of somnolent, limited ability to drive and/or work safely, and potential for dependence or overdose.  He has been made clear that his medications refused by this patient only, without concomitant use of alcohol or other substances as prescribed.  Controlled substance status of medication discussed with patient, discussed risk of medication including abuse potential and diversion potential and need to follow-up for reevaluation appointment in order to receive further refills.  [place] was reviewed and compliant.     2. Physical Therapy: Continue PT/HEP    3. Psychological: defer    4.  Complementary and alternative (CAM) Therapies:     5. Labs/Diagnostic studies: Obtain compliance UDS    6. Imagin. Interventions: Can consider repeat left L4/L5 transforaminal epidural steroid injection (56145).  - May consider interlaminar approach.     8. Referrals: Patient applied, neurosurgical referral    9. Records: [place] reviewed and compliant    10. Lifestyle goals:    Follow-up 3 months      [facility]  Tammy Blanchard PA-C        Quality Metrics:

## 2025-04-30 RX ORDER — GABAPENTIN 100 MG/1
100 CAPSULE ORAL 2 TIMES DAILY
Qty: 60 CAPSULE | Refills: 2 | Status: SHIPPED | OUTPATIENT
Start: 2025-04-30

## 2025-05-30 ENCOUNTER — TELEPHONE (OUTPATIENT)
Dept: PAIN MEDICINE | Facility: CLINIC | Age: 70
End: 2025-05-30
Payer: MEDICARE

## 2025-05-30 NOTE — TELEPHONE ENCOUNTER
[facility] [employer]    LVM to reschedule appt 7/29 for 3 month follow up/medication management. Provider moving to [place] location on Tues/Thurs. Please reschedule.

## 2025-06-25 ENCOUNTER — LAB (OUTPATIENT)
Dept: LAB | Facility: HOSPITAL | Age: 70
End: 2025-06-25
Payer: MEDICARE

## 2025-06-25 DIAGNOSIS — Z51.81 THERAPEUTIC DRUG MONITORING: ICD-10-CM

## 2025-06-25 LAB
AMPHET+METHAMPHET UR QL: NEGATIVE
AMPHETAMINES UR QL: NEGATIVE
BARBITURATES UR QL SCN: NEGATIVE
BENZODIAZ UR QL SCN: NEGATIVE
BUPRENORPHINE SERPL-MCNC: NEGATIVE NG/ML
CANNABINOIDS SERPL QL: NEGATIVE
COCAINE UR QL: NEGATIVE
FENTANYL UR-MCNC: NEGATIVE NG/ML
METHADONE UR QL SCN: NEGATIVE
OPIATES UR QL: NEGATIVE
OXYCODONE UR QL SCN: NEGATIVE
PCP UR QL SCN: NEGATIVE
TRICYCLICS UR QL SCN: NEGATIVE

## 2025-06-25 PROCEDURE — 80307 DRUG TEST PRSMV CHEM ANLYZR: CPT

## 2025-07-08 ENCOUNTER — OFFICE VISIT (OUTPATIENT)
Dept: SLEEP MEDICINE | Facility: CLINIC | Age: 70
End: 2025-07-08
Payer: MEDICARE

## 2025-07-08 VITALS
BODY MASS INDEX: 28.51 KG/M2 | OXYGEN SATURATION: 90 % | HEIGHT: 64 IN | WEIGHT: 167 LBS | DIASTOLIC BLOOD PRESSURE: 72 MMHG | HEART RATE: 76 BPM | TEMPERATURE: 98.2 F | SYSTOLIC BLOOD PRESSURE: 126 MMHG

## 2025-07-08 DIAGNOSIS — G47.33 OSA (OBSTRUCTIVE SLEEP APNEA): Primary | ICD-10-CM

## 2025-07-08 PROCEDURE — 99213 OFFICE O/P EST LOW 20 MIN: CPT | Performed by: NURSE PRACTITIONER

## 2025-07-08 PROCEDURE — 3074F SYST BP LT 130 MM HG: CPT | Performed by: NURSE PRACTITIONER

## 2025-07-08 PROCEDURE — 3078F DIAST BP <80 MM HG: CPT | Performed by: NURSE PRACTITIONER

## 2025-07-08 NOTE — PROGRESS NOTES
Chief Complaint:   Chief Complaint   Patient presents with    Follow-up    Sleep Apnea       HPI:    Ana Alaniz is a 70 y.o. female here for follow-up of.  Patient continues to do well with CPAP therapy.  Patient is sleeping 7 hours nightly.  Patient goes to sleep within 20 to 30 minutes and will get up 1-2 times in the night.  Patient has an Coventry score of 0/24.  Patient is doing well without concern or complaint and will continue therapy.        Current medications are:   Current Outpatient Medications:     amLODIPine (NORVASC) 5 MG tablet, Take 1 tablet by mouth Daily., Disp: 90 tablet, Rfl: 2    atorvastatin (Lipitor) 40 MG tablet, Take 1 tablet by mouth Daily., Disp: 90 tablet, Rfl: 2    Calcium Carb-Cholecalciferol 600-500 MG-UNIT capsule, Take  by mouth., Disp: , Rfl:     fexofenadine (ALLEGRA) 180 MG tablet, Take 1 tablet by mouth Daily., Disp: 90 tablet, Rfl: 3    gabapentin (NEURONTIN) 100 MG capsule, Take 1 capsule by mouth 2 (Two) Times a Day., Disp: 60 capsule, Rfl: 2    Glucosamine-Chondroit-Vit C-Mn (GLUCOSAMINE CHONDR 1500 COMPLX) capsule, Take 1,500 mg by mouth 2 (Two) Times a Day., Disp: , Rfl:     hydroCHLOROthiazide 25 MG tablet, Take 1 tablet by mouth Daily., Disp: 90 tablet, Rfl: 2    losartan (COZAAR) 50 MG tablet, Take 1 tablet by mouth Daily., Disp: 90 tablet, Rfl: 2    Lutein-Zeaxanthin 25-5 MG capsule, Take 1 tablet by mouth Daily., Disp: , Rfl:     Multiple Vitamins-Minerals (PRESERVISION AREDS 2 PO), Take  by mouth Daily., Disp: , Rfl:     Multiple Vitamins-Minerals (PreserVision AREDS 2+Multi Vit) capsule, , Disp: , Rfl:     naproxen (EC NAPROSYN) 500 MG EC tablet, Take 1 tablet by mouth Every 12 (Twelve) Hours As Needed for Mild Pain., Disp: 30 tablet, Rfl: 2    Omega-3 Fatty Acids (OMEGA-3 FISH OIL) 500 MG capsule, Take 1 capsule by mouth Daily., Disp: , Rfl:     omeprazole (priLOSEC) 40 MG capsule, Take 1 capsule by mouth Daily., Disp: 90 capsule, Rfl: 3.      The patient's  "relevant past medical, surgical, family and social history were reviewed and updated in Epic as appropriate.       Review of Systems   HENT:  Positive for rhinorrhea.    Eyes:  Positive for visual disturbance.   Respiratory:  Positive for apnea.    Cardiovascular:  Positive for palpitations.   Gastrointestinal:         Heartburn   Musculoskeletal:  Positive for arthralgias and back pain.   Allergic/Immunologic: Positive for environmental allergies.   Psychiatric/Behavioral:  Positive for sleep disturbance.    All other systems reviewed and are negative.        Objective:    Physical Exam  Constitutional:       Appearance: Normal appearance.   HENT:      Head: Normocephalic and atraumatic.      Mouth/Throat:      Comments: Class 1 airway  Cardiovascular:      Rate and Rhythm: Normal rate and regular rhythm.   Pulmonary:      Effort: Pulmonary effort is normal.      Breath sounds: Normal breath sounds.   Skin:     General: Skin is warm and dry.   Neurological:      Mental Status: She is alert and oriented to person, place, and time.   Psychiatric:         Mood and Affect: Mood normal.         Behavior: Behavior normal.         Thought Content: Thought content normal.         Judgment: Judgment normal.       /72   Pulse 76   Temp 98.2 °F (36.8 °C)   Ht 162.6 cm (64.02\")   Wt 75.8 kg (167 lb)   SpO2 90%   BMI 28.65 kg/m²     CPAP Report  76/90 days of use  Greater than 4-hour use 77.8  90% pressure 10.0  AHI 1.5  Setting 8-18    The patient continues to use and benefit from CPAP therapy.    ASSESSMENT/PLAN    Diagnoses and all orders for this visit:    1. LUKASZ (obstructive sleep apnea) (Primary)  -     PAP Therapy        Counseled patient regarding multimodal approach with healthy nutrition, healthy sleep, regular physical activity, social activities, counseling, and medications. Encouraged to practice lateral sleep position. Avoid alcohol and sedatives close to bedtime.      Refill supplies x 1 year.  Return " to clinic 1 year or sooner as symptoms warrant.  Signed by  Dia Ramey, APRN    July 8, 2025      CC: Brianna Nava, DO         No ref. provider found

## 2025-07-21 ENCOUNTER — OFFICE VISIT (OUTPATIENT)
Dept: FAMILY MEDICINE CLINIC | Facility: CLINIC | Age: 70
End: 2025-07-21
Payer: MEDICARE

## 2025-07-21 VITALS
HEIGHT: 64 IN | TEMPERATURE: 97.8 F | BODY MASS INDEX: 28.85 KG/M2 | HEART RATE: 68 BPM | RESPIRATION RATE: 20 BRPM | OXYGEN SATURATION: 98 % | WEIGHT: 169 LBS | SYSTOLIC BLOOD PRESSURE: 124 MMHG | DIASTOLIC BLOOD PRESSURE: 84 MMHG

## 2025-07-21 DIAGNOSIS — Z00.00 MEDICARE ANNUAL WELLNESS VISIT, SUBSEQUENT: Primary | ICD-10-CM

## 2025-07-21 PROCEDURE — G0439 PPPS, SUBSEQ VISIT: HCPCS | Performed by: HOSPITALIST

## 2025-07-21 PROCEDURE — 1125F AMNT PAIN NOTED PAIN PRSNT: CPT | Performed by: HOSPITALIST

## 2025-07-21 PROCEDURE — 3079F DIAST BP 80-89 MM HG: CPT | Performed by: HOSPITALIST

## 2025-07-21 PROCEDURE — 1160F RVW MEDS BY RX/DR IN RCRD: CPT | Performed by: HOSPITALIST

## 2025-07-21 PROCEDURE — 1159F MED LIST DOCD IN RCRD: CPT | Performed by: HOSPITALIST

## 2025-07-21 PROCEDURE — 3074F SYST BP LT 130 MM HG: CPT | Performed by: HOSPITALIST

## 2025-07-21 PROCEDURE — 1170F FXNL STATUS ASSESSED: CPT | Performed by: HOSPITALIST

## 2025-07-30 ENCOUNTER — OFFICE VISIT (OUTPATIENT)
Dept: PAIN MEDICINE | Facility: CLINIC | Age: 70
End: 2025-07-30
Payer: MEDICARE

## 2025-07-30 ENCOUNTER — HOSPITAL ENCOUNTER (OUTPATIENT)
Dept: GENERAL RADIOLOGY | Facility: HOSPITAL | Age: 70
Discharge: HOME | End: 2025-07-30
Payer: MEDICARE

## 2025-07-30 VITALS — WEIGHT: 163 LBS | HEIGHT: 64 IN | BODY MASS INDEX: 27.83 KG/M2

## 2025-07-30 DIAGNOSIS — M47.817 LUMBOSACRAL SPONDYLOSIS WITHOUT MYELOPATHY: ICD-10-CM

## 2025-07-30 DIAGNOSIS — M54.50 ACUTE LOW BACK PAIN WITHOUT SCIATICA, UNSPECIFIED BACK PAIN LATERALITY: ICD-10-CM

## 2025-07-30 DIAGNOSIS — M54.50 CHRONIC LOW BACK PAIN, UNSPECIFIED BACK PAIN LATERALITY, UNSPECIFIED WHETHER SCIATICA PRESENT: Primary | ICD-10-CM

## 2025-07-30 DIAGNOSIS — G89.29 CHRONIC LOW BACK PAIN, UNSPECIFIED BACK PAIN LATERALITY, UNSPECIFIED WHETHER SCIATICA PRESENT: Primary | ICD-10-CM

## 2025-07-30 DIAGNOSIS — M54.16 LUMBAR RADICULOPATHY: ICD-10-CM

## 2025-07-30 DIAGNOSIS — G89.4 CHRONIC PAIN SYNDROME: ICD-10-CM

## 2025-07-30 PROCEDURE — 72114 X-RAY EXAM L-S SPINE BENDING: CPT

## 2025-07-30 RX ORDER — NAPROXEN 500 MG/1
TABLET ORAL
COMMUNITY
Start: 2025-07-28

## 2025-07-30 RX ORDER — BACLOFEN 10 MG/1
TABLET ORAL
Qty: 45 TABLET | Refills: 0 | Status: SHIPPED | OUTPATIENT
Start: 2025-07-30

## 2025-07-30 RX ORDER — METHYLPREDNISOLONE 4 MG/1
TABLET ORAL
Qty: 21 TABLET | Refills: 0 | Status: SHIPPED | OUTPATIENT
Start: 2025-07-30

## 2025-07-30 RX ORDER — GABAPENTIN 100 MG/1
CAPSULE ORAL
COMMUNITY
Start: 2025-07-28

## 2025-07-30 NOTE — PROGRESS NOTES
Referring Physician: No referring provider defined for this encounter.    Primary Physician: Brianna Nava DO    CHIEF COMPLAINT or REASON FOR VISIT: Back Pain and Follow-up      Initial history of present illness on 12/09/2024:  Ms. Ana Alaniz is 70 y.o. female who presents as a new patient referral for evaluation and treatment of left-sided low back pain with radiation to left lower extremity and ankle.  Pain started in very early November without any specific inciting event or trauma.  She woke up with a new onset left-sided radicular type pain.  She does have numbness tingling and burning pain to the lateral aspect of her calf originating from her lumbar spine.  She had intramuscular and ultimately oral steroids which did ameliorate her symptoms.  She is using her walker at this time.  She has not yet started physical therapy.  She has had significant ongoing pain poorly responsive to acetaminophen, oxycodone.  Patient denies any bowel or bladder dysfunction, lower extremity weakness, new onset saddle anesthesia or unexplained weight loss.   She denies any history of spinal surgery or intervention.    Interval history:  Patient returns to clinic today.  She continues to have good pain relief of her left radicular pain following an injection that took place in December 2024.  Her leg pain remains a 1 or 2.  She is taking gabapentin 100 mg twice daily but has not noticed any significant pain relief from this medication.  She has not interested in up titration and prefers to avoid this medication if possible.  She has always had some degree of low back pain, left greater than right.  She had a significant exacerbation in her symptoms on Friday, 7/25/2025, without any inciting injury or event.  Primarily complains of axial back pain.  Denies any radiation into the lower extremities.  Denies any injuries or events such as falls that led to an increase in pain.  She has been using lidocaine patches, naproxen,  and Tylenol without significant pain relief.  Pain can be alleviated with laying flat.  She denies any recent injuries or changes to her bowel or bladder, saddle anesthesia, or lower extremity weakness.  Denies any chest pain or shortness of breath.  She denies any history of compression fractures.  Denies any recent illness, fever, chills, malaise      Interventions:  12/17/2024: Left L4/5 TFESI with 80% relief ongoing    Objective Pain Scoring:   BRIEF PAIN INVENTORY:  Total score:   Pain Score    07/30/25 1105   PainSc: 7    PainLoc: Back        PHQ-2: 4  PHQ-9: 6  Opioid Risk Tool:         Review of Systems:   ROS negative except as otherwise noted     Past Medical History:   Past Medical History:   Diagnosis Date    Acute pharyngitis     Allergic 50 years    Various    Allergy     Arrhythmia     Cataract     Had surgery    Degenerative arthritis     Degenerative joint disease 11/08/2016    Epistaxis     Glaucoma     Hypertension     Joint pain     Many  years ago    Low back pain     Years ago    Osteopenia of neck of left femur 05/21/2022 5/22 DEXA scan: start calcium 600 mg daily and 800 IU vitamin D3.  Increase weight bearing exercises.  Follow up in 2-3 years for recheck.     Positive KENY (antinuclear antibody)     Right foot pain     Sinusitis     Sleep apnea     Transfusion history     URI (upper respiratory infection)          Past Surgical History:   Past Surgical History:   Procedure Laterality Date    APPENDECTOMY      CATARACT EXTRACTION, BILATERAL      EPIDURAL BLOCK      Birth of kids    JOINT REPLACEMENT  2013, 2010    Knees    OTHER SURGICAL HISTORY      LT Knee scar excision    REPLACEMENT TOTAL KNEE Right     TOTAL KNEE ARTHROPLASTY Left          Family History   Family History   Problem Relation Age of Onset    Heart disease Mother     Hypertension Mother     Kidney disease Mother         Took large amounts of aspirin    Clotting disorder Mother     Heart attack Mother     Hypertension  Father     Stroke Father     Coronary artery disease Father     Clotting disorder Father     Hypertension Brother     Heart disease Brother         Congestive heart failure    Stroke Brother     Heart disease Brother     No Known Problems Brother     No Known Problems Brother     Cancer Maternal Grandmother         Grandmother    No Known Problems Paternal Grandmother     No Known Problems Maternal Grandfather     No Known Problems Paternal Grandfather     Cancer Other     Breast cancer Neg Hx     Ovarian cancer Neg Hx          Social History   Social History     Socioeconomic History    Marital status:    Tobacco Use    Smoking status: Never     Passive exposure: Never    Smokeless tobacco: Never   Vaping Use    Vaping status: Never Used    Passive vaping exposure: Yes   Substance and Sexual Activity    Alcohol use: Not Currently     Alcohol/week: 1.0 - 2.0 standard drink of alcohol     Types: 1 - 2 Glasses of wine per week     Comment: nightly    Drug use: No    Sexual activity: Defer        Medications:     Current Outpatient Medications:     amLODIPine (NORVASC) 5 MG tablet, Take 1 tablet by mouth Daily., Disp: 90 tablet, Rfl: 2    atorvastatin (Lipitor) 40 MG tablet, Take 1 tablet by mouth Daily., Disp: 90 tablet, Rfl: 2    Calcium Carb-Cholecalciferol 600-500 MG-UNIT capsule, Take  by mouth., Disp: , Rfl:     fexofenadine (ALLEGRA) 180 MG tablet, Take 1 tablet by mouth Daily., Disp: 90 tablet, Rfl: 3    gabapentin (NEURONTIN) 100 MG capsule, , Disp: , Rfl:     Glucosamine-Chondroit-Vit C-Mn (GLUCOSAMINE CHONDR 1500 COMPLX) capsule, Take 1,500 mg by mouth 2 (Two) Times a Day., Disp: , Rfl:     hydroCHLOROthiazide 25 MG tablet, Take 1 tablet by mouth Daily., Disp: 90 tablet, Rfl: 2    losartan (COZAAR) 50 MG tablet, Take 1 tablet by mouth Daily., Disp: 90 tablet, Rfl: 2    Lutein-Zeaxanthin 25-5 MG capsule, Take 1 tablet by mouth Daily., Disp: , Rfl:     Multiple Vitamins-Minerals (PRESERVISION AREDS 2  "PO), Take  by mouth Daily., Disp: , Rfl:     naproxen (EC NAPROSYN) 500 MG EC tablet, , Disp: , Rfl:     Omega-3 Fatty Acids (OMEGA-3 FISH OIL) 500 MG capsule, Take 1 capsule by mouth Daily., Disp: , Rfl:     omeprazole (priLOSEC) 40 MG capsule, Take 1 capsule by mouth Daily., Disp: 90 capsule, Rfl: 3    Multiple Vitamins-Minerals (PreserVision AREDS 2+Multi Vit) capsule, , Disp: , Rfl:         Physical Exam:     Vitals:    07/30/25 1105   Weight: 73.9 kg (163 lb)   Height: 162.6 cm (64\")   PainSc: 7    PainLoc: Back        General: Alert and oriented, No acute distress.   HEENT: Normocephalic, atraumatic.   Cardiovascular: No gross edema  Respiratory: Respirations are non-labored    Thoracic Spine:   Inspection: no gross abnormality  Paraspinal muscle palpation: nontender  Spinous process palpation: nontender    Lumbar Spine:   No masses or atrophy  Range of motion - Flexion normal. Extension reduced.    Facet Loading: Positive bilaterally  Facet Palpation -tender bilaterally  Pawel finger/Gaenslen's/Scottie's/STACIE/Thigh thrust -   Straight leg raise/slump test: Positive left  Multifidus toe-touch test:    Lower extremity strength 5 out of 5 bilaterally  She is tender to palpation in the lumbar spine.  There is some midline tenderness in the mid lumbar spine    Motor Exam:      Strength: Rate on 1-5 scale Right Left    L1/2- hip flexion 5/5  5/5    L3- knee extension 5/5  5/5    L4- ankle dorsiflexion 5/5  5/5    L5- great toe extension 5/5  5/5    S1- ankle plantarflexion 5/5  5/5    Sensory Exam: Altered sensation left L4 dermatome      Neurologic: Cranial Nerves II-XII are grossly intact.     Psychiatric: Cooperative.  Tearful  Gait: Antalgic flexed forward  Assistive Devices: Walker    Imaging Studies:   No results found for this or any previous visit.        Independent review of radiographic imaging: Available for my interpretation is lumbar MRI dated November 20, 2024 demonstrating left-sided L4/L5 " disc bulge causing neuroforaminal stenosis; there is facet arthropathy; there is multifidus and lumbar paraspinal muscle atrophy.    Impression & Plan:       12/09/2024: Ana Alaniz is a 70 y.o. female with past medical history significant for LUKASZ, HTN, GERD who presents to the pain clinic for evaluation and treatment of chronic low back pain the patient left lower extremity.  MRI interpretation as above.  Evaluation consistent with lumbar radiculopathy.  We discussed epidural steroid injection to improve pain.  If greater than 50% relief for at least 2-3 months can consider repeat as needed every 3 to 4 months.  I had a discussion with the patient regarding the risks of the procedure including bleeding, infection, damage to surrounding structures.  We discussed the potential adverse effects of corticosteroid injection including flushing of the face, lipodystrophy, skin discoloration, elevated blood glucose, increased blood pressure.  Risks of frequent steroid administration include weight gain, hormonal changes, mood changes, osteoporosis.  Additionally will start gabapentin.  1/15/2025: Discontinue gabapentin.  Good relief from TFESI.  Can consider repeat if needed.  4/29/2025: Good relief from TFESI ongoing.  Will restart gabapentin.  Patient politely declines NSA referral.  7/30/2025: Good relief of radicular symptoms from TFESI ongoing.  Will discontinue gabapentin as this has not been beneficial.  Acute exacerbation of symptoms.  Will obtain updated lumbar radiograph to rule out acute etiology.  Will start baclofen and send Medrol Dosepak.  Will plan for bilateral LMBB/RFA.  Evaluation consistent with lumbosacral facet spondylosis, acute myofascial/muscle strain    1. Chronic low back pain, unspecified back pain laterality, unspecified whether sciatica present    2. Lumbar radiculopathy    3. Chronic pain syndrome    4. Lumbosacral spondylosis without myelopathy    5. Acute low back pain without sciatica,  unspecified back pain laterality              PLAN:  1. Medication Recommendations: Recommend Voltaren topical, NSAIDs, Tylenol.  Can trial turmeric 500 mg twice daily if NSAID contraindicated.  Discontinue gabapentin as this has not been beneficial  - Start Medrol Dosepak  - Start baclofen 10 mg 1 to 2 tablets daily as needed    2. Physical Therapy: Continue PT/HEP    3. Psychological: defer    4. Complementary and alternative (CAM) Therapies:     5. Labs/Diagnostic studies: Compliant UDS as of 6/25/2025    6. Imaging: Obtain lumbar radiographs with flexion-extension to rule out acute etiology    7. Interventions: Schedule bilateral L3/4 and L4/5 lumbar medial branch blocks.  If the first block provides diagnostic relief we will schedule a second set of medial branch blocks.  If second set of medial branch blocks provides diagnostic relief we will schedule rhizotomy.   - Risks of this procedure include bleeding, infection, damage to surrounding structures which may exacerbate symptoms    Can consider repeat left L4/L5 transforaminal epidural steroid injection (71989).  - May consider interlaminar approach.     8. Referrals:    9. Records:     10. Lifestyle goals:    Follow-up 6 weeks      [facility]  Tammy Blanchard PA-C        Quality Metrics:

## 2025-08-04 ENCOUNTER — TELEPHONE (OUTPATIENT)
Dept: PAIN MEDICINE | Facility: CLINIC | Age: 70
End: 2025-08-04
Payer: MEDICARE

## 2025-08-14 ENCOUNTER — OUTSIDE FACILITY SERVICE (OUTPATIENT)
Dept: PAIN MEDICINE | Facility: CLINIC | Age: 70
End: 2025-08-14
Payer: MEDICARE

## 2025-08-14 ENCOUNTER — DOCUMENTATION (OUTPATIENT)
Dept: PAIN MEDICINE | Facility: CLINIC | Age: 70
End: 2025-08-14

## 2025-08-15 DIAGNOSIS — M54.50 ACUTE LOW BACK PAIN WITHOUT SCIATICA, UNSPECIFIED BACK PAIN LATERALITY: ICD-10-CM

## 2025-08-18 ENCOUNTER — TELEPHONE (OUTPATIENT)
Dept: PAIN MEDICINE | Facility: CLINIC | Age: 70
End: 2025-08-18
Payer: MEDICARE

## 2025-08-18 DIAGNOSIS — M47.817 LUMBOSACRAL SPONDYLOSIS WITHOUT MYELOPATHY: Primary | ICD-10-CM

## 2025-08-18 RX ORDER — BACLOFEN 10 MG/1
TABLET ORAL
Qty: 45 TABLET | Refills: 2 | Status: SHIPPED | OUTPATIENT
Start: 2025-08-18